# Patient Record
Sex: FEMALE | Race: WHITE | NOT HISPANIC OR LATINO | Employment: PART TIME | URBAN - METROPOLITAN AREA
[De-identification: names, ages, dates, MRNs, and addresses within clinical notes are randomized per-mention and may not be internally consistent; named-entity substitution may affect disease eponyms.]

---

## 2019-01-02 ASSESSMENT — MIFFLIN-ST. JEOR: SCORE: 1257

## 2019-01-03 ENCOUNTER — ANESTHESIA - HEALTHEAST (OUTPATIENT)
Dept: SURGERY | Facility: HOSPITAL | Age: 29
End: 2019-01-03

## 2019-01-04 ENCOUNTER — SURGERY - HEALTHEAST (OUTPATIENT)
Dept: SURGERY | Facility: HOSPITAL | Age: 29
End: 2019-01-04

## 2019-01-04 ASSESSMENT — MIFFLIN-ST. JEOR: SCORE: 1269.7

## 2020-04-20 ENCOUNTER — RECORDS - HEALTHEAST (OUTPATIENT)
Dept: ADMINISTRATIVE | Facility: OTHER | Age: 30
End: 2020-04-20

## 2020-04-22 ENCOUNTER — COMMUNICATION - HEALTHEAST (OUTPATIENT)
Dept: TELEHEALTH | Facility: CLINIC | Age: 30
End: 2020-04-22

## 2020-04-22 ENCOUNTER — HOSPITAL ENCOUNTER (OUTPATIENT)
Dept: CT IMAGING | Facility: CLINIC | Age: 30
Discharge: HOME | End: 2020-04-22
Attending: OBSTETRICS & GYNECOLOGY

## 2020-04-22 DIAGNOSIS — R10.2 PELVIC PAIN: ICD-10-CM

## 2021-05-17 ENCOUNTER — RECORDS - HEALTHEAST (OUTPATIENT)
Dept: ADMINISTRATIVE | Facility: OTHER | Age: 31
End: 2021-05-17

## 2021-05-17 ASSESSMENT — MIFFLIN-ST. JEOR: SCORE: 1218.44

## 2021-05-18 ENCOUNTER — ANESTHESIA - HEALTHEAST (OUTPATIENT)
Dept: SURGERY | Facility: CLINIC | Age: 31
End: 2021-05-18

## 2021-05-18 ENCOUNTER — SURGERY - HEALTHEAST (OUTPATIENT)
Dept: SURGERY | Facility: CLINIC | Age: 31
End: 2021-05-18
Payer: MEDICARE

## 2021-05-18 ENCOUNTER — COMMUNICATION - HEALTHEAST (OUTPATIENT)
Dept: SCHEDULING | Facility: CLINIC | Age: 31
End: 2021-05-18

## 2021-05-25 ENCOUNTER — AMBULATORY - HEALTHEAST (OUTPATIENT)
Dept: SURGERY | Facility: CLINIC | Age: 31
End: 2021-05-25

## 2021-05-25 ENCOUNTER — OFFICE VISIT - HEALTHEAST (OUTPATIENT)
Dept: GERIATRICS | Facility: CLINIC | Age: 31
End: 2021-05-25

## 2021-05-25 DIAGNOSIS — S82.852S TRIMALLEOLAR FRACTURE OF ANKLE, CLOSED, LEFT, SEQUELA: ICD-10-CM

## 2021-05-25 DIAGNOSIS — F32.A ANXIETY AND DEPRESSION: ICD-10-CM

## 2021-05-25 DIAGNOSIS — F41.9 ANXIETY AND DEPRESSION: ICD-10-CM

## 2021-05-25 DIAGNOSIS — F60.3 BORDERLINE PERSONALITY DISORDER (H): ICD-10-CM

## 2021-05-25 DIAGNOSIS — Z11.59 ENCOUNTER FOR SCREENING FOR OTHER VIRAL DISEASES: ICD-10-CM

## 2021-05-25 DIAGNOSIS — B37.2 CANDIDAL DERMATITIS: ICD-10-CM

## 2021-05-25 DIAGNOSIS — G80.9 CEREBRAL PALSY, UNSPECIFIED TYPE (H): ICD-10-CM

## 2021-05-25 ASSESSMENT — MIFFLIN-ST. JEOR: SCORE: 1259.27

## 2021-05-26 ENCOUNTER — RECORDS - HEALTHEAST (OUTPATIENT)
Dept: ADMINISTRATIVE | Facility: OTHER | Age: 31
End: 2021-05-26

## 2021-05-26 ENCOUNTER — RECORDS - HEALTHEAST (OUTPATIENT)
Dept: LAB | Facility: CLINIC | Age: 31
End: 2021-05-26

## 2021-05-26 ENCOUNTER — OFFICE VISIT - HEALTHEAST (OUTPATIENT)
Dept: GERIATRICS | Facility: CLINIC | Age: 31
End: 2021-05-26

## 2021-05-26 DIAGNOSIS — S82.852S TRIMALLEOLAR FRACTURE OF ANKLE, CLOSED, LEFT, SEQUELA: ICD-10-CM

## 2021-05-26 LAB — HCG UR QL: NEGATIVE

## 2021-05-26 ASSESSMENT — MIFFLIN-ST. JEOR
SCORE: 1211.64
SCORE: 1259.27

## 2021-05-27 ENCOUNTER — RECORDS - HEALTHEAST (OUTPATIENT)
Dept: LAB | Facility: CLINIC | Age: 31
End: 2021-05-27

## 2021-05-27 VITALS
BODY MASS INDEX: 23.78 KG/M2 | BODY MASS INDEX: 22.59 KG/M2 | HEIGHT: 62 IN | BODY MASS INDEX: 22.59 KG/M2 | WEIGHT: 123.5 LBS | HEIGHT: 62 IN | WEIGHT: 130 LBS

## 2021-05-27 LAB
ANION GAP SERPL CALCULATED.3IONS-SCNC: 11 MMOL/L (ref 5–18)
BUN SERPL-MCNC: 11 MG/DL (ref 8–22)
CALCIUM SERPL-MCNC: 9 MG/DL (ref 8.5–10.5)
CHLORIDE BLD-SCNC: 104 MMOL/L (ref 98–107)
CO2 SERPL-SCNC: 23 MMOL/L (ref 22–31)
CREAT SERPL-MCNC: 0.68 MG/DL (ref 0.6–1.1)
ERYTHROCYTE [DISTWIDTH] IN BLOOD BY AUTOMATED COUNT: 11.9 % (ref 11–14.5)
GFR SERPL CREATININE-BSD FRML MDRD: >60 ML/MIN/1.73M2
GLUCOSE BLD-MCNC: 113 MG/DL (ref 70–125)
HBA1C MFR BLD: 5.1 %
HCT VFR BLD AUTO: 38.2 % (ref 35–47)
HGB BLD-MCNC: 13 G/DL (ref 12–16)
MCH RBC QN AUTO: 30.2 PG (ref 27–34)
MCHC RBC AUTO-ENTMCNC: 34 G/DL (ref 32–36)
MCV RBC AUTO: 89 FL (ref 80–100)
PLATELET # BLD AUTO: 394 THOU/UL (ref 140–440)
PMV BLD AUTO: 10.3 FL (ref 8.5–12.5)
POTASSIUM BLD-SCNC: 3.4 MMOL/L (ref 3.5–5)
RBC # BLD AUTO: 4.31 MILL/UL (ref 3.8–5.4)
SARS-COV-2 PCR COMMENT: NORMAL
SARS-COV-2 RNA SPEC QL NAA+PROBE: NEGATIVE
SARS-COV-2 VIRUS SPECIMEN SOURCE: NORMAL
SODIUM SERPL-SCNC: 138 MMOL/L (ref 136–145)
WBC: 6.7 THOU/UL (ref 4–11)

## 2021-05-28 ENCOUNTER — RECORDS - HEALTHEAST (OUTPATIENT)
Dept: ADMINISTRATIVE | Facility: OTHER | Age: 31
End: 2021-05-28

## 2021-05-28 ENCOUNTER — ANESTHESIA - HEALTHEAST (OUTPATIENT)
Dept: SURGERY | Facility: CLINIC | Age: 31
End: 2021-05-28

## 2021-05-28 ENCOUNTER — SURGERY - HEALTHEAST (OUTPATIENT)
Dept: SURGERY | Facility: CLINIC | Age: 31
End: 2021-05-28
Payer: MEDICARE

## 2021-05-28 ASSESSMENT — MIFFLIN-ST. JEOR: SCORE: 1238.85

## 2021-06-01 ENCOUNTER — OFFICE VISIT - HEALTHEAST (OUTPATIENT)
Dept: GERIATRICS | Facility: CLINIC | Age: 31
End: 2021-06-01

## 2021-06-01 DIAGNOSIS — Z98.890 S/P ORIF (OPEN REDUCTION INTERNAL FIXATION) FRACTURE: ICD-10-CM

## 2021-06-01 DIAGNOSIS — F60.3 BORDERLINE PERSONALITY DISORDER (H): ICD-10-CM

## 2021-06-01 DIAGNOSIS — Z87.81 S/P ORIF (OPEN REDUCTION INTERNAL FIXATION) FRACTURE: ICD-10-CM

## 2021-06-01 DIAGNOSIS — F32.A DEPRESSION, UNSPECIFIED DEPRESSION TYPE: ICD-10-CM

## 2021-06-01 DIAGNOSIS — G80.9 CEREBRAL PALSY, UNSPECIFIED TYPE (H): ICD-10-CM

## 2021-06-01 DIAGNOSIS — S82.852S TRIMALLEOLAR FRACTURE OF ANKLE, CLOSED, LEFT, SEQUELA: ICD-10-CM

## 2021-06-01 ASSESSMENT — MIFFLIN-ST. JEOR: SCORE: 1275.6

## 2021-06-02 VITALS — WEIGHT: 134.8 LBS | HEIGHT: 62 IN | BODY MASS INDEX: 24.8 KG/M2

## 2021-06-08 ENCOUNTER — OFFICE VISIT - HEALTHEAST (OUTPATIENT)
Dept: GERIATRICS | Facility: CLINIC | Age: 31
End: 2021-06-08

## 2021-06-08 DIAGNOSIS — S82.852S TRIMALLEOLAR FRACTURE OF ANKLE, CLOSED, LEFT, SEQUELA: ICD-10-CM

## 2021-06-08 ASSESSMENT — MIFFLIN-ST. JEOR: SCORE: 1233.87

## 2021-06-11 ENCOUNTER — OFFICE VISIT - HEALTHEAST (OUTPATIENT)
Dept: GERIATRICS | Facility: CLINIC | Age: 31
End: 2021-06-11

## 2021-06-11 DIAGNOSIS — S82.852S TRIMALLEOLAR FRACTURE OF ANKLE, CLOSED, LEFT, SEQUELA: ICD-10-CM

## 2021-06-11 DIAGNOSIS — F32.A ANXIETY AND DEPRESSION: ICD-10-CM

## 2021-06-11 DIAGNOSIS — Z87.81 S/P ORIF (OPEN REDUCTION INTERNAL FIXATION) FRACTURE: ICD-10-CM

## 2021-06-11 DIAGNOSIS — F41.9 ANXIETY AND DEPRESSION: ICD-10-CM

## 2021-06-11 DIAGNOSIS — Z98.890 S/P ORIF (OPEN REDUCTION INTERNAL FIXATION) FRACTURE: ICD-10-CM

## 2021-06-11 RX ORDER — HYDROMORPHONE HYDROCHLORIDE 2 MG/1
2-4 TABLET ORAL EVERY 4 HOURS PRN
Status: SHIPPED | COMMUNITY
Start: 2021-06-11 | End: 2024-05-15

## 2021-06-11 RX ORDER — KETOROLAC TROMETHAMINE 10 MG/1
10 TABLET, FILM COATED ORAL EVERY 6 HOURS PRN
Status: SHIPPED | COMMUNITY
Start: 2021-06-11 | End: 2024-05-15

## 2021-06-11 ASSESSMENT — MIFFLIN-ST. JEOR: SCORE: 1233.87

## 2021-06-15 PROBLEM — R10.2 PELVIC PAIN: Status: ACTIVE | Noted: 2017-01-21

## 2021-06-16 PROBLEM — S82.852S TRIMALLEOLAR FRACTURE OF ANKLE, CLOSED, LEFT, SEQUELA: Status: ACTIVE | Noted: 2021-05-17

## 2021-06-16 PROBLEM — G80.9 CEREBRAL PALSY (H): Status: ACTIVE | Noted: 2017-08-27

## 2021-06-16 PROBLEM — F60.3 BORDERLINE PERSONALITY DISORDER (H): Status: ACTIVE | Noted: 2017-08-27

## 2021-06-16 PROBLEM — F33.1 MODERATE EPISODE OF RECURRENT MAJOR DEPRESSIVE DISORDER (H): Status: ACTIVE | Noted: 2017-07-06

## 2021-06-16 PROBLEM — F32.A DEPRESSION: Status: ACTIVE | Noted: 2017-08-27

## 2021-06-17 NOTE — ANESTHESIA PROCEDURE NOTES
Peripheral Block    Patient location during procedure: OR  Start time: 5/18/2021 1:30 PM  End time: 5/18/2021 1:33 PM  post-op analgesia per surgeon order as noted in medical record  Staffing:  Performing  Anesthesiologist: Jose Klein IV, MD  Preanesthetic Checklist  Completed: patient identified, site marked, risks, benefits, and alternatives discussed, timeout performed, consent obtained, at patient's request, airway assessed, oxygen available, suction available, emergency drugs available and hand hygiene performed  Peripheral Block  Block type: saphenous, adductor canal block  Prep: ChloraPrep  Patient position: supine  Patient monitoring: cardiac monitor, continuous pulse oximetry, heart rate and blood pressure  Laterality: left  Injection technique: ultrasound guided    Ultrasound used to visualize needle placement in proximity to nerve being blocked: yes   US used to visualize anesthetic spread  Visualized anatomic structures normal  No Pathological Findings  Permanent ultrasound image captured for medical record  Sterile gel and probe cover used for ultrasound.  Needle  Needle type: echogenic   Needle gauge: 20G  Needle length: 6 in  no peripheral nerve catheter placed  Assessment  Injection assessment: no difficulty with injection, negative aspiration for heme, incremental injection and no paresthesia on injection

## 2021-06-17 NOTE — ANESTHESIA PREPROCEDURE EVALUATION
Anesthesia Evaluation      Patient summary reviewed   No history of anesthetic complications     Airway   Mallampati: I  Neck ROM: full   Pulmonary - negative ROS and normal exam   (+) a smoker (current some day)                         Cardiovascular - negative ROS and normal exam  Exercise tolerance: > or = 4 METS  (-) hypertension, past MI, CAD, murmur  Rhythm: regular  Rate: normal,    no murmur      Neuro/Psych    (+) depression, anxiety/panic attacks,     Comments: Cerebral palsy  Borderline developmental delay  ADD  Borderline personality disorder      Endo/Other - negative ROS      GI/Hepatic/Renal - negative ROS      Other findings: Labs 12/28/18:  WBC 8.8, Hgb 14.6, Plt 270  Na 140, K 3.7, BUN 16, Cr 0.81      Dental - normal exam                          Anesthesia Plan  Planned anesthetic: general endotracheal    Hx of PONV and plan for scopolamine patch, dexamethasone, zofran and low-dose propofol gtt (25-50 mcg/kg/min).    ASA 2   Induction: intravenous   Anesthetic plan and risks discussed with: patient and parent/guardian  Anesthesia plan special considerations: antiemetics,   Post-op plan: routine recovery

## 2021-06-17 NOTE — ANESTHESIA CARE TRANSFER NOTE
Last vitals:   Vitals:    05/18/21 1425   BP: 114/73   Pulse: 69   Resp: 15   Temp: 36.2  C (97.2  F)   SpO2: 100%     Patient's level of consciousness is drowsy  Spontaneous respirations: yes  Maintains airway independently: yes  Dentition unchanged: yes  Oropharynx: oropharynx clear of all foreign objects    QCDR Measures:  ASA# 20 - Surgical Safety Checklist: WHO surgical safety checklist completed prior to induction    PQRS# 430 - Adult PONV Prevention: 4558F - Pt received => 2 anti-emetic agents (different classes) preop & intraop  ASA# 8 - Peds PONV Prevention: NA - Not pediatric patient, not GA or 2 or more risk factors NOT present  PQRS# 424 - Deborah-op Temp Management: 4559F - At least one body temp DOCUMENTED => 35.5C or 95.9F within required timeframe  PQRS# 426 - PACU Transfer Protocol: - Transfer of care checklist used  ASA# 14 - Acute Post-op Pain: ASA14B - Patient did NOT experience pain >= 7 out of 10

## 2021-06-17 NOTE — ANESTHESIA PROCEDURE NOTES
Peripheral Block    Patient location during procedure: OR  Start time: 5/18/2021 1:25 PM  End time: 5/18/2021 1:30 PM  post-op analgesia per surgeon order as noted in medical record  Staffing:  Performing  Anesthesiologist: Jose Klein IV, MD  Preanesthetic Checklist  Completed: patient identified, site marked, risks, benefits, and alternatives discussed, timeout performed, consent obtained, at patient's request, airway assessed, oxygen available, suction available, emergency drugs available and hand hygiene performed  Peripheral Block  Block type: sciatic, popliteal  Prep: ChloraPrep  Patient position: supine  Patient monitoring: cardiac monitor, continuous pulse oximetry, heart rate and blood pressure  Laterality: left  Injection technique: ultrasound guided    Ultrasound used to visualize needle placement in proximity to nerve being blocked: yes   US used to visualize anesthetic spread  Visualized anatomic structures normal  No Pathological Findings  Permanent ultrasound image captured for medical record    Needle  Needle gauge: 20G  Needle length: 6 in  no peripheral nerve catheter placed  Assessment  Injection assessment: no difficulty with injection, negative aspiration for heme, no paresthesia on injection and incremental injection

## 2021-06-17 NOTE — ANESTHESIA POSTPROCEDURE EVALUATION
Patient: Shannon Christopher  Procedure(s):  CLOSED REDUCTION EXTERNAL FIXATION, LEFT ANKLE FRACTURE (Left)  Anesthesia type: general    Patient location: PACU  Last vitals:   Vitals Value Taken Time   /71 05/18/21 1630   Temp 36.7  C (98  F) 05/18/21 1630   Pulse 79 05/18/21 1630   Resp 16 05/18/21 1630   SpO2 97 % 05/18/21 1630     Post vital signs: stable  Level of consciousness: awake and responds to simple questions  Post-anesthesia pain: pain controlled  Post-anesthesia nausea and vomiting: no  Pulmonary: unassisted, return to baseline  Cardiovascular: stable and blood pressure at baseline  Hydration: adequate  Anesthetic events: no    QCDR Measures:  ASA# 11 - Deborah-op Cardiac Arrest: ASA11B - Patient did NOT experience unanticipated cardiac arrest  ASA# 12 - Deborah-op Mortality Rate: ASA12B - Patient did NOT die  ASA# 13 - PACU Re-Intubation Rate: ASA13B - Patient did NOT require a new airway mgmt  ASA# 10 - Composite Anes Safety: ASA10A - No serious adverse event    Additional Notes:

## 2021-06-22 NOTE — ANESTHESIA PREPROCEDURE EVALUATION
Anesthesia Evaluation      Patient summary reviewed   History of anesthetic complications (PONV)     Airway   Mallampati: I  Neck ROM: full   Pulmonary - normal exam   (+) a smoker (current some day)                         Cardiovascular - normal exam  Exercise tolerance: > or = 4 METS  (-) hypertension, past MI, CAD, murmur  Rhythm: regular  Rate: normal,    no murmur      Neuro/Psych    (+) depression, anxiety/panic attacks,     Comments: Cerebral palsy  Borderline developmental delay  ADD  Borderline personality disorder      Endo/Other - negative ROS      GI/Hepatic/Renal - negative ROS      Other findings: Labs 12/28/18:  WBC 8.8, Hgb 14.6, Plt 270  Na 140, K 3.7, BUN 16, Cr 0.81      Dental - normal exam                        Anesthesia Plan  Planned anesthetic: general endotracheal  GETA.  Hx of PONV and plan for scopolamine patch, dexamethasone, zofran and low-dose propofol gtt (25-50 mcg/kg/min).    ASA 2   Induction: intravenous   Anesthetic plan and risks discussed with: patient and parent/guardian  Anesthesia plan special considerations: antiemetics,   Post-op plan: routine recovery

## 2021-06-22 NOTE — ANESTHESIA POSTPROCEDURE EVALUATION
Patient: Shannon Christopher  ROBOTIC TOTAL LAPAROSCOPIC HYSTERECTOMY, BILATERAL SALPINGECTOMY LYSIS OF ADHESIONS  Anesthesia type: general    Patient location: PACU  Last vitals:   Vitals:    01/04/19 0950   BP: 112/69   Pulse: 68   Resp: 28   Temp:    SpO2: 98%     Post vital signs: stable  Level of consciousness: awake and responds to simple questions  Post-anesthesia pain: pain controlled  Post-anesthesia nausea and vomiting: no  Pulmonary: unassisted, return to baseline  Cardiovascular: stable and blood pressure at baseline  Hydration: adequate  Anesthetic events: no    QCDR Measures:  ASA# 11 - Deborah-op Cardiac Arrest: ASA11B - Patient did NOT experience unanticipated cardiac arrest  ASA# 12 - Deborah-op Mortality Rate: ASA12B - Patient did NOT die  ASA# 13 - PACU Re-Intubation Rate: ASA13B - Patient did NOT require a new airway mgmt  ASA# 10 - Composite Anes Safety: ASA10A - No serious adverse event    Additional Notes:

## 2021-06-22 NOTE — ANESTHESIA CARE TRANSFER NOTE
Last vitals:   Vitals:    01/04/19 0900   BP: (!) 125/97   Pulse: 77   Resp: 14   Temp: 37.2  C (99  F)   SpO2: 99%     Patient's level of consciousness is drowsy  Spontaneous respirations: yes  Maintains airway independently: yes  Dentition unchanged: yes  Oropharynx: oropharynx clear of all foreign objects    QCDR Measures:  ASA# 20 - Surgical Safety Checklist: WHO surgical safety checklist completed prior to induction    PQRS# 430 - Adult PONV Prevention: 4558F - Pt received => 2 anti-emetic agents (different classes) preop & intraop  ASA# 8 - Peds PONV Prevention: NA - Not pediatric patient, not GA or 2 or more risk factors NOT present  PQRS# 424 - Deborah-op Temp Management: 4559F - At least one body temp DOCUMENTED => 35.5C or 95.9F within required timeframe  PQRS# 426 - PACU Transfer Protocol: - Transfer of care checklist used  ASA# 14 - Acute Post-op Pain: ASA14B - Patient did NOT experience pain >= 7 out of 10

## 2021-06-25 NOTE — PROGRESS NOTES
Code Status:  FULL CODE  Visit Type: Hospital Visit Follow Up (Trimalleolar fracture, status post external fixation, pain management, rash)     Facility:  [facility] [[id number]]      Facility Type: SNF (Skilled Nursing Facility, TCU)    History of Present Illness:   Hospital Admission Date: 5/17/2021 Hospital Discharge Date: 5/20/2021      Shannon Christopher is a 31 y.o. female with a past medical history for cerebral palsy, intellectual impairment, borderline personality, depression with anxiety.  Recently hospitalized after a mechanical fall in which she sustained a trimalleolar fracture.  She underwent external fixation and open reduction with internal fixation and splinting.  She was to remain nonweightbearing of her left leg and leave her splint in place.  She has a follow-up with orthopedics on 5/25/2020.    Today, she reports that she is having extreme pain and feels that 5 mg of oxycodone does not always manage her pain.  Chart review reveals she was taking oxycodone 5-10 mg every 4 hours as needed in the hospital.  She has good CMS to her toes and is able to move those easily with minimal edema.  Her APAP is only as needed at this time.  She does complain of an itchy raised red rash of her groin area.  Does not have any discharge.    Past Medical History:   Diagnosis Date     ADD (attention deficit disorder)      Anxiety      Borderline mental retardation      Borderline personality disorder (H)      Cerebral palsy (H)      Depression      PONV (postoperative nausea and vomiting)      Uterine fibroid      Past Surgical History:   Procedure Laterality Date     ADENOIDECTOMY       BUNIONECTOMY       EXTERNAL FIXATOR APPLICATION Left 5/18/2021    Procedure: CLOSED REDUCTION EXTERNAL FIXATION, LEFT ANKLE FRACTURE;  Surgeon: Dereck Nielson DO;  Location: [facility];  Service: Orthopedics     EYE MUSCLE SURGERY       FOOT CAPSULE RELEASE W/ PERCUTANEOUS HEEL CORD LENGTHENING,  TIBIAL TENDON TRANSFER Right      LAPAROSCOPIC HYSTERECTOMY N/A 1/4/2019    Procedure: ROBOTIC TOTAL LAPAROSCOPIC HYSTERECTOMY, BILATERAL SALPINGECTOMY LYSIS OF ADHESIONS;  Surgeon: Jose Golden MD;  Location: [facility];  Service: Gynecology     TYMPANOPLASTY       No family history on file.  Social History     Socioeconomic History     Marital status: Single     Spouse name: Not on file     Number of children: Not on file     Years of education: Not on file     Highest education level: Not on file   Occupational History     Not on file   Social Needs     Financial resource strain: Not on file     Food insecurity     Worry: Not on file     Inability: Not on file     Transportation needs     Medical: Not on file     Non-medical: Not on file   Tobacco Use     Smoking status: Current Some Day Smoker     Smokeless tobacco: Never Used     Tobacco comment: < 1 PPW   Substance and Sexual Activity     Alcohol use: Yes     Comment: rarely     Drug use: No     Sexual activity: Not on file   Lifestyle     Physical activity     Days per week: Not on file     Minutes per session: Not on file     Stress: Not on file   Relationships     Social connections     Talks on phone: Not on file     Gets together: Not on file     Attends Advent service: Not on file     Active member of club or organization: Not on file     Attends meetings of clubs or organizations: Not on file     Relationship status: Not on file     Intimate partner violence     Fear of current or ex partner: Not on file     Emotionally abused: Not on file     Physically abused: Not on file     Forced sexual activity: Not on file   Other Topics Concern     Not on file   Social History Narrative    Patient arrived alone to the ED 08/13/17       Current Outpatient Medications   Medication Sig Dispense Refill     acetaminophen (TYLENOL) 325 MG tablet Take 2 tablets (650 mg total) by mouth every 4 (four) hours as needed for pain (mild pain). 100 tablet 0      aspirin 325 MG tablet Take 1 tablet (325 mg total) by mouth daily. 30 tablet 0     divalproex (DEPAKOTE ER) 500 MG 24 hour tablet Take 500 mg by mouth at bedtime.        estradioL (ESTRACE) 2 MG tablet Take 2 mg by mouth at bedtime.        hydrOXYzine HCL (ATARAX) 25 MG tablet Take 1 tablet (25 mg total) by mouth every 6 (six) hours as needed for itching (with pain, moderate pain). 30 tablet 0     LATUDA 60 mg Tab tablet Take 60 mg by mouth at bedtime.        OLANZapine (ZYPREXA) 7.5 MG tablet Take 7.5 mg by mouth at bedtime.        oxyCODONE (ROXICODONE) 5 MG immediate release tablet Take 1 tablet (5 mg total) by mouth every 4 (four) hours as needed for pain (moderate to severe pain). (Patient taking differently: Take 5-7.5 mg by mouth every 4 (four) hours as needed for pain (moderate to severe pain). ) 30 tablet 0     polyethylene glycol (MIRALAX) 17 gram packet Take 1 packet (17 g total) by mouth daily. 7 packet 0     senna-docusate (PERICOLACE) 8.6-50 mg tablet Take 1-2 tablets by mouth 2 (two) times a day. Take while on oral narcotics to prevent or treat constipation. 30 tablet 0     zonisamide (ZONEGRAN) 100 MG capsule Take 400 mg by mouth at bedtime.        No current facility-administered medications for this visit.      No Known Allergies    There is no immunization history on file for this patient.    Post Discharge Medication Reconciliation Status: discharge medications reconciled and changed, per note/orders    Review of Systems   Patient denies fever, chills, headache, lightheadedness, dizziness, rhinorrhea, cough, congestion, shortness of breath, chest pain, palpitations, abdominal pain, n/v, diarrhea, constipation, change in appetite, dysuria, frequency, burning or pain with urination.  Other than stated in HPI all other review of systems is negative.         Physical Exam   Vitals:    05/25/21 1039   BP: 127/88   Pulse: 86   Resp: 20   Temp: 99.1  F (37.3  C)   SpO2: 99%       GENERAL APPEARANCE:  Well developed, well nourished, in no acute distress.  HEENT: normocephalic, atraumatic  PERRL, sclerae anicteric, conjunctivae clear and moist, EOM intact  LUNGS: Lung sounds CTA, no adventitious sounds, respiratory effort normal.  CARD: RRR, S1, S2, without murmurs, gallops, rubs,  ABD: Soft, none distended and nontender with normal bowel sounds.   MSK: Muscle strength and tone were equal bilaterally  EXTREMITIES: Left foot with good mobility of toes, good CMS and minimal edema.  Left foot in splint with external fixation device and toes only visible  NEURO: Alert and oriented x 3.  Intellectual impairment  Face is symmetric.  SKIN: Red raised candidal rash of bilateral groins  PSYCH: euthymic        Labs:  Recent Results (from the past 240 hour(s))   Drugs of Abuse 1,Urine   Result Value Ref Range    Amphetamines (!) Screen Negative     Screen Positive (Confirmation available on request)    Benzodiazepines Screen Negative Screen Negative    Opiates Screen Negative Screen Negative    Phencyclidine Screen Negative Screen Negative    THC Screen Negative Screen Negative    Barbiturates Screen Negative Screen Negative    Cocaine Metabolite Screen Negative Screen Negative    Oxycodone (!) Screen Negative     Screen Positive (Confirmation available on request)    Creatinine, Urine 120.2 mg/dL   HM2(CBC w/o Differential)   Result Value Ref Range    WBC 7.6 4.0 - 11.0 thou/uL    RBC 4.40 3.80 - 5.40 mill/uL    Hemoglobin 13.2 12.0 - 16.0 g/dL    Hematocrit 38.9 35.0 - 47.0 %    MCV 88 80 - 100 fL    MCH 30.0 27.0 - 34.0 pg    MCHC 33.9 32.0 - 36.0 g/dL    RDW 12.0 11.0 - 14.5 %    Platelets 260 140 - 440 thou/uL    MPV 10.6 8.5 - 12.5 fL   Comprehensive Metabolic Panel   Result Value Ref Range    Sodium 141 136 - 145 mmol/L    Potassium 3.3 (L) 3.5 - 5.0 mmol/L    Chloride 106 98 - 107 mmol/L    CO2 21 (L) 22 - 31 mmol/L    Anion Gap, Calculation 14 5 - 18 mmol/L    Glucose 90 70 - 125 mg/dL    BUN 17 8 - 22 mg/dL     Creatinine 0.77 0.60 - 1.10 mg/dL    GFR MDRD Af Amer >60 >60 mL/min/1.73m2    GFR MDRD Non Af Amer >60 >60 mL/min/1.73m2    Bilirubin, Total 0.7 0.0 - 1.0 mg/dL    Calcium 8.9 8.5 - 10.5 mg/dL    Protein, Total 6.9 6.0 - 8.0 g/dL    Albumin 3.9 3.5 - 5.0 g/dL    Alkaline Phosphatase 58 45 - 120 U/L     (H) 0 - 40 U/L    ALT 68 (H) 0 - 45 U/L   Beta-hCG, Qualitative, Serum   Result Value Ref Range    Beta hCG Qualitative Negative Negative   Asymptomatic SARS-CoV-2 (COVID-19)-PCR    Specimen: Respiratory   Result Value Ref Range    SARS-CoV-2 PCR Result Negative Negative, Invalid   Potassium   Result Value Ref Range    Potassium 3.7 3.5 - 5.0 mmol/L   Potassium - Next AM   Result Value Ref Range    Potassium 4.1 3.5 - 5.0 mmol/L   Hemoglobin POD1 and POD2   Result Value Ref Range    Hemoglobin 11.1 (L) 12.0 - 16.0 g/dL   Hemoglobin POD1 and POD2   Result Value Ref Range    Hemoglobin 10.5 (L) 12.0 - 16.0 g/dL   Potassium - Next AM   Result Value Ref Range    Potassium 3.7 3.5 - 5.0 mmol/L         Assessment:  1. Trimalleolar fracture of ankle, closed, left, sequela     2. Candidal dermatitis     3. Anxiety and depression     4. Cerebral palsy, unspecified type (H)     5. Borderline personality disorder (H)         Plan:   Tri malleolar fracture status post external fixation.  Follow-up surgery planned for 5/28.  She will be seeing orthopedics today.  Pain management is poor at this time so we will increase her oxycodone to 5-7.5 mg every 4 hours and could consider increasing that if needed.  Will schedule Tylenol 1000 mg 3 times daily and counseled patient on this change in the rationale to decrease her pain threshold.  Does have Atarax to potentiate her oxycodone however nursing does not always have good understanding of this plan will monitor and consider scheduling if needed.    Bowels managed with daily MiraLAX and senna S.    Candidal dermatitis: Nystatin cream 3 times daily until rash resolved      depression with anxiety: Appearing stable at this time, on Depakote, Latuda, Zyprexa and zonisamide.    cerebral palsy: Compensated, continue to monitor    Borderline personality disorder: Also has developmental disabilities that can play into anxiety.  Will need to monitor and communicate closely with the patient.    35 total minutes spent with 20 minutes spent in counseling and reassuring patient on pain management, medication regimen and plan for dermatitis.     Electronically signed by: Shasha Ocampo CNP

## 2021-06-25 NOTE — PROGRESS NOTES
Code Status:  FULL CODE  Visit Type: Follow-up (Trimalleolar fracture, s/p ORIF)     Facility:  [facility] [[id number]]      Facility Type: SNF (Skilled Nursing Facility, TCU)    History of Present Illness:   Hospital Admission Date: 5/17/2021 Hospital Discharge Date: 5/20/2021      Shannon Christopher is a 31 y.o. female with a past medical history for cerebral palsy, intellectual impairment, borderline personality, depression with anxiety.  Recently hospitalized after a mechanical fall in which she sustained a trimalleolar fracture.  She underwent external fixation and open reduction with internal fixation and splinting.  She was to remain nonweightbearing of her left leg and leave her splint in place.  She has a follow-up with orthopedics on 5/25/2020.    Today, I see the patient in follow up after having a ORIF of trimalleolar fracture on 5/28.  She denies any pain and reports trying to minimize the amount of pain meds she is taking.  She was switched to Ketoralac and dilaudid during her surgery.  She continues on APAP scheduled.  She is to work on walking with a walker with [facility] on LLE today.  She is requesting to be discharged soon.      Past Medical History:   Diagnosis Date     ADD (attention deficit disorder)      Anxiety      Borderline mental retardation      Borderline personality disorder (H)      Cerebral palsy (H)      Depression      PONV (postoperative nausea and vomiting)      Uterine fibroid      Past Surgical History:   Procedure Laterality Date     ADENOIDECTOMY       BUNIONECTOMY       EXTERNAL FIXATOR APPLICATION Left 5/18/2021    Procedure: CLOSED REDUCTION EXTERNAL FIXATION, LEFT ANKLE FRACTURE;  Surgeon: Dereck Nielson DO;  Location: [facility];  Service: Orthopedics     EYE MUSCLE SURGERY       FOOT CAPSULE RELEASE W/ PERCUTANEOUS HEEL CORD LENGTHENING, TIBIAL TENDON TRANSFER Right      LAPAROSCOPIC HYSTERECTOMY N/A 1/4/2019    Procedure: ROBOTIC TOTAL  LAPAROSCOPIC HYSTERECTOMY, BILATERAL SALPINGECTOMY LYSIS OF ADHESIONS;  Surgeon: Jose Golden MD;  Location: [facility];  Service: Gynecology     TYMPANOPLASTY       No family history on file.  Social History     Socioeconomic History     Marital status: Single     Spouse name: Not on file     Number of children: Not on file     Years of education: Not on file     Highest education level: Not on file   Occupational History     Not on file   Social Needs     Financial resource strain: Not on file     Food insecurity     Worry: Not on file     Inability: Not on file     Transportation needs     Medical: Not on file     Non-medical: Not on file   Tobacco Use     Smoking status: Current Some Day Smoker     Smokeless tobacco: Never Used     Tobacco comment: < 1 PPW   Substance and Sexual Activity     Alcohol use: Yes     Comment: rarely     Drug use: No     Sexual activity: Not on file   Lifestyle     Physical activity     Days per week: Not on file     Minutes per session: Not on file     Stress: Not on file   Relationships     Social connections     Talks on phone: Not on file     Gets together: Not on file     Attends Christian service: Not on file     Active member of club or organization: Not on file     Attends meetings of clubs or organizations: Not on file     Relationship status: Not on file     Intimate partner violence     Fear of current or ex partner: Not on file     Emotionally abused: Not on file     Physically abused: Not on file     Forced sexual activity: Not on file   Other Topics Concern     Not on file   Social History Narrative    Patient arrived alone to the ED 08/13/17       Current Outpatient Medications   Medication Sig Dispense Refill     acetaminophen (TYLENOL) 500 MG tablet Take 1,000 mg by mouth 3 (three) times a day.       aspirin 81 MG EC tablet Take 1 tablet (81 mg total) by mouth 2 (two) times a day.  0     divalproex (DEPAKOTE ER) 500 MG 24 hour tablet Take 500 mg by mouth  at bedtime.        estradioL (ESTRACE) 2 MG tablet Take 2 mg by mouth at bedtime.        HYDROmorphone (DILAUDID) 2 MG tablet Take 1-2 tablets (2-4 mg total) by mouth every 4 (four) hours as needed for pain. 30 tablet 0     hydrOXYzine HCL (ATARAX) 25 MG tablet Take 1 tablet (25 mg total) by mouth every 6 (six) hours as needed for itching (with pain, moderate pain). 30 tablet 0     ketorolac (TORADOL) 10 mg tablet Take 1 tablet (10 mg total) by mouth every 6 (six) hours as needed for pain. 20 tablet 0     LATUDA 60 mg Tab tablet Take 60 mg by mouth at bedtime.        nystatin (MYCOSTATIN) cream Apply topically 3 (three) times a day as needed for dry skin.       OLANZapine (ZYPREXA) 7.5 MG tablet Take 7.5 mg by mouth at bedtime.        polyethylene glycol (MIRALAX) 17 gram packet Take 1 packet (17 g total) by mouth daily. 7 packet 0     senna-docusate (PERICOLACE) 8.6-50 mg tablet Take 1-2 tablets by mouth 2 (two) times a day. Take while on oral narcotics to prevent or treat constipation. 30 tablet 0     zonisamide (ZONEGRAN) 100 MG capsule Take 400 mg by mouth at bedtime.        No current facility-administered medications for this visit.      No Known Allergies  Immunization History   Administered Date(s) Administered     COVID-19,PF,Moderna 03/03/2021, 03/31/2021       Post Discharge Medication Reconciliation Status: discharge medications reconciled and changed, per note/orders    Review of Systems   Patient denies fever, chills, headache, lightheadedness, dizziness, rhinorrhea, cough, congestion, shortness of breath, chest pain, palpitations, abdominal pain, n/v, diarrhea, constipation, change in appetite, dysuria, frequency, burning or pain with urination.  Other than stated in HPI all other review of systems is negative.         Physical Exam   Vitals:    06/01/21 0926   BP: 132/84   Pulse: 86   Resp: 20   Temp: 99.4  F (37.4  C)   SpO2: 100%       GENERAL APPEARANCE: Well developed, well nourished, in no acute  distress.  HEENT: normocephalic, atraumatic  PERRL, sclerae anicteric, conjunctivae clear and moist, EOM intact  LUNGS: Lung sounds CTA, no adventitious sounds, respiratory effort normal.  CARD: RRR, S1, S2, without murmurs, gallops, rubs,  ABD: Soft, none distended and nontender with normal bowel sounds.   MSK: Muscle strength and tone were equal bilaterally  EXTREMITIES: Left foot with good mobility of toes, good CMS and minimal edema. LLE in hard cast. No calf tenderness  NEURO: Alert and oriented x 3.  Intellectual impairment  Face is symmetric.  SKIN: no new rashes, petechiae   PSYCH: euthymic        Labs:  Recent Results (from the past 240 hour(s))   COVID-19 Virus PCR MRF    Specimen: Respiratory   Result Value Ref Range    COVID-19 VIRUS SPECIMEN SOURCE Nasopharyngeal     2019-nCOV       Test received-See reflex to IDDL test SARS CoV2 (COVID-19) Virus RT-PCR   SARS-CoV-2 (COVID-19)-PCR    Specimen: Respiratory   Result Value Ref Range    SARS-CoV-2 Virus Specimen Source Nasopharyngeal     SARS-CoV-2 PCR Result NEGATIVE     SARS-COV-2 PCR COMMENT       Testing was performed using the Aptima SARS-CoV-2 Assay on the [place]   Pregnancy, Urine   Result Value Ref Range    Pregnancy Test, Urine Negative Negative   Basic Metabolic Panel   Result Value Ref Range    Sodium 138 136 - 145 mmol/L    Potassium 3.4 (L) 3.5 - 5.0 mmol/L    Chloride 104 98 - 107 mmol/L    CO2 23 22 - 31 mmol/L    Anion Gap, Calculation 11 5 - 18 mmol/L    Glucose 113 70 - 125 mg/dL    Calcium 9.0 8.5 - 10.5 mg/dL    BUN 11 8 - 22 mg/dL    Creatinine 0.68 0.60 - 1.10 mg/dL    GFR MDRD Af Amer >60 >60 mL/min/1.73m2    GFR MDRD Non Af Amer >60 >60 mL/min/1.73m2   Glycosylated Hemoglobin A1C   Result Value Ref Range    Hemoglobin A1c 5.1 <=5.6 %   HM2(CBC w/o Differential)   Result Value Ref Range    WBC 6.7 4.0 - 11.0 thou/uL    RBC 4.31 3.80 - 5.40 mill/uL    Hemoglobin 13.0 12.0 - 16.0 g/dL    Hematocrit 38.2 35.0 - 47.0 %    MCV 89 80 - 100  fL    MCH 30.2 27.0 - 34.0 pg    MCHC 34.0 32.0 - 36.0 g/dL    RDW 11.9 11.0 - 14.5 %    Platelets 394 140 - 440 thou/uL    MPV 10.3 8.5 - 12.5 fL   Hemoglobin POD1 and POD2   Result Value Ref Range    Hemoglobin 11.6 (L) 12.0 - 16.0 g/dL   Urinalysis-UC if Indicated   Result Value Ref Range    Color, UA Colorless Light Yellow, Yellow    Clarity, UA Clear Clear    Glucose, UA Negative Negative    Protein, UA Negative Negative    Bilirubin, UA Negative Negative    Urobilinogen, UA <2.0 mg/dL <2.0 mg/dL    pH, UA 7.5 5.0 - 8.0    Blood, UA Negative Negative    Ketones, UA Negative Negative    Nitrite, UA Negative Negative    Leukocytes, UA Negative Negative    Specific Gravity, UA 1.009 1.001 - 1.030         Assessment:  1. Trimalleolar fracture of ankle, closed, left, sequela     2. Depression, unspecified depression type     3. Cerebral palsy, unspecified type (H)     4. Borderline personality disorder (H)     5. S/P ORIF (open reduction internal fixation) fracture         Plan:   Tri malleolar fracture status post ORIF fixation.  Follow-up surgery planned for 5/28.  Using minimal dilaudid, atarax and ketoralac.  Did use dilaudid 3 x yesterday.  Will need wheelchair for mobility at discharge.     Depression with anxiety: feeling anxious about being in the facility and is requesting discharge.  I did update SW and therapy to assess for plan of care.  Continue on home depakote, latuda, zyprexa and zonisamide.     Bowels managed with daily MiraLAX and senna S.    Candidal dermatitis: Nystatin cream 3 times daily until rash resolved    cerebral palsy: Compensated, continue to monitor    Borderline personality disorder: Also has developmental disabilities that can play into anxiety.  Will need to monitor and communicate closely with the patient.        Electronically signed by: Shasha Ocampo CNP

## 2021-06-25 NOTE — PROGRESS NOTES
[facility]    Facility:   [facility] [[id number]]   Code Status: FULL CODE  PCP: Sveta Sandoval MD   Phone: [phone]   Fax: [phone]      Assessment/Plan:     Shannon Christopher is a 31 y.o. female with a past medical history for cerebral palsy, intellectual impairment, borderline personality, depression with anxiety.  Recently hospitalized after a mechanical fall in which she sustained a trimalleolar fracture.  She underwent external fixation and open reduction with internal fixation and splinting.  She was to remain nonweightbearing of her left leg and leave her splint in place.      Visit for Preoperative Exam.     Patient approved for surgery with general or local anesthesia.     Cardiac Risk: Revised Cardiac risk index; Low risk    Respiratory Risk: ARISCAT;  Low risk    Labs ordered as requested and will be faxed to surgeon's office.      ASA held starting 5/27    Subjective:     Scheduled Procedure: ORIF  Surgery Date:  5/28/2021        Current Outpatient Medications   Medication Sig Dispense Refill     acetaminophen (TYLENOL) 325 MG tablet Take 2 tablets (650 mg total) by mouth every 4 (four) hours as needed for pain (mild pain). 100 tablet 0     aspirin 325 MG tablet Take 1 tablet (325 mg total) by mouth daily. 30 tablet 0     divalproex (DEPAKOTE ER) 500 MG 24 hour tablet Take 500 mg by mouth at bedtime.        estradioL (ESTRACE) 2 MG tablet Take 2 mg by mouth at bedtime.        hydrOXYzine HCL (ATARAX) 25 MG tablet Take 1 tablet (25 mg total) by mouth every 6 (six) hours as needed for itching (with pain, moderate pain). 30 tablet 0     LATUDA 60 mg Tab tablet Take 60 mg by mouth at bedtime.        OLANZapine (ZYPREXA) 7.5 MG tablet Take 7.5 mg by mouth at bedtime.        oxyCODONE (ROXICODONE) 5 MG immediate release tablet Take 1 tablet (5 mg total) by mouth every 4 (four) hours as needed for pain (moderate to severe pain). (Patient taking differently:  Take 5-7.5 mg by mouth every 4 (four) hours as needed for pain (moderate to severe pain). ) 30 tablet 0     polyethylene glycol (MIRALAX) 17 gram packet Take 1 packet (17 g total) by mouth daily. 7 packet 0     senna-docusate (PERICOLACE) 8.6-50 mg tablet Take 1-2 tablets by mouth 2 (two) times a day. Take while on oral narcotics to prevent or treat constipation. 30 tablet 0     zonisamide (ZONEGRAN) 100 MG capsule Take 400 mg by mouth at bedtime.        No current facility-administered medications for this visit.        No Known Allergies    Immunization History   Administered Date(s) Administered     COVID-19,PF,Moderna 03/03/2021, 03/31/2021       Patient Active Problem List   Diagnosis     Pelvic pain     Plantar fascial fibromatosis     Moderate episode of recurrent major depressive disorder (H)     Ovulation pain     Lumbar strain     Depression     Cerebral palsy (H)     Borderline personality disorder (H)     Borderline intellectual disability     Anxiety     Trimalleolar fracture of ankle, closed, left, sequela     Anxiety and depression       Past Medical History:   Diagnosis Date     ADD (attention deficit disorder)      Anxiety      Borderline mental retardation      Borderline personality disorder (H)      Cerebral palsy (H)      Depression      PONV (postoperative nausea and vomiting)      Uterine fibroid        Social History     Socioeconomic History     Marital status: Single     Spouse name: Not on file     Number of children: Not on file     Years of education: Not on file     Highest education level: Not on file   Occupational History     Not on file   Social Needs     Financial resource strain: Not on file     Food insecurity     Worry: Not on file     Inability: Not on file     Transportation needs     Medical: Not on file     Non-medical: Not on file   Tobacco Use     Smoking status: Current Some Day Smoker     Smokeless tobacco: Never Used     Tobacco comment: < 1 PPW   Substance and Sexual  Activity     Alcohol use: Yes     Comment: rarely     Drug use: No     Sexual activity: Not on file   Lifestyle     Physical activity     Days per week: Not on file     Minutes per session: Not on file     Stress: Not on file   Relationships     Social connections     Talks on phone: Not on file     Gets together: Not on file     Attends Rastafari service: Not on file     Active member of club or organization: Not on file     Attends meetings of clubs or organizations: Not on file     Relationship status: Not on file     Intimate partner violence     Fear of current or ex partner: Not on file     Emotionally abused: Not on file     Physically abused: Not on file     Forced sexual activity: Not on file   Other Topics Concern     Not on file   Social History Narrative    Patient arrived alone to the ED 08/13/17       Past Surgical History:   Procedure Laterality Date     ADENOIDECTOMY       BUNIONECTOMY       EXTERNAL FIXATOR APPLICATION Left 5/18/2021    Procedure: CLOSED REDUCTION EXTERNAL FIXATION, LEFT ANKLE FRACTURE;  Surgeon: Dereck Nielson DO;  Location: [facility];  Service: Orthopedics     EYE MUSCLE SURGERY       FOOT CAPSULE RELEASE W/ PERCUTANEOUS HEEL CORD LENGTHENING, TIBIAL TENDON TRANSFER Right      LAPAROSCOPIC HYSTERECTOMY N/A 1/4/2019    Procedure: ROBOTIC TOTAL LAPAROSCOPIC HYSTERECTOMY, BILATERAL SALPINGECTOMY LYSIS OF ADHESIONS;  Surgeon: Jose Golden MD;  Location: [facility];  Service: Gynecology     TYMPANOPLASTY         History of Present Illness  Recent Health  Fever: no  Chills: no  Fatigue: no  Chest Pain: no  Cough: no  Dyspnea: no  Urinary Frequency: no  Nausea: no  Vomiting: no  Diarrhea: no  Abdominal Pain: no  Easy Bruising: no  Lower Extremity Swelling: no  Poor Exercise Tolerance: no    Most recent Health Maintenance Visit:  1 day(s) ago    Pertinent History  Prior Anesthesia: yes  Previous Anesthesia Reaction:  no  Diabetes: no  Cardiovascular Disease:  "no  Pulmonary Disease: no  Renal Disease: no  GI Disease: no  Sleep Apnea: no  Thromboembolic Problems: no  Clotting Disorder: no  Bleeding Disorder: no  Transfusion Reaction: no  Impaired Immunity: no  Steroid use in the last 6 months: no  Frequent Aspirin use: yes hold starting 5/27    Family history of none    Social history of there are no concerns regarding care after surgery    After surgery, the patient plans to recover at a rehabilitation center.    Review of Systems  Review of Systems     Patient denies fever, chills, headache, lightheadedness, dizziness, rhinorrhea, cough, congestion, shortness of breath, chest pain, palpitations, abdominal pain, n/v, diarrhea, constipation, change in appetite, dysuria, frequency, burning or pain with urination.  Other than stated in HPI all other review of systems is negative.           Objective:         Vitals:    05/26/21 0915   BP: 119/84   Pulse: (!) 103   Resp: 20   Temp: 99.1  F (37.3  C)   SpO2: 99%   Weight: 122 lb (55.3 kg)   Height: 5' 5\" (1.651 m)       Physical Exam:  Physical Exam   GENERAL APPEARANCE: Well developed, well nourished, in no acute distress.  HEENT: normocephalic, atraumatic  PERRL, sclerae anicteric, conjunctivae clear and moist, EOM intact  LUNGS: Lung sounds CTA, no adventitious sounds, respiratory effort normal.  CARD: RRR, S1, S2, without murmurs, gallops, rubs,  ABD: Soft, none distended and nontender with normal bowel sounds.   MSK: Muscle strength and tone were equal bilaterally  EXTREMITIES: Left foot with good mobility of toes, good CMS and minimal edema.  Left foot in splint with external fixation device and toes only visible  NEURO: Alert and oriented x 3.  Intellectual impairment  Face is symmetric.  SKIN: Red raised candidal rash of bilateral groins  PSYCH: euthymic             Electronically signed by: Shasha Ocampo CNP  "

## 2021-06-26 NOTE — ANESTHESIA PROCEDURE NOTES
Peripheral Block    Patient location during procedure: pre-op  Start time: 5/28/2021 6:50 AM  End time: 5/28/2021 6:53 AM  post-op analgesia per surgeon order as noted in medical record  Staffing:  Performing  Anesthesiologist: Jose Klein IV, MD  Preanesthetic Checklist  Completed: patient identified, site marked, risks, benefits, and alternatives discussed, timeout performed, consent obtained, airway assessed, oxygen available, suction available, emergency drugs available and hand hygiene performed  Peripheral Block  Block type: saphenous, adductor canal block  Prep: ChloraPrep  Patient position: supine  Patient monitoring: cardiac monitor, continuous pulse oximetry, blood pressure and heart rate  Laterality: left  Injection technique: ultrasound guided    Ultrasound used to visualize needle placement in proximity to nerve being blocked: yes   US used to visualize anesthetic spread  Visualized anatomic structures normal  No Pathological Findings  Permanent ultrasound image captured for medical record  Sterile gel and probe cover used for ultrasound.  Needle  Needle gauge: 21 G  Needle length: 6 in  no peripheral nerve catheter placed  Assessment  Injection assessment: negative aspiration for heme, no paresthesia on injection, incremental injection and no difficulty with injection

## 2021-06-26 NOTE — ANESTHESIA POSTPROCEDURE EVALUATION
Patient: Shannon Christopher  Procedure(s):  LEFT ANKLE EXTERNAL FIXATION REMOVAL (Left)  WITH OPEN REDUCTION INTERNAL FIXATION, TRIMALLEOLAR, LEFT ANKLE (Left)  Anesthesia type: general    Patient location: PACU  Last vitals:   Vitals Value Taken Time   /65 05/28/21 1200   Temp 36.3  C (97.4  F) 05/28/21 1200   Pulse 70 05/28/21 1200   Resp 18 05/28/21 1200   SpO2 99 % 05/28/21 1200     Post vital signs: stable  Level of consciousness: awake and responds to simple questions  Post-anesthesia pain: pain controlled  Post-anesthesia nausea and vomiting: no  Pulmonary: unassisted, return to baseline  Cardiovascular: stable and blood pressure at baseline  Hydration: adequate  Anesthetic events: no    QCDR Measures:  ASA# 11 - Deborah-op Cardiac Arrest: ASA11B - Patient did NOT experience unanticipated cardiac arrest  ASA# 12 - Deborah-op Mortality Rate: ASA12B - Patient did NOT die  ASA# 13 - PACU Re-Intubation Rate: ASA13B - Patient did NOT require a new airway mgmt  ASA# 10 - Composite Anes Safety: ASA10A - No serious adverse event    Additional Notes:

## 2021-06-26 NOTE — ANESTHESIA CARE TRANSFER NOTE
Last vitals:   Vitals:    05/28/21 0956   BP: 128/78   Pulse: 96   Resp: 12   Temp: 36.3  C (97.4  F)   SpO2: 100%     Patient's level of consciousness is drowsy  Spontaneous respirations: yes  Maintains airway independently: yes  Dentition unchanged: yes  Oropharynx: oropharynx clear of all foreign objects    QCDR Measures:  ASA# 20 - Surgical Safety Checklist: WHO surgical safety checklist completed prior to induction    PQRS# 430 - Adult PONV Prevention: 4558F - Pt received => 2 anti-emetic agents (different classes) preop & intraop  ASA# 8 - Peds PONV Prevention: NA - Not pediatric patient, not GA or 2 or more risk factors NOT present  PQRS# 424 - Deborah-op Temp Management: 4559F - At least one body temp DOCUMENTED => 35.5C or 95.9F within required timeframe  PQRS# 426 - PACU Transfer Protocol: - Transfer of care checklist used  ASA# 14 - Acute Post-op Pain: ASA14B - Patient did NOT experience pain >= 7 out of 10

## 2021-06-26 NOTE — PROGRESS NOTES
Code Status:  FULL CODE  Visit Type: Follow-up (Trimalleolar fracture, s/p ORIF)     Facility:  [facility] [[id number]]      Facility Type: SNF (Skilled Nursing Facility, TCU)    History of Present Illness:   Hospital Admission Date: 5/17/2021 Hospital Discharge Date: 5/20/2021      Shannon Christopher is a 31 y.o. female with a past medical history for cerebral palsy, intellectual impairment, borderline personality, depression with anxiety.  Recently hospitalized after a mechanical fall in which she sustained a trimalleolar fracture.  She underwent external fixation and open reduction with internal fixation and splinting.  She was to remain nonweightbearing of her left leg and leave her splint in place.  She underwent a ORIF on 5/28.     Today, she reports her pain is pretty stable.  She is taking dilaudid every other day or so x 1 dose.  She is taking ketoralac about 1 x daily and hydroxyzine 2-3x daily.  She feels the hydroxyzine really helps.  She has a hard cast on her left leg and was seen by orthopedics yesterday who placed a new cast.  She was started on Keflex by ortho for presumed incision infection.          Current Outpatient Medications   Medication Sig Dispense Refill     cephalexin (KEFLEX) 500 MG capsule Take 500 mg by mouth 4 (four) times a day.       HYDROmorphone (DILAUDID) 2 MG tablet Take 2-4 mg by mouth every 4 (four) hours as needed for pain.       ketorolac (TORADOL) 10 mg tablet Take 10 mg by mouth every 6 (six) hours as needed for pain.       acetaminophen (TYLENOL) 500 MG tablet Take 1,000 mg by mouth 3 (three) times a day.       aspirin 81 MG EC tablet Take 1 tablet (81 mg total) by mouth 2 (two) times a day.  0     divalproex (DEPAKOTE ER) 500 MG 24 hour tablet Take 500 mg by mouth at bedtime.        estradioL (ESTRACE) 2 MG tablet Take 2 mg by mouth at bedtime.        hydrOXYzine HCL (ATARAX) 25 MG tablet Take 1 tablet (25 mg total) by mouth every 6 (six) hours as needed  for itching (with pain, moderate pain). 30 tablet 0     LATUDA 60 mg Tab tablet Take 60 mg by mouth at bedtime.        nystatin (MYCOSTATIN) cream Apply topically 3 (three) times a day as needed for dry skin.       OLANZapine (ZYPREXA) 7.5 MG tablet Take 7.5 mg by mouth at bedtime.        polyethylene glycol (MIRALAX) 17 gram packet Take 1 packet (17 g total) by mouth daily. 7 packet 0     senna-docusate (PERICOLACE) 8.6-50 mg tablet Take 1-2 tablets by mouth 2 (two) times a day. Take while on oral narcotics to prevent or treat constipation. 30 tablet 0     zonisamide (ZONEGRAN) 100 MG capsule Take 400 mg by mouth at bedtime.        No current facility-administered medications for this visit.          Review of Systems   Patient denies fever, chills, headache, lightheadedness, dizziness, rhinorrhea, cough, congestion, shortness of breath, chest pain, palpitations, abdominal pain, n/v, diarrhea, constipation, change in appetite, dysuria, frequency, burning or pain with urination.  Other than stated in HPI all other review of systems is negative.     Physical Exam   Vitals:    06/11/21 1019   BP: 127/54   Pulse: 68   Resp: 18   Temp: 98  F (36.7  C)   SpO2: 100%        GENERAL APPEARANCE: Well developed, well nourished, in no acute distress.  HEENT: normocephalic, atraumatic  PERRL, sclerae anicteric, conjunctivae clear and moist, EOM intact  LUNGS:respiratory effort normal.  MSK: Muscle strength and tone were equal bilaterally  EXTREMITIES: Left foot with good mobility of toes, good CMS and minimal edema. LLE in hard cast. No calf tenderness  NEURO: Alert and oriented x 3.  Intellectual impairment  Face is symmetric.  SKIN: no new rashes, petechiae   PSYCH: euthymic        Labs:  No results found for this or any previous visit (from the past 240 hour(s)).      Assessment:  1. Trimalleolar fracture of ankle, closed, left, sequela     2. S/P ORIF (open reduction internal fixation) fracture     3. Anxiety and depression          Plan:   Tri malleolar fracture status post ORIF fixation.  Using minimal dilaudid, atarax and ketoralac. On Keflex thru 6/20 for presumed incision infection.   Follow up with ortho as directed.  Continue with NWB.     Depression with anxiety: anxiety improved with prn use of atarax.  Continue on home depakote, latuda, zyprexa and zonisamide.     Bowels managed with daily MiraLAX and senna S.    Electronically signed by: Shasha Ocampo, CNP

## 2021-06-26 NOTE — ANESTHESIA PROCEDURE NOTES
Peripheral Block    Patient location during procedure: pre-op  Start time: 5/28/2021 6:45 AM  End time: 5/28/2021 6:50 AM  post-op analgesia per surgeon order as noted in medical record  Staffing:  Performing  Anesthesiologist: Jose Klein IV, MD  Preanesthetic Checklist  Completed: patient identified, site marked, risks, benefits, and alternatives discussed, timeout performed, consent obtained, at patient's request, airway assessed, oxygen available, suction available, emergency drugs available and hand hygiene performed  Peripheral Block  Block type: sciatic, popliteal  Prep: ChloraPrep  Patient position: supine  Patient monitoring: cardiac monitor, continuous pulse oximetry, blood pressure and heart rate  Laterality: left  Injection technique: ultrasound guided    Ultrasound used to visualize needle placement in proximity to nerve being blocked: yes   US used to visualize anesthetic spread  Visualized anatomic structures normal  No Pathological Findings  Permanent ultrasound image captured for medical record  Sterile gel and probe cover used for ultrasound.  Needle  Needle gauge: 21 G  Needle length: 6 in  no peripheral nerve catheter placed  Assessment  Injection assessment: negative aspiration for heme, no paresthesia on injection, incremental injection and no difficulty with injection

## 2021-07-04 NOTE — LETTER
Letter by Shasha Ocampo CNP at      Author: Shasha Ocampo CNP Service: -- Author Type: --    Filed:  Encounter Date: 5/25/2021 Status: (Other)         [facility] [address] [address]                                  May 25, 2021    Patient: Shannon Christopher   MR Number: [id number]   YOB: 1990   Date of Visit: 5/25/2021     Dear Dr. Solis:    Thank you for referring Shannon Christopher to me for evaluation. Below are the relevant portions of my assessment and plan of care.    If you have questions, please do not hesitate to call me. I look forward to following Shannon along with you.    Sincerely,        Shasha Ocampo CNP          CC  No Recipients  Shasha Ocampo CNP  5/25/2021  3:49 PM  Signed  Code Status:  FULL CODE  Visit Type: Hospital Visit Follow Up (Trimalleolar fracture, status post external fixation, pain management, rash)     Facility:  [facility] [[id number]]      Facility Type: SNF (Skilled Nursing Facility, TCU)    History of Present Illness:   Hospital Admission Date: 5/17/2021 Hospital Discharge Date: 5/20/2021      Shannon Christopher is a 31 y.o. female with a past medical history for cerebral palsy, intellectual impairment, borderline personality, depression with anxiety.  Recently hospitalized after a mechanical fall in which she sustained a trimalleolar fracture.  She underwent external fixation and open reduction with internal fixation and splinting.  She was to remain nonweightbearing of her left leg and leave her splint in place.  She has a follow-up with orthopedics on 5/25/2020.    Today, she reports that she is having extreme pain and feels that 5 mg of oxycodone does not always manage her pain.  Chart review reveals she was taking oxycodone 5-10 mg every 4 hours as needed in the hospital.  She has good CMS to her toes and is able to move those easily with minimal edema.  Her APAP is only as needed at this  time.  She does complain of an itchy raised red rash of her groin area.  Does not have any discharge.    Past Medical History:   Diagnosis Date   ? ADD (attention deficit disorder)    ? Anxiety    ? Borderline mental retardation    ? Borderline personality disorder (H)    ? Cerebral palsy (H)    ? Depression    ? PONV (postoperative nausea and vomiting)    ? Uterine fibroid      Past Surgical History:   Procedure Laterality Date   ? ADENOIDECTOMY     ? BUNIONECTOMY     ? EXTERNAL FIXATOR APPLICATION Left 5/18/2021    Procedure: CLOSED REDUCTION EXTERNAL FIXATION, LEFT ANKLE FRACTURE;  Surgeon: Dereck Nielson DO;  Location: [facility];  Service: Orthopedics   ? EYE MUSCLE SURGERY     ? FOOT CAPSULE RELEASE W/ PERCUTANEOUS HEEL CORD LENGTHENING, TIBIAL TENDON TRANSFER Right    ? LAPAROSCOPIC HYSTERECTOMY N/A 1/4/2019    Procedure: ROBOTIC TOTAL LAPAROSCOPIC HYSTERECTOMY, BILATERAL SALPINGECTOMY LYSIS OF ADHESIONS;  Surgeon: Jose Golden MD;  Location: [facility];  Service: Gynecology   ? TYMPANOPLASTY       No family history on file.  Social History     Socioeconomic History   ? Marital status: Single     Spouse name: Not on file   ? Number of children: Not on file   ? Years of education: Not on file   ? Highest education level: Not on file   Occupational History   ? Not on file   Social Needs   ? Financial resource strain: Not on file   ? Food insecurity     Worry: Not on file     Inability: Not on file   ? Transportation needs     Medical: Not on file     Non-medical: Not on file   Tobacco Use   ? Smoking status: Current Some Day Smoker   ? Smokeless tobacco: Never Used   ? Tobacco comment: < 1 PPW   Substance and Sexual Activity   ? Alcohol use: Yes     Comment: rarely   ? Drug use: No   ? Sexual activity: Not on file   Lifestyle   ? Physical activity     Days per week: Not on file     Minutes per session: Not on file   ? Stress: Not on file   Relationships   ? Social connections      Talks on phone: Not on file     Gets together: Not on file     Attends Sabianism service: Not on file     Active member of club or organization: Not on file     Attends meetings of clubs or organizations: Not on file     Relationship status: Not on file   ? Intimate partner violence     Fear of current or ex partner: Not on file     Emotionally abused: Not on file     Physically abused: Not on file     Forced sexual activity: Not on file   Other Topics Concern   ? Not on file   Social History Narrative    Patient arrived alone to the ED 08/13/17       Current Outpatient Medications   Medication Sig Dispense Refill   ? acetaminophen (TYLENOL) 325 MG tablet Take 2 tablets (650 mg total) by mouth every 4 (four) hours as needed for pain (mild pain). 100 tablet 0   ? aspirin 325 MG tablet Take 1 tablet (325 mg total) by mouth daily. 30 tablet 0   ? divalproex (DEPAKOTE ER) 500 MG 24 hour tablet Take 500 mg by mouth at bedtime.      ? estradioL (ESTRACE) 2 MG tablet Take 2 mg by mouth at bedtime.      ? hydrOXYzine HCL (ATARAX) 25 MG tablet Take 1 tablet (25 mg total) by mouth every 6 (six) hours as needed for itching (with pain, moderate pain). 30 tablet 0   ? LATUDA 60 mg Tab tablet Take 60 mg by mouth at bedtime.      ? OLANZapine (ZYPREXA) 7.5 MG tablet Take 7.5 mg by mouth at bedtime.      ? oxyCODONE (ROXICODONE) 5 MG immediate release tablet Take 1 tablet (5 mg total) by mouth every 4 (four) hours as needed for pain (moderate to severe pain). (Patient taking differently: Take 5-7.5 mg by mouth every 4 (four) hours as needed for pain (moderate to severe pain). ) 30 tablet 0   ? polyethylene glycol (MIRALAX) 17 gram packet Take 1 packet (17 g total) by mouth daily. 7 packet 0   ? senna-docusate (PERICOLACE) 8.6-50 mg tablet Take 1-2 tablets by mouth 2 (two) times a day. Take while on oral narcotics to prevent or treat constipation. 30 tablet 0   ? zonisamide (ZONEGRAN) 100 MG capsule Take 400 mg by mouth at bedtime.         No current facility-administered medications for this visit.      No Known Allergies    There is no immunization history on file for this patient.    Post Discharge Medication Reconciliation Status: discharge medications reconciled and changed, per note/orders    Review of Systems   Patient denies fever, chills, headache, lightheadedness, dizziness, rhinorrhea, cough, congestion, shortness of breath, chest pain, palpitations, abdominal pain, n/v, diarrhea, constipation, change in appetite, dysuria, frequency, burning or pain with urination.  Other than stated in HPI all other review of systems is negative.         Physical Exam   Vitals:    05/25/21 1039   BP: 127/88   Pulse: 86   Resp: 20   Temp: 99.1  F (37.3  C)   SpO2: 99%       GENERAL APPEARANCE: Well developed, well nourished, in no acute distress.  HEENT: normocephalic, atraumatic  PERRL, sclerae anicteric, conjunctivae clear and moist, EOM intact  LUNGS: Lung sounds CTA, no adventitious sounds, respiratory effort normal.  CARD: RRR, S1, S2, without murmurs, gallops, rubs,  ABD: Soft, none distended and nontender with normal bowel sounds.   MSK: Muscle strength and tone were equal bilaterally  EXTREMITIES: Left foot with good mobility of toes, good CMS and minimal edema.  Left foot in splint with external fixation device and toes only visible  NEURO: Alert and oriented x 3.  Intellectual impairment  Face is symmetric.  SKIN: Red raised candidal rash of bilateral groins  PSYCH: euthymic        Labs:  Recent Results (from the past 240 hour(s))   Drugs of Abuse 1,Urine   Result Value Ref Range    Amphetamines (!) Screen Negative     Screen Positive (Confirmation available on request)    Benzodiazepines Screen Negative Screen Negative    Opiates Screen Negative Screen Negative    Phencyclidine Screen Negative Screen Negative    THC Screen Negative Screen Negative    Barbiturates Screen Negative Screen Negative    Cocaine Metabolite Screen Negative Screen  Negative    Oxycodone (!) Screen Negative     Screen Positive (Confirmation available on request)    Creatinine, Urine 120.2 mg/dL   HM2(CBC w/o Differential)   Result Value Ref Range    WBC 7.6 4.0 - 11.0 thou/uL    RBC 4.40 3.80 - 5.40 mill/uL    Hemoglobin 13.2 12.0 - 16.0 g/dL    Hematocrit 38.9 35.0 - 47.0 %    MCV 88 80 - 100 fL    MCH 30.0 27.0 - 34.0 pg    MCHC 33.9 32.0 - 36.0 g/dL    RDW 12.0 11.0 - 14.5 %    Platelets 260 140 - 440 thou/uL    MPV 10.6 8.5 - 12.5 fL   Comprehensive Metabolic Panel   Result Value Ref Range    Sodium 141 136 - 145 mmol/L    Potassium 3.3 (L) 3.5 - 5.0 mmol/L    Chloride 106 98 - 107 mmol/L    CO2 21 (L) 22 - 31 mmol/L    Anion Gap, Calculation 14 5 - 18 mmol/L    Glucose 90 70 - 125 mg/dL    BUN 17 8 - 22 mg/dL    Creatinine 0.77 0.60 - 1.10 mg/dL    GFR MDRD Af Amer >60 >60 mL/min/1.73m2    GFR MDRD Non Af Amer >60 >60 mL/min/1.73m2    Bilirubin, Total 0.7 0.0 - 1.0 mg/dL    Calcium 8.9 8.5 - 10.5 mg/dL    Protein, Total 6.9 6.0 - 8.0 g/dL    Albumin 3.9 3.5 - 5.0 g/dL    Alkaline Phosphatase 58 45 - 120 U/L     (H) 0 - 40 U/L    ALT 68 (H) 0 - 45 U/L   Beta-hCG, Qualitative, Serum   Result Value Ref Range    Beta hCG Qualitative Negative Negative   Asymptomatic SARS-CoV-2 (COVID-19)-PCR    Specimen: Respiratory   Result Value Ref Range    SARS-CoV-2 PCR Result Negative Negative, Invalid   Potassium   Result Value Ref Range    Potassium 3.7 3.5 - 5.0 mmol/L   Potassium - Next AM   Result Value Ref Range    Potassium 4.1 3.5 - 5.0 mmol/L   Hemoglobin POD1 and POD2   Result Value Ref Range    Hemoglobin 11.1 (L) 12.0 - 16.0 g/dL   Hemoglobin POD1 and POD2   Result Value Ref Range    Hemoglobin 10.5 (L) 12.0 - 16.0 g/dL   Potassium - Next AM   Result Value Ref Range    Potassium 3.7 3.5 - 5.0 mmol/L         Assessment:  1. Trimalleolar fracture of ankle, closed, left, sequela     2. Candidal dermatitis     3. Anxiety and depression     4. Cerebral palsy, unspecified  type (H)     5. Borderline personality disorder (H)         Plan:   Tri malleolar fracture status post external fixation.  Follow-up surgery planned for 5/28.  She will be seeing orthopedics today.  Pain management is poor at this time so we will increase her oxycodone to 5-7.5 mg every 4 hours and could consider increasing that if needed.  Will schedule Tylenol 1000 mg 3 times daily and counseled patient on this change in the rationale to decrease her pain threshold.  Does have Atarax to potentiate her oxycodone however nursing does not always have good understanding of this plan will monitor and consider scheduling if needed.    Bowels managed with daily MiraLAX and senna S.    Candidal dermatitis: Nystatin cream 3 times daily until rash resolved     depression with anxiety: Appearing stable at this time, on Depakote, Latuda, Zyprexa and zonisamide.    cerebral palsy: Compensated, continue to monitor    Borderline personality disorder: Also has developmental disabilities that can play into anxiety.  Will need to monitor and communicate closely with the patient.    35 total minutes spent with 20 minutes spent in counseling and reassuring patient on pain management, medication regimen and plan for dermatitis.     Electronically signed by: Shasha Ocampo, CNP

## 2021-07-04 NOTE — LETTER
Letter by Shasha Ocampo CNP at      Author: Shasha Ocampo CNP Service: -- Author Type: --    Filed:  Encounter Date: 6/1/2021 Status: (Other)         [facility]- [address] [address]                                  June 1, 2021    Patient: Shannon Christopher   MR Number: [id number]   YOB: 1990   Date of Visit: 6/1/2021     Dear Dr. Solis:    Thank you for referring Shannon Christopher to me for evaluation. Below are the relevant portions of my assessment and plan of care.    If you have questions, please do not hesitate to call me. I look forward to following Shannon along with you.    Sincerely,        Shasha Ocampo CNP          CC  No Recipients  Shasha Ocampo CNP  6/1/2021  1:29 PM  Signed  Code Status:  FULL CODE  Visit Type: Follow-up (Trimalleolar fracture, s/p ORIF)     Facility:  [facility] [[id number]]      Facility Type: SNF (Skilled Nursing Facility, TCU)    History of Present Illness:   Hospital Admission Date: 5/17/2021 Hospital Discharge Date: 5/20/2021      Shannon Christopher is a 31 y.o. female with a past medical history for cerebral palsy, intellectual impairment, borderline personality, depression with anxiety.  Recently hospitalized after a mechanical fall in which she sustained a trimalleolar fracture.  She underwent external fixation and open reduction with internal fixation and splinting.  She was to remain nonweightbearing of her left leg and leave her splint in place.  She has a follow-up with orthopedics on 5/25/2020.    Today, I see the patient in follow up after having a ORIF of trimalleolar fracture on 5/28.  She denies any pain and reports trying to minimize the amount of pain meds she is taking.  She was switched to Ketoralac and dilaudid during her surgery.  She continues on APAP scheduled.  She is to work on walking with a walker with NWB on LLE today.  She is requesting to be discharged soon.      Past  Medical History:   Diagnosis Date   ? ADD (attention deficit disorder)    ? Anxiety    ? Borderline mental retardation    ? Borderline personality disorder (H)    ? Cerebral palsy (H)    ? Depression    ? PONV (postoperative nausea and vomiting)    ? Uterine fibroid      Past Surgical History:   Procedure Laterality Date   ? ADENOIDECTOMY     ? BUNIONECTOMY     ? EXTERNAL FIXATOR APPLICATION Left 5/18/2021    Procedure: CLOSED REDUCTION EXTERNAL FIXATION, LEFT ANKLE FRACTURE;  Surgeon: Dereck Nielson DO;  Location: [facility];  Service: Orthopedics   ? EYE MUSCLE SURGERY     ? FOOT CAPSULE RELEASE W/ PERCUTANEOUS HEEL CORD LENGTHENING, TIBIAL TENDON TRANSFER Right    ? LAPAROSCOPIC HYSTERECTOMY N/A 1/4/2019    Procedure: ROBOTIC TOTAL LAPAROSCOPIC HYSTERECTOMY, BILATERAL SALPINGECTOMY LYSIS OF ADHESIONS;  Surgeon: Jose Golden MD;  Location: [facility];  Service: Gynecology   ? TYMPANOPLASTY       No family history on file.  Social History     Socioeconomic History   ? Marital status: Single     Spouse name: Not on file   ? Number of children: Not on file   ? Years of education: Not on file   ? Highest education level: Not on file   Occupational History   ? Not on file   Social Needs   ? Financial resource strain: Not on file   ? Food insecurity     Worry: Not on file     Inability: Not on file   ? Transportation needs     Medical: Not on file     Non-medical: Not on file   Tobacco Use   ? Smoking status: Current Some Day Smoker   ? Smokeless tobacco: Never Used   ? Tobacco comment: < 1 PPW   Substance and Sexual Activity   ? Alcohol use: Yes     Comment: rarely   ? Drug use: No   ? Sexual activity: Not on file   Lifestyle   ? Physical activity     Days per week: Not on file     Minutes per session: Not on file   ? Stress: Not on file   Relationships   ? Social connections     Talks on phone: Not on file     Gets together: Not on file     Attends Mosque service: Not on file      Active member of club or organization: Not on file     Attends meetings of clubs or organizations: Not on file     Relationship status: Not on file   ? Intimate partner violence     Fear of current or ex partner: Not on file     Emotionally abused: Not on file     Physically abused: Not on file     Forced sexual activity: Not on file   Other Topics Concern   ? Not on file   Social History Narrative    Patient arrived alone to the ED 08/13/17       Current Outpatient Medications   Medication Sig Dispense Refill   ? acetaminophen (TYLENOL) 500 MG tablet Take 1,000 mg by mouth 3 (three) times a day.     ? aspirin 81 MG EC tablet Take 1 tablet (81 mg total) by mouth 2 (two) times a day.  0   ? divalproex (DEPAKOTE ER) 500 MG 24 hour tablet Take 500 mg by mouth at bedtime.      ? estradioL (ESTRACE) 2 MG tablet Take 2 mg by mouth at bedtime.      ? HYDROmorphone (DILAUDID) 2 MG tablet Take 1-2 tablets (2-4 mg total) by mouth every 4 (four) hours as needed for pain. 30 tablet 0   ? hydrOXYzine HCL (ATARAX) 25 MG tablet Take 1 tablet (25 mg total) by mouth every 6 (six) hours as needed for itching (with pain, moderate pain). 30 tablet 0   ? ketorolac (TORADOL) 10 mg tablet Take 1 tablet (10 mg total) by mouth every 6 (six) hours as needed for pain. 20 tablet 0   ? LATUDA 60 mg Tab tablet Take 60 mg by mouth at bedtime.      ? nystatin (MYCOSTATIN) cream Apply topically 3 (three) times a day as needed for dry skin.     ? OLANZapine (ZYPREXA) 7.5 MG tablet Take 7.5 mg by mouth at bedtime.      ? polyethylene glycol (MIRALAX) 17 gram packet Take 1 packet (17 g total) by mouth daily. 7 packet 0   ? senna-docusate (PERICOLACE) 8.6-50 mg tablet Take 1-2 tablets by mouth 2 (two) times a day. Take while on oral narcotics to prevent or treat constipation. 30 tablet 0   ? zonisamide (ZONEGRAN) 100 MG capsule Take 400 mg by mouth at bedtime.        No current facility-administered medications for this visit.      No Known  Allergies  Immunization History   Administered Date(s) Administered   ? COVID-19,PF,Moderna 03/03/2021, 03/31/2021       Post Discharge Medication Reconciliation Status: discharge medications reconciled and changed, per note/orders    Review of Systems   Patient denies fever, chills, headache, lightheadedness, dizziness, rhinorrhea, cough, congestion, shortness of breath, chest pain, palpitations, abdominal pain, n/v, diarrhea, constipation, change in appetite, dysuria, frequency, burning or pain with urination.  Other than stated in HPI all other review of systems is negative.         Physical Exam   Vitals:    06/01/21 0926   BP: 132/84   Pulse: 86   Resp: 20   Temp: 99.4  F (37.4  C)   SpO2: 100%       GENERAL APPEARANCE: Well developed, well nourished, in no acute distress.  HEENT: normocephalic, atraumatic  PERRL, sclerae anicteric, conjunctivae clear and moist, EOM intact  LUNGS: Lung sounds CTA, no adventitious sounds, respiratory effort normal.  CARD: RRR, S1, S2, without murmurs, gallops, rubs,  ABD: Soft, none distended and nontender with normal bowel sounds.   MSK: Muscle strength and tone were equal bilaterally  EXTREMITIES: Left foot with good mobility of toes, good CMS and minimal edema. LLE in hard cast. No calf tenderness  NEURO: Alert and oriented x 3.  Intellectual impairment  Face is symmetric.  SKIN: no new rashes, petechiae   PSYCH: euthymic        Labs:  Recent Results (from the past 240 hour(s))   COVID-19 Virus PCR MRF    Specimen: Respiratory   Result Value Ref Range    COVID-19 VIRUS SPECIMEN SOURCE Nasopharyngeal     2019-nCOV       Test received-See reflex to IDDL test SARS CoV2 (COVID-19) Virus RT-PCR   SARS-CoV-2 (COVID-19)-PCR    Specimen: Respiratory   Result Value Ref Range    SARS-CoV-2 Virus Specimen Source Nasopharyngeal     SARS-CoV-2 PCR Result NEGATIVE     SARS-COV-2 PCR COMMENT       Testing was performed using the [employer]pt[employer] SARS-CoV-2 Assay on the [employer]   Pregnancy, Urine    Result Value Ref Range    Pregnancy Test, Urine Negative Negative   Basic Metabolic Panel   Result Value Ref Range    Sodium 138 136 - 145 mmol/L    Potassium 3.4 (L) 3.5 - 5.0 mmol/L    Chloride 104 98 - 107 mmol/L    CO2 23 22 - 31 mmol/L    Anion Gap, Calculation 11 5 - 18 mmol/L    Glucose 113 70 - 125 mg/dL    Calcium 9.0 8.5 - 10.5 mg/dL    BUN 11 8 - 22 mg/dL    Creatinine 0.68 0.60 - 1.10 mg/dL    GFR MDRD Af Amer >60 >60 mL/min/1.73m2    GFR MDRD Non Af Amer >60 >60 mL/min/1.73m2   Glycosylated Hemoglobin A1C   Result Value Ref Range    Hemoglobin A1c 5.1 <=5.6 %   HM2(CBC w/o Differential)   Result Value Ref Range    WBC 6.7 4.0 - 11.0 thou/uL    RBC 4.31 3.80 - 5.40 mill/uL    Hemoglobin 13.0 12.0 - 16.0 g/dL    Hematocrit 38.2 35.0 - 47.0 %    MCV 89 80 - 100 fL    MCH 30.2 27.0 - 34.0 pg    MCHC 34.0 32.0 - 36.0 g/dL    RDW 11.9 11.0 - 14.5 %    Platelets 394 140 - 440 thou/uL    MPV 10.3 8.5 - 12.5 fL   Hemoglobin POD1 and POD2   Result Value Ref Range    Hemoglobin 11.6 (L) 12.0 - 16.0 g/dL   Urinalysis-UC if Indicated   Result Value Ref Range    Color, UA Colorless Light Yellow, Yellow    Clarity, UA Clear Clear    Glucose, UA Negative Negative    Protein, UA Negative Negative    Bilirubin, UA Negative Negative    Urobilinogen, UA <2.0 mg/dL <2.0 mg/dL    pH, UA 7.5 5.0 - 8.0    Blood, UA Negative Negative    Ketones, UA Negative Negative    Nitrite, UA Negative Negative    Leukocytes, UA Negative Negative    Specific Gravity, UA 1.009 1.001 - 1.030         Assessment:  1. Trimalleolar fracture of ankle, closed, left, sequela     2. Depression, unspecified depression type     3. Cerebral palsy, unspecified type (H)     4. Borderline personality disorder (H)     5. S/P ORIF (open reduction internal fixation) fracture         Plan:   Tri malleolar fracture status post ORIF fixation.  Follow-up surgery planned for 5/28.  Using minimal dilaudid, atarax and ketoralac.  Did use dilaudid 3 x yesterday.   Will need wheelchair for mobility at discharge.     Depression with anxiety: feeling anxious about being in the facility and is requesting discharge.  I did update SW and therapy to assess for plan of care.  Continue on home depakote, latuda, zyprexa and zonisamide.     Bowels managed with daily MiraLAX and senna S.    Candidal dermatitis: Nystatin cream 3 times daily until rash resolved    cerebral palsy: Compensated, continue to monitor    Borderline personality disorder: Also has developmental disabilities that can play into anxiety.  Will need to monitor and communicate closely with the patient.        Electronically signed by: Shasha Ocampo, CNP

## 2021-07-04 NOTE — LETTER
Letter by Shasha Ocampo CNP at      Author: Shasha Ocampo CNP Service: -- Author Type: --    Filed:  Encounter Date: 6/11/2021 Status: (Other)         [facility]- [address] [address]                                  June 11, 2021    Patient: Shannon Christopher   MR Number: [id number]   YOB: 1990   Date of Visit: 6/11/2021     Dear Dr. Solis:    Thank you for referring Shannon Christopher to me for evaluation. Below are the relevant portions of my assessment and plan of care.    If you have questions, please do not hesitate to call me. I look forward to following Shannon along with you.    Sincerely,        Shasha Ocampo CNP          CC  No Recipients  Shasha Ocampo CNP  6/11/2021  2:46 PM  Signed  Code Status:  FULL CODE  Visit Type: Follow-up (Trimalleolar fracture, s/p ORIF)     Facility:  [facility] [[id number]]      Facility Type: SNF (Skilled Nursing Facility, TCU)    History of Present Illness:   Hospital Admission Date: 5/17/2021 Hospital Discharge Date: 5/20/2021      Shannon Christopher is a 31 y.o. female with a past medical history for cerebral palsy, intellectual impairment, borderline personality, depression with anxiety.  Recently hospitalized after a mechanical fall in which she sustained a trimalleolar fracture.  She underwent external fixation and open reduction with internal fixation and splinting.  She was to remain nonweightbearing of her left leg and leave her splint in place.  She underwent a ORIF on 5/28.     Today, she reports her pain is pretty stable.  She is taking dilaudid every other day or so x 1 dose.  She is taking ketoralac about 1 x daily and hydroxyzine 2-3x daily.  She feels the hydroxyzine really helps.  She has a hard cast on her left leg and was seen by orthopedics yesterday who placed a new cast.  She was started on Keflex by ortho for presumed incision infection.          Current Outpatient  Medications   Medication Sig Dispense Refill   ? cephalexin (KEFLEX) 500 MG capsule Take 500 mg by mouth 4 (four) times a day.     ? HYDROmorphone (DILAUDID) 2 MG tablet Take 2-4 mg by mouth every 4 (four) hours as needed for pain.     ? ketorolac (TORADOL) 10 mg tablet Take 10 mg by mouth every 6 (six) hours as needed for pain.     ? acetaminophen (TYLENOL) 500 MG tablet Take 1,000 mg by mouth 3 (three) times a day.     ? aspirin 81 MG EC tablet Take 1 tablet (81 mg total) by mouth 2 (two) times a day.  0   ? divalproex (DEPAKOTE ER) 500 MG 24 hour tablet Take 500 mg by mouth at bedtime.      ? estradioL (ESTRACE) 2 MG tablet Take 2 mg by mouth at bedtime.      ? hydrOXYzine HCL (ATARAX) 25 MG tablet Take 1 tablet (25 mg total) by mouth every 6 (six) hours as needed for itching (with pain, moderate pain). 30 tablet 0   ? LATUDA 60 mg Tab tablet Take 60 mg by mouth at bedtime.      ? nystatin (MYCOSTATIN) cream Apply topically 3 (three) times a day as needed for dry skin.     ? OLANZapine (ZYPREXA) 7.5 MG tablet Take 7.5 mg by mouth at bedtime.      ? polyethylene glycol (MIRALAX) 17 gram packet Take 1 packet (17 g total) by mouth daily. 7 packet 0   ? senna-docusate (PERICOLACE) 8.6-50 mg tablet Take 1-2 tablets by mouth 2 (two) times a day. Take while on oral narcotics to prevent or treat constipation. 30 tablet 0   ? zonisamide (ZONEGRAN) 100 MG capsule Take 400 mg by mouth at bedtime.        No current facility-administered medications for this visit.          Review of Systems   Patient denies fever, chills, headache, lightheadedness, dizziness, rhinorrhea, cough, congestion, shortness of breath, chest pain, palpitations, abdominal pain, n/v, diarrhea, constipation, change in appetite, dysuria, frequency, burning or pain with urination.  Other than stated in HPI all other review of systems is negative.     Physical Exam   Vitals:    06/11/21 1019   BP: 127/54   Pulse: 68   Resp: 18   Temp: 98  F (36.7  C)    SpO2: 100%        GENERAL APPEARANCE: Well developed, well nourished, in no acute distress.  HEENT: normocephalic, atraumatic  PERRL, sclerae anicteric, conjunctivae clear and moist, EOM intact  LUNGS:respiratory effort normal.  MSK: Muscle strength and tone were equal bilaterally  EXTREMITIES: Left foot with good mobility of toes, good CMS and minimal edema. LLE in hard cast. No calf tenderness  NEURO: Alert and oriented x 3.  Intellectual impairment  Face is symmetric.  SKIN: no new rashes, petechiae   PSYCH: euthymic        Labs:  No results found for this or any previous visit (from the past 240 hour(s)).      Assessment:  1. Trimalleolar fracture of ankle, closed, left, sequela     2. S/P ORIF (open reduction internal fixation) fracture     3. Anxiety and depression         Plan:   Tri malleolar fracture status post ORIF fixation.  Using minimal dilaudid, atarax and ketoralac. On Keflex thru 6/20 for presumed incision infection.   Follow up with ortho as directed.  Continue with NWB.     Depression with anxiety: anxiety improved with prn use of atarax.  Continue on home depakote, latuda, zyprexa and zonisamide.     Bowels managed with daily MiraLAX and senna S.    Electronically signed by: Shasha Ocampo CNP

## 2021-07-04 NOTE — LETTER
Letter by Shasha Ocampo CNP at      Author: Shasha Ocampo CNP Service: -- Author Type: --    Filed:  Encounter Date: 5/26/2021 Status: (Other)         [facility]  [address] [address]                                  May 26, 2021    Patient: Shannon Christopher   MR Number: [id number]   YOB: 1990   Date of Visit: 5/26/2021     Dear Dr. Solis:    Thank you for referring Shannon Christopher to me for evaluation. Below are the relevant portions of my assessment and plan of care.    If you have questions, please do not hesitate to call me. I look forward to following Shannon along with you.    Sincerely,        Shasha Ocampo CNP          CC  No Recipients  Shasha Ocampo CNP  5/26/2021  1:41 PM  Signed  [facility]    Facility:   [facility] [[id number]]   Code Status: FULL CODE  PCP: Sveta Sandoval MD   Phone: [phone]   Fax: [phone]      Assessment/Plan:     Shannon Christopher is a 31 y.o. female with a past medical history for cerebral palsy, intellectual impairment, borderline personality, depression with anxiety.  Recently hospitalized after a mechanical fall in which she sustained a trimalleolar fracture.  She underwent external fixation and open reduction with internal fixation and splinting.  She was to remain nonweightbearing of her left leg and leave her splint in place.      Visit for Preoperative Exam.     Patient approved for surgery with general or local anesthesia.     Cardiac Risk: Revised Cardiac risk index; Low risk    Respiratory Risk: ARISCAT;  Low risk    Labs ordered as requested and will be faxed to surgeon's office.      ASA held starting 5/27    Subjective:     Scheduled Procedure: ORIF  Surgery Date:  5/28/2021        Current Outpatient Medications   Medication Sig Dispense Refill   ? acetaminophen (TYLENOL) 325 MG tablet Take 2 tablets (650 mg total) by mouth every 4 (four) hours  as needed for pain (mild pain). 100 tablet 0   ? aspirin 325 MG tablet Take 1 tablet (325 mg total) by mouth daily. 30 tablet 0   ? divalproex (DEPAKOTE ER) 500 MG 24 hour tablet Take 500 mg by mouth at bedtime.      ? estradioL (ESTRACE) 2 MG tablet Take 2 mg by mouth at bedtime.      ? hydrOXYzine HCL (ATARAX) 25 MG tablet Take 1 tablet (25 mg total) by mouth every 6 (six) hours as needed for itching (with pain, moderate pain). 30 tablet 0   ? LATUDA 60 mg Tab tablet Take 60 mg by mouth at bedtime.      ? OLANZapine (ZYPREXA) 7.5 MG tablet Take 7.5 mg by mouth at bedtime.      ? oxyCODONE (ROXICODONE) 5 MG immediate release tablet Take 1 tablet (5 mg total) by mouth every 4 (four) hours as needed for pain (moderate to severe pain). (Patient taking differently: Take 5-7.5 mg by mouth every 4 (four) hours as needed for pain (moderate to severe pain). ) 30 tablet 0   ? polyethylene glycol (MIRALAX) 17 gram packet Take 1 packet (17 g total) by mouth daily. 7 packet 0   ? senna-docusate (PERICOLACE) 8.6-50 mg tablet Take 1-2 tablets by mouth 2 (two) times a day. Take while on oral narcotics to prevent or treat constipation. 30 tablet 0   ? zonisamide (ZONEGRAN) 100 MG capsule Take 400 mg by mouth at bedtime.        No current facility-administered medications for this visit.        No Known Allergies    Immunization History   Administered Date(s) Administered   ? COVID-19,PF,Moderna 03/03/2021, 03/31/2021       Patient Active Problem List   Diagnosis   ? Pelvic pain   ? Plantar fascial fibromatosis   ? Moderate episode of recurrent major depressive disorder (H)   ? Ovulation pain   ? Lumbar strain   ? Depression   ? Cerebral palsy (H)   ? Borderline personality disorder (H)   ? Borderline intellectual disability   ? Anxiety   ? Trimalleolar fracture of ankle, closed, left, sequela   ? Anxiety and depression       Past Medical History:   Diagnosis Date   ? ADD (attention deficit disorder)    ? Anxiety    ? Borderline  mental retardation    ? Borderline personality disorder (H)    ? Cerebral palsy (H)    ? Depression    ? PONV (postoperative nausea and vomiting)    ? Uterine fibroid        Social History     Socioeconomic History   ? Marital status: Single     Spouse name: Not on file   ? Number of children: Not on file   ? Years of education: Not on file   ? Highest education level: Not on file   Occupational History   ? Not on file   Social Needs   ? Financial resource strain: Not on file   ? Food insecurity     Worry: Not on file     Inability: Not on file   ? Transportation needs     Medical: Not on file     Non-medical: Not on file   Tobacco Use   ? Smoking status: Current Some Day Smoker   ? Smokeless tobacco: Never Used   ? Tobacco comment: < 1 PPW   Substance and Sexual Activity   ? Alcohol use: Yes     Comment: rarely   ? Drug use: No   ? Sexual activity: Not on file   Lifestyle   ? Physical activity     Days per week: Not on file     Minutes per session: Not on file   ? Stress: Not on file   Relationships   ? Social connections     Talks on phone: Not on file     Gets together: Not on file     Attends Buddhist service: Not on file     Active member of club or organization: Not on file     Attends meetings of clubs or organizations: Not on file     Relationship status: Not on file   ? Intimate partner violence     Fear of current or ex partner: Not on file     Emotionally abused: Not on file     Physically abused: Not on file     Forced sexual activity: Not on file   Other Topics Concern   ? Not on file   Social History Narrative    Patient arrived alone to the ED 08/13/17       Past Surgical History:   Procedure Laterality Date   ? ADENOIDECTOMY     ? BUNIONECTOMY     ? EXTERNAL FIXATOR APPLICATION Left 5/18/2021    Procedure: CLOSED REDUCTION EXTERNAL FIXATION, LEFT ANKLE FRACTURE;  Surgeon: Dereck Nielson DO;  Location: [facility];  Service: Orthopedics   ? EYE MUSCLE SURGERY     ? FOOT CAPSULE  "RELEASE W/ PERCUTANEOUS HEEL CORD LENGTHENING, TIBIAL TENDON TRANSFER Right    ? LAPAROSCOPIC HYSTERECTOMY N/A 1/4/2019    Procedure: ROBOTIC TOTAL LAPAROSCOPIC HYSTERECTOMY, BILATERAL SALPINGECTOMY LYSIS OF ADHESIONS;  Surgeon: Jose Golden MD;  Location: [facility];  Service: Gynecology   ? TYMPANOPLASTY         History of Present Illness  Recent Health  Fever: no  Chills: no  Fatigue: no  Chest Pain: no  Cough: no  Dyspnea: no  Urinary Frequency: no  Nausea: no  Vomiting: no  Diarrhea: no  Abdominal Pain: no  Easy Bruising: no  Lower Extremity Swelling: no  Poor Exercise Tolerance: no    Most recent Health Maintenance Visit:  1 day(s) ago    Pertinent History  Prior Anesthesia: yes  Previous Anesthesia Reaction:  no  Diabetes: no  Cardiovascular Disease: no  Pulmonary Disease: no  Renal Disease: no  GI Disease: no  Sleep Apnea: no  Thromboembolic Problems: no  Clotting Disorder: no  Bleeding Disorder: no  Transfusion Reaction: no  Impaired Immunity: no  Steroid use in the last 6 months: no  Frequent Aspirin use: yes hold starting 5/27    Family history of none    Social history of there are no concerns regarding care after surgery    After surgery, the patient plans to recover at a rehabilitation center.    Review of Systems  Review of Systems     Patient denies fever, chills, headache, lightheadedness, dizziness, rhinorrhea, cough, congestion, shortness of breath, chest pain, palpitations, abdominal pain, n/v, diarrhea, constipation, change in appetite, dysuria, frequency, burning or pain with urination.  Other than stated in HPI all other review of systems is negative.           Objective:         Vitals:    05/26/21 0915   BP: 119/84   Pulse: (!) 103   Resp: 20   Temp: 99.1  F (37.3  C)   SpO2: 99%   Weight: 122 lb (55.3 kg)   Height: 5' 5\" (1.651 m)       Physical Exam:  Physical Exam   GENERAL APPEARANCE: Well developed, well nourished, in no acute distress.  HEENT: normocephalic, " atraumatic  PERRL, sclerae anicteric, conjunctivae clear and moist, EOM intact  LUNGS: Lung sounds CTA, no adventitious sounds, respiratory effort normal.  CARD: RRR, S1, S2, without murmurs, gallops, rubs,  ABD: Soft, none distended and nontender with normal bowel sounds.   MSK: Muscle strength and tone were equal bilaterally  EXTREMITIES: Left foot with good mobility of toes, good CMS and minimal edema.  Left foot in splint with external fixation device and toes only visible  NEURO: Alert and oriented x 3.  Intellectual impairment  Face is symmetric.  SKIN: Red raised candidal rash of bilateral groins  PSYCH: euthymic             Electronically signed by: Shasha Ocampo CNP

## 2021-07-05 ENCOUNTER — AMBULATORY - HEALTHEAST (OUTPATIENT)
Dept: GERIATRICS | Facility: CLINIC | Age: 31
End: 2021-07-05

## 2021-07-06 VITALS
HEIGHT: 62 IN | BODY MASS INDEX: 20.3 KG/M2 | BODY MASS INDEX: 23.41 KG/M2 | HEIGHT: 62 IN | WEIGHT: 128 LBS | BODY MASS INDEX: 22.31 KG/M2 | WEIGHT: 122 LBS | BODY MASS INDEX: 21.3 KG/M2

## 2021-07-08 ENCOUNTER — HOSPITAL ENCOUNTER (EMERGENCY)
Dept: EMERGENCY MEDICINE | Facility: CLINIC | Age: 31
Discharge: HOME | End: 2021-07-09
Attending: EMERGENCY MEDICINE
Payer: MEDICARE

## 2021-07-08 DIAGNOSIS — G89.18 POSTOPERATIVE PAIN: ICD-10-CM

## 2021-07-09 LAB
C REACTIVE PROTEIN LHE: 0.3 MG/DL (ref 0–0.8)
ERYTHROCYTE [DISTWIDTH] IN BLOOD BY AUTOMATED COUNT: 13.1 % (ref 11–14.5)
HCT VFR BLD AUTO: 40.5 % (ref 35–47)
HGB BLD-MCNC: 13.8 G/DL (ref 12–16)
MCH RBC QN AUTO: 30.5 PG (ref 27–34)
MCHC RBC AUTO-ENTMCNC: 34.1 G/DL (ref 32–36)
MCV RBC AUTO: 90 FL (ref 80–100)
PLATELET # BLD AUTO: 326 THOU/UL (ref 140–440)
PMV BLD AUTO: 10.6 FL (ref 8.5–12.5)
RBC # BLD AUTO: 4.52 MILL/UL (ref 3.8–5.4)
WBC: 7.6 THOU/UL (ref 4–11)

## 2021-07-09 NOTE — ED TRIAGE NOTES
"ED Triage Notes by Mari Kim RN at 7/8/2021 11:10 PM     Author: Mari Kim RN Service: -- Author Type: Registered Nurse    Filed: 7/8/2021 11:12 PM Date of Service: 7/8/2021 11:10 PM Status: Signed    : Mari Kim RN (Registered Nurse)       Pt presents with L leg swelling. Pt had ankle repair end of May, was recently placed in boot. Pt reports \"slight\" shortness of breath. Denies pain in L leg or chest pain. ABCs intact.        "

## 2021-07-09 NOTE — ED PROVIDER NOTES
ED Provider Notes by Zuleyka Lazar MD at 7/8/2021 11:37 PM     Author: Zuleyka Lazar MD Service: -- Author Type: Physician    Filed: 7/9/2021  1:23 AM Date of Service: 7/8/2021 11:37 PM Status: Signed    : Zuleyka Lazar MD (Physician)       EMERGENCY DEPARTMENT ENCOUNTER      NAME: Shannon Christopher  AGE: 31 y.o. female  YOB: 1990  MRN: [id number]  EVALUATION DATE & TIME: 7/8/2021 11:17 PM    PCP: Sveta Sandoval MD    ED PROVIDER: Zuleyka Lazar M.D.      Chief Complaint   Patient presents with   ? Leg Swelling         FINAL IMPRESSION:  1. Postoperative pain          ED COURSE & MEDICAL DECISION MAKING:    Pertinent Labs & Imaging studies reviewed. (See chart for details)  31 y.o. female presents to the Emergency Department for evaluation of left leg feeling red/more swollen.      11:40 PM I met with the patient, obtained history, performed an initial exam, and discussed options and plan for diagnostics and treatment here in the ED. PPE: Provider wore surgical mask.       On examination she has no physical exam findings to suggest wound infection or deeper infection of the left leg.  It is really fairly nontender to palpation.  There is no redness or swelling.  However based on what she is feeling that is new for her I do think doing some baseline inflammatory markers would be good.  It is only that leg so I am not concerned otherwise for something like renal failure heart failure causing leg swelling.  Then, we will do ultrasound to rule out DVT based on her symptoms.    Labs are unremarkable for any signs of inflammation.  Ultrasound shows no evidence of DVT.  The leg appears to be healing well.  No indication for further imaging today but will have her continue to be nonweightbearing and follow-up as an outpatient with [facility] within a few days for repeat check for her new symptoms.  Otherwise follow-up with her primary care physician tomorrow for any  ongoing feelings of warmth or redness she will discuss them with her tomorrow.    She will continue to be nonweightbearing.      At the conclusion of the encounter I discussed the results of all of the tests and the disposition. The questions were answered. The patient or family acknowledged understanding and was agreeable with the care plan.       0 minutes of critical care time not including time for procedures which is documented separately under procedures heading.    MEDICATIONS GIVEN IN THE EMERGENCY:  Medications - No data to display    NEW PRESCRIPTIONS STARTED AT TODAY'S ER VISIT  Current Discharge Medication List      CONTINUE these medications which have NOT CHANGED    Details   acetaminophen (TYLENOL) 500 MG tablet Take 1,000 mg by mouth 3 (three) times a day.      aspirin 81 MG EC tablet Take 1 tablet (81 mg total) by mouth 2 (two) times a day.  Qty:  , Refills: 0    Associated Diagnoses: Trimalleolar fracture of ankle, closed, left, sequela      divalproex (DEPAKOTE ER) 500 MG 24 hour tablet Take 500 mg by mouth at bedtime.       estradioL (ESTRACE) 2 MG tablet Take 2 mg by mouth at bedtime.       HYDROmorphone (DILAUDID) 2 MG tablet Take 2-4 mg by mouth every 4 (four) hours as needed for pain.      hydrOXYzine HCL (ATARAX) 25 MG tablet Take 1 tablet (25 mg total) by mouth every 6 (six) hours as needed for itching (with pain, moderate pain).  Qty: 30 tablet, Refills: 0    Associated Diagnoses: Trimalleolar fracture of ankle, closed, left, sequela      ketorolac (TORADOL) 10 mg tablet Take 10 mg by mouth every 6 (six) hours as needed for pain.      LATUDA 60 mg Tab tablet Take 60 mg by mouth at bedtime.       nystatin (MYCOSTATIN) cream Apply topically 3 (three) times a day as needed for dry skin.      OLANZapine (ZYPREXA) 7.5 MG tablet Take 7.5 mg by mouth at bedtime.       polyethylene glycol (MIRALAX) 17 gram packet Take 1 packet (17 g total) by mouth daily.  Qty: 7 packet, Refills: 0    Associated  Diagnoses: Trimalleolar fracture of ankle, closed, left, sequela      senna-docusate (PERICOLACE) 8.6-50 mg tablet Take 1-2 tablets by mouth 2 (two) times a day. Take while on oral narcotics to prevent or treat constipation.  Qty: 30 tablet, Refills: 0    Associated Diagnoses: Trimalleolar fracture of ankle, closed, left, sequela      zonisamide (ZONEGRAN) 100 MG capsule Take 400 mg by mouth at bedtime.                 =================================================================    HPI    Patient information was obtained from: patient    Use of : N/A      Shannon Christopher is a 31 y.o. female with a pertinent history of recent orif left ankle with our podiatry team who presents to this ED today by private vehicle with her mother for evaluation of left leg feeling warm/swollen.  She states that she has not had any systemic feelings of being ill or feverish.  The left foot around the ankle just feels a bit more warm today than it has recently and may be a little swollen inside.  She is curious if restarting doing icing and elevation would help and I did confirm that it would.  She has had no recent new injury to the area and has been wearing her boot, taking her medications as prescribed for her recovery, and keeping that weight off of the left leg at all.  She is due to have a follow-up with her surgeon in 2 weeks, but otherwise has a regular primary care visit tomorrow.  She has had no complications from recovery and has been home from [facility] now for 6 days.    She does seem to have a bit of a learning disability and so has some difficulty with knowing the names of her medications.  She states she follows the directions on the bottle, though, and only takes them as they are prescribed.  She does not take the hydromorphone anymore as she has not needed that for the pain for some time.      REVIEW OF SYSTEMS   Review of Systems   Otherwise, outside of that noted in the HPI, all other systems  negative.    PAST MEDICAL HISTORY:  Past Medical History:   Diagnosis Date   ? ADD (attention deficit disorder)    ? Anxiety    ? Borderline mental retardation    ? Borderline personality disorder (H)    ? Cerebral palsy (H)    ? Depression    ? PONV (postoperative nausea and vomiting)    ? Uterine fibroid        PAST SURGICAL HISTORY:  Past Surgical History:   Procedure Laterality Date   ? ADENOIDECTOMY     ? BUNIONECTOMY     ? EXTERNAL FIXATOR APPLICATION Left 5/18/2021    Procedure: CLOSED REDUCTION EXTERNAL FIXATION, LEFT ANKLE FRACTURE;  Surgeon: Dereck Nielson DO;  Location: [facility];  Service: Orthopedics   ? EYE MUSCLE SURGERY     ? FOOT CAPSULE RELEASE W/ PERCUTANEOUS HEEL CORD LENGTHENING, TIBIAL TENDON TRANSFER Right    ? HARDWARE REMOVAL Left 5/28/2021    Procedure: LEFT ANKLE EXTERNAL FIXATION REMOVAL;  Surgeon: Dereck Costa DO;  Location: [facility];  Service: Orthopedics   ? LAPAROSCOPIC HYSTERECTOMY N/A 1/4/2019    Procedure: ROBOTIC TOTAL LAPAROSCOPIC HYSTERECTOMY, BILATERAL SALPINGECTOMY LYSIS OF ADHESIONS;  Surgeon: Jose Golden MD;  Location: [facility];  Service: Gynecology   ? ORIF ANKLE FRACTURE Left 5/28/2021    Procedure: WITH OPEN REDUCTION INTERNAL FIXATION, TRIMALLEOLAR, LEFT ANKLE;  Surgeon: Dereck Costa DO;  Location: [facility];  Service: Orthopedics   ? TYMPANOPLASTY             CURRENT MEDICATIONS:    No current facility-administered medications on file prior to encounter.      Current Outpatient Medications on File Prior to Encounter   Medication Sig   ? acetaminophen (TYLENOL) 500 MG tablet Take 1,000 mg by mouth 3 (three) times a day.   ? aspirin 81 MG EC tablet Take 1 tablet (81 mg total) by mouth 2 (two) times a day.   ? divalproex (DEPAKOTE ER) 500 MG 24 hour tablet Take 500 mg by mouth at bedtime.    ? estradioL (ESTRACE) 2 MG tablet Take 2 mg by mouth at bedtime.    ? HYDROmorphone (DILAUDID) 2 MG tablet Take 2-4  mg by mouth every 4 (four) hours as needed for pain.   ? hydrOXYzine HCL (ATARAX) 25 MG tablet Take 1 tablet (25 mg total) by mouth every 6 (six) hours as needed for itching (with pain, moderate pain).   ? ketorolac (TORADOL) 10 mg tablet Take 10 mg by mouth every 6 (six) hours as needed for pain.   ? LATUDA 60 mg Tab tablet Take 60 mg by mouth at bedtime.    ? nystatin (MYCOSTATIN) cream Apply topically 3 (three) times a day as needed for dry skin.   ? OLANZapine (ZYPREXA) 7.5 MG tablet Take 7.5 mg by mouth at bedtime.    ? polyethylene glycol (MIRALAX) 17 gram packet Take 1 packet (17 g total) by mouth daily.   ? senna-docusate (PERICOLACE) 8.6-50 mg tablet Take 1-2 tablets by mouth 2 (two) times a day. Take while on oral narcotics to prevent or treat constipation.   ? zonisamide (ZONEGRAN) 100 MG capsule Take 400 mg by mouth at bedtime.        ALLERGIES:  No Known Allergies    FAMILY HISTORY:  No family history on file.    SOCIAL HISTORY:   Social History     Socioeconomic History   ? Marital status: Single     Spouse name: Not on file   ? Number of children: Not on file   ? Years of education: Not on file   ? Highest education level: Not on file   Occupational History   ? Not on file   Social Needs   ? Financial resource strain: Not on file   ? Food insecurity     Worry: Not on file     Inability: Not on file   ? Transportation needs     Medical: Not on file     Non-medical: Not on file   Tobacco Use   ? Smoking status: Current Some Day Smoker   ? Smokeless tobacco: Never Used   ? Tobacco comment: < 1 PPW   Substance and Sexual Activity   ? Alcohol use: Yes     Comment: rarely   ? Drug use: No   ? Sexual activity: Not on file   Lifestyle   ? Physical activity     Days per week: Not on file     Minutes per session: Not on file   ? Stress: Not on file   Relationships   ? Social connections     Talks on phone: Not on file     Gets together: Not on file     Attends Nondenominational service: Not on file     Active member of  club or organization: Not on file     Attends meetings of clubs or organizations: Not on file     Relationship status: Not on file   ? Intimate partner violence     Fear of current or ex partner: Not on file     Emotionally abused: Not on file     Physically abused: Not on file     Forced sexual activity: Not on file   Other Topics Concern   ? Not on file   Social History Narrative    Patient arrived alone to the ED 08/13/17       VITALS:  Patient Vitals for the past 24 hrs:   BP Temp Temp src Pulse Resp SpO2 Weight   07/08/21 2354 126/77 -- -- 81 -- 100 % --   07/08/21 2312 119/72 97.1  F (36.2  C) Temporal 88 16 98 % 122 lb (55.3 kg)       PHYSICAL EXAM    Constitutional:  Well nourished, NAD.  HENT:  Normocephalic, posterior pharynx wnl, mucous membranes moist and dark pink   Eyes:  PERRL, EOMI, Conjunctiva normal, No discharge, no scleral icterus.  Respiratory:  Breathing easily, cta  Cardiovascular:  rrr nl s1s2 0 murmurs, rubs, or gallops.  Peripheral pulses dp, pt, and radial are wnl.  no peripheral edema   GI:  Bowel sounds normal, Soft, No tenderness, No flank tenderness, nondistended.  :No CVA tenderness.   Musculoskeletal:  Moves all extremities.  No erythema at the left ankle.  Limited range of motion obviously due to recent surgery.  Integument:  Warm, Dry, No erythema, No rash.  Her left lower extremity has postsurgical wounds on the shin and on the ankle.  No dehiscence of that wounds, no surrounding erythema, they appear to be healing well.  There is some residual underlying increased size of the left ankle consistent with recent surgery but no real new edema appearance redness or increased warmth.  Lymphatic:  No cervical lymphadenopathy  Neurologic:  Alert & oriented x 3, Normal motor function, Normal sensory function, No focal deficits noted. Normal speech.  Psychiatric:  Affect normal, Judgment normal, Mood normal.      LAB:  All pertinent labs reviewed and interpreted.  Results for orders  placed or performed during the hospital encounter of 07/08/21   C-Reactive Protein   Result Value Ref Range    CRP 0.3 0.0 - 0.8 mg/dL   HM2 (CBC W/O DIFF)   Result Value Ref Range    WBC 7.6 4.0 - 11.0 thou/uL    RBC 4.52 3.80 - 5.40 mill/uL    Hemoglobin 13.8 12.0 - 16.0 g/dL    Hematocrit 40.5 35.0 - 47.0 %    MCV 90 80 - 100 fL    MCH 30.5 27.0 - 34.0 pg    MCHC 34.1 32.0 - 36.0 g/dL    RDW 13.1 11.0 - 14.5 %    Platelets 326 140 - 440 thou/uL    MPV 10.6 8.5 - 12.5 fL       RADIOLOGY:  Reviewed all pertinent imaging. Please see official radiology report.  Us Venous Leg Left    Result Date: 7/9/2021  EXAM: US VENOUS LEG LEFT LOCATION: [facility] DATE/TIME: 7/9/2021 1:13 AM INDICATION: swelling post op 5/28 ankle fracture surgery COMPARISON: None. TECHNIQUE: Venous Duplex ultrasound of the left lower extremity with and without compression, augmentation and duplex. Color flow and spectral Doppler with waveform analysis performed. FINDINGS: Exam includes the common femoral, femoral, popliteal, and contralateral common femoral veins as well as segmentally visualized deep calf veins and greater saphenous vein. LEFT: No deep vein thrombosis. No superficial thrombophlebitis. No popliteal cyst.     1.  No deep venous thrombosis in the left lower extremity.        Zuleyka Lazar M.D.  Emergency Medicine  [facility] EMERGENCY ROOM  [address] [place] [id number]  Dept: [phone]  Loc: [phone]     Zuleyka Lazar MD  07/09/21 0123

## 2021-07-10 VITALS — BODY MASS INDEX: 20.3 KG/M2 | WEIGHT: 122 LBS

## 2021-11-07 ENCOUNTER — APPOINTMENT (OUTPATIENT)
Dept: ULTRASOUND IMAGING | Facility: CLINIC | Age: 31
End: 2021-11-07
Payer: MEDICARE

## 2021-11-07 ENCOUNTER — HOSPITAL ENCOUNTER (EMERGENCY)
Facility: CLINIC | Age: 31
Discharge: HOME | End: 2021-11-08
Admitting: EMERGENCY MEDICINE
Payer: MEDICARE

## 2021-11-07 DIAGNOSIS — N76.0 BACTERIAL VAGINOSIS: ICD-10-CM

## 2021-11-07 DIAGNOSIS — B96.89 BACTERIAL VAGINOSIS: ICD-10-CM

## 2021-11-07 PROBLEM — F31.9 BIPOLAR 1 DISORDER (H): Status: ACTIVE | Noted: 2020-10-21

## 2021-11-07 LAB
ALBUMIN UR-MCNC: NEGATIVE MG/DL
AMORPH CRY #/AREA URNS HPF: ABNORMAL /HPF
ANION GAP SERPL CALCULATED.3IONS-SCNC: 14 MMOL/L (ref 5–18)
APPEARANCE UR: ABNORMAL
BASOPHILS # BLD AUTO: 0 10E3/UL (ref 0–0.2)
BASOPHILS NFR BLD AUTO: 0 %
BILIRUB UR QL STRIP: NEGATIVE
BUN SERPL-MCNC: 19 MG/DL (ref 8–22)
CALCIUM SERPL-MCNC: 9.4 MG/DL (ref 8.5–10.5)
CHLORIDE BLD-SCNC: 110 MMOL/L (ref 98–107)
CLUE CELLS: PRESENT
CO2 SERPL-SCNC: 16 MMOL/L (ref 22–31)
COLOR UR AUTO: YELLOW
CREAT SERPL-MCNC: 0.72 MG/DL (ref 0.6–1.1)
EOSINOPHIL # BLD AUTO: 0.1 10E3/UL (ref 0–0.7)
EOSINOPHIL NFR BLD AUTO: 1 %
ERYTHROCYTE [DISTWIDTH] IN BLOOD BY AUTOMATED COUNT: 12.1 % (ref 10–15)
GFR SERPL CREATININE-BSD FRML MDRD: >90 ML/MIN/1.73M2
GLUCOSE BLD-MCNC: 95 MG/DL (ref 70–125)
GLUCOSE UR STRIP-MCNC: NEGATIVE MG/DL
HCG UR QL: NEGATIVE
HCT VFR BLD AUTO: 41.1 % (ref 35–47)
HGB BLD-MCNC: 13.6 G/DL (ref 11.7–15.7)
HGB UR QL STRIP: ABNORMAL
IMM GRANULOCYTES # BLD: 0 10E3/UL
IMM GRANULOCYTES NFR BLD: 0 %
KETONES UR STRIP-MCNC: 40 MG/DL
LEUKOCYTE ESTERASE UR QL STRIP: NEGATIVE
LYMPHOCYTES # BLD AUTO: 3.8 10E3/UL (ref 0.8–5.3)
LYMPHOCYTES NFR BLD AUTO: 39 %
MCH RBC QN AUTO: 29.5 PG (ref 26.5–33)
MCHC RBC AUTO-ENTMCNC: 33.1 G/DL (ref 31.5–36.5)
MCV RBC AUTO: 89 FL (ref 78–100)
MONOCYTES # BLD AUTO: 0.7 10E3/UL (ref 0–1.3)
MONOCYTES NFR BLD AUTO: 7 %
MUCOUS THREADS #/AREA URNS LPF: PRESENT /LPF
NEUTROPHILS # BLD AUTO: 5.1 10E3/UL (ref 1.6–8.3)
NEUTROPHILS NFR BLD AUTO: 53 %
NITRATE UR QL: NEGATIVE
NRBC # BLD AUTO: 0 10E3/UL
NRBC BLD AUTO-RTO: 0 /100
PH UR STRIP: 6 [PH] (ref 5–7)
PLATELET # BLD AUTO: 271 10E3/UL (ref 150–450)
POTASSIUM BLD-SCNC: 3.6 MMOL/L (ref 3.5–5)
RBC # BLD AUTO: 4.61 10E6/UL (ref 3.8–5.2)
RBC URINE: 1 /HPF
SODIUM SERPL-SCNC: 140 MMOL/L (ref 136–145)
SP GR UR STRIP: 1.02 (ref 1–1.03)
SQUAMOUS EPITHELIAL: 1 /HPF
TRICHOMONAS, WET PREP: ABNORMAL
UROBILINOGEN UR STRIP-MCNC: <2 MG/DL
WBC # BLD AUTO: 9.8 10E3/UL (ref 4–11)
WBC URINE: <1 /HPF
WBC'S/HIGH POWER FIELD, WET PREP: ABNORMAL
YEAST, WET PREP: ABNORMAL

## 2021-11-07 PROCEDURE — 99285 EMERGENCY DEPT VISIT HI MDM: CPT | Mod: 25

## 2021-11-07 PROCEDURE — 96372 THER/PROPH/DIAG INJ SC/IM: CPT | Performed by: PHYSICIAN ASSISTANT

## 2021-11-07 PROCEDURE — 36415 COLL VENOUS BLD VENIPUNCTURE: CPT | Performed by: PHYSICIAN ASSISTANT

## 2021-11-07 PROCEDURE — 87491 CHLMYD TRACH DNA AMP PROBE: CPT | Performed by: PHYSICIAN ASSISTANT

## 2021-11-07 PROCEDURE — 81001 URINALYSIS AUTO W/SCOPE: CPT | Performed by: PHYSICIAN ASSISTANT

## 2021-11-07 PROCEDURE — 81025 URINE PREGNANCY TEST: CPT | Performed by: EMERGENCY MEDICINE

## 2021-11-07 PROCEDURE — 87210 SMEAR WET MOUNT SALINE/INK: CPT | Performed by: PHYSICIAN ASSISTANT

## 2021-11-07 PROCEDURE — 85025 COMPLETE CBC W/AUTO DIFF WBC: CPT | Performed by: PHYSICIAN ASSISTANT

## 2021-11-07 PROCEDURE — 80048 BASIC METABOLIC PNL TOTAL CA: CPT | Performed by: PHYSICIAN ASSISTANT

## 2021-11-07 PROCEDURE — 250N000011 HC RX IP 250 OP 636: Performed by: PHYSICIAN ASSISTANT

## 2021-11-07 PROCEDURE — 250N000013 HC RX MED GY IP 250 OP 250 PS 637: Performed by: PHYSICIAN ASSISTANT

## 2021-11-07 PROCEDURE — 76830 TRANSVAGINAL US NON-OB: CPT

## 2021-11-07 RX ORDER — KETOROLAC TROMETHAMINE 30 MG/ML
30 INJECTION, SOLUTION INTRAMUSCULAR; INTRAVENOUS ONCE
Status: COMPLETED | OUTPATIENT
Start: 2021-11-07 | End: 2021-11-07

## 2021-11-07 RX ORDER — ACETAMINOPHEN 325 MG/1
975 TABLET ORAL ONCE
Status: COMPLETED | OUTPATIENT
Start: 2021-11-07 | End: 2021-11-07

## 2021-11-07 RX ADMIN — ACETAMINOPHEN 975 MG: 325 TABLET ORAL at 22:20

## 2021-11-07 RX ADMIN — KETOROLAC TROMETHAMINE 30 MG: 30 INJECTION, SOLUTION INTRAMUSCULAR at 22:21

## 2021-11-07 RX ADMIN — HYDROMORPHONE HYDROCHLORIDE 1 MG: 2 TABLET ORAL at 23:45

## 2021-11-07 ASSESSMENT — ENCOUNTER SYMPTOMS
ABDOMINAL PAIN: 1
CHILLS: 0
VOMITING: 0
DYSURIA: 0
SORE THROAT: 0
COUGH: 0
SHORTNESS OF BREATH: 0
DIARRHEA: 0
FEVER: 0
NAUSEA: 0
HEMATURIA: 0
NERVOUS/ANXIOUS: 1

## 2021-11-08 VITALS
SYSTOLIC BLOOD PRESSURE: 130 MMHG | HEART RATE: 72 BPM | TEMPERATURE: 98.4 F | WEIGHT: 122 LBS | BODY MASS INDEX: 20.3 KG/M2 | RESPIRATION RATE: 16 BRPM | OXYGEN SATURATION: 99 % | DIASTOLIC BLOOD PRESSURE: 64 MMHG

## 2021-11-08 RX ORDER — METRONIDAZOLE 500 MG/1
500 TABLET ORAL 2 TIMES DAILY
Qty: 14 TABLET | Refills: 1 | Status: SHIPPED | OUTPATIENT
Start: 2021-11-08 | End: 2021-11-15

## 2021-11-08 NOTE — ED PROVIDER NOTES
Emergency Department Encounter   NAME: Shannon Christopher ; AGE: 31 year old female ; YOB: 1990 ; MRN: [id number] ; EVALUATION DATE & TIME: 11/7/2021  9:20 PM ; PCP: Sveta Sandoval   ED PROVIDER: Roxane Sharp PA-C    Chief Complaint   Patient presents with     Abdominal Pain       Medical Decision Making & Final Diagnosis     1. Bacterial vaginosis         ED Course as of 11/08/21 0055   Sun Nov 07, 2021   2323 Shannon is a 30 yo F w PMH of borderline personality disorder, cerebral palsy, intellectual disability, s/p hysterectomy and bilateral salpingectomy who presents the ED for evaluation of pelvic pain.  She states is been intermittent for years.  Notes increased vaginal discharge.    My exam is notable for normal vital signs and a nontoxic appearing woman.  No CVA tenderness.  Mild abdominal TTP in left lower quadrant.  Pelvic exam reveals normal appearing external genitalia without rash.  Vaginal vault with a moderate amount of thick white discharge.  Vaginal mucosa without abnormality.  Vaginal cuff intact.  Mild left-sided adnexal TTP on bimanual exam.   Mon Nov 08, 2021   0051 Consider broad differential for this patient's presentation including STI or vaginitis, vaginal trauma, ovarian torsion or ruptured ovarian cysts, UTI, appendicitis, complicated diverticulitis, muscle strain, herpes zoster cellulitis, ureterolithiasis, hernia, and others   0052 Work appears very reassuring.  No leukocytosis or severe anemia.  CO2 16 but patient is asymptomatic with this.  Wet prep notable for clue cells.  GCC pending but patient denied any concern for STI.  Pregnancy test negative.  UA with a small amount of blood, ketonuria, and crystals.  Micro negative.  Pelvic ultrasound without any evidence of ovarian torsion or ovarian cyst and no free fluid in her abdomen.  Low suspicion of PID in this patient without a cervix.  She does have evidence of BV and vaginitis on my exam which we will treat her for.   She had no palpable abdominal mass concerning for hernia.  No focal severe tenderness or peritoneal findings on exam to make me suspicious for appendicitis or other abdominal catastrophe that require further imaging with CT.  She had no CVA tenderness in the way she describes her pain does not sound consistent with ureterolithiasis.  UA without evidence of infection.  No skin changes for herpes zoster cellulitis.  Lower suspicion of muscle sprain given patient's history.  This is been chronic pain that she has been dealing with for a few years.  She does have follow-up appointment with her OB on the 22nd to discuss further.  She was given Tylenol, Toradol, and a small dose of Dilaudid here with some improvement in pain.  I do think she is appropriate for outpatient management.  We reviewed strict return precautions for further evaluation the emergency department.  She was given written and verbal discharge instructions.            ED Course   9:25 PM I met and introduced myself to the patient. I gathered initial history and performed my physical exam. We discussed plan for initial workup.   9:54 PM I performed a pelvic exam.   12:06 AM I rechecked and updated the patient. I discussed the plan for discharge with the patient, and patient is agreeable. We discussed supportive cares at home and reasons for return to the ER including new or worsening symptoms - all questions and concerns addressed. Patient to be discharged by RN. The patient has scheduled a follow up appointment with her OB on 11/22.   PPE: Provider wore gloves, N95 mask, eye protection, surgical cap, and paper mask.   MEDICATIONS GIVEN IN THE EMERGENCY:   Medications   ketorolac (TORADOL) injection 30 mg (30 mg Intramuscular Given 11/7/21 2221)   acetaminophen (TYLENOL) tablet 975 mg (975 mg Oral Given 11/7/21 2220)   HYDROmorphone (DILAUDID) half-tab 1 mg (1 mg Oral Given 11/7/21 2345)      NEW PRESCRIPTIONS STARTED AT TODAY'S ER VISIT:  Discharge  "Medication List as of 11/8/2021 12:30 AM      START taking these medications    Details   metroNIDAZOLE (FLAGYL) 500 MG tablet Take 1 tablet (500 mg) by mouth 2 times daily for 7 days, Disp-14 tablet, R-1, Local PrintEat yogurt or cottage cheese daily to prevent diarrhea that can be caused by taking this medication.           =================================================================   History   Patient information was obtained from: the patient    Use of Intrepreter: N/A   Shannon Christopher is a 31 year old female with a relevant PMH of uterine fibroid, cerebral palsy, anxiety, laparoscopic hysterectomy (2019), and bipolar 1 disorder, who presents to the ED for evaluation of abdominal pain.   The patient reports intermittent bilateral lower abdominal pain for the last few months. Her current episode began this evening, and she states that she is \"done with the pain.\" She rates the pain as a 4/10 currently. She has not taken anything at home for pain tonight stating that \"nothing works,\" she requests Dilaudid. The patient endorses white vaginal \"leakage\" which has increased recently. She states that she is sexually active with one partner and they use condoms, but she believes that she has HPV. The patient denies any chance of pregnancy. The patient denies dysuria, hematuria, diarrhea, nausea, vomiting, fever, chills, sore throat, chest pain, shortness of breath, burning or itching around the vagina, rash, pain with intercourse, and any other symptoms or complaints at this time.     ______________________________________________________________________  Past Medical History:   Diagnosis Date     ADD (attention deficit disorder)      Anxiety      Borderline mental retardation      Borderline personality disorder (H)      Cerebral palsy (H)      Depression      PONV (postoperative nausea and vomiting)      Uterine fibroid         Past Surgical History:   Procedure Laterality Date     ADENOIDECTOMY       APPLY " EXTERNAL FIXATOR LOWER EXTREMITY Left 5/18/2021    Procedure: CLOSED REDUCTION EXTERNAL FIXATION, LEFT ANKLE FRACTURE;  Surgeon: Dereck Nielson DO;  Location: [facility];  Service: Orthopedics     BUNIONECTOMY       EYE MUSCLE SURGERY       FOOT CAPSULE RELEASE W/ PERCUTANEOUS HEEL CORD LENGTHENING, TIBIAL TENDON TRANSFER Right      LAPAROSCOPIC HYSTERECTOMY N/A 1/4/2019    Procedure: ROBOTIC TOTAL LAPAROSCOPIC HYSTERECTOMY, BILATERAL SALPINGECTOMY LYSIS OF ADHESIONS;  Surgeon: Jose Golden MD;  Location: [facility] OR;  Service: Gynecology     OPEN REDUCTION INTERNAL FIXATION ANKLE Left 5/28/2021    Procedure: WITH OPEN REDUCTION INTERNAL FIXATION, TRIMALLEOLAR, LEFT ANKLE;  Surgeon: Dereck Costa DO;  Location: [facility] OR;  Service: Orthopedics     REMOVE HARDWARE LOWER EXTREMITY Left 5/28/2021    Procedure: LEFT ANKLE EXTERNAL FIXATION REMOVAL;  Surgeon: Dereck Costa DO;  Location: [facility] OR;  Service: Orthopedics     TYMPANOPLASTY         No family history on file.    Social History     Tobacco Use     Smoking status: Current Some Day Smoker     Smokeless tobacco: Never Used     Tobacco comment: < 1 PPW   Substance Use Topics     Alcohol use: Yes     Comment: Alcoholic Drinks/day: rarely     Drug use: No       REVIEW OF SYSTEMS:    Review of Systems   Constitutional: Negative for chills and fever.   HENT: Negative for sore throat.    Respiratory: Negative for cough and shortness of breath.    Cardiovascular: Negative for chest pain.   Gastrointestinal: Positive for abdominal pain. Negative for diarrhea, nausea and vomiting.   Genitourinary: Positive for vaginal discharge. Negative for dysuria, hematuria and vaginal pain.   Skin: Negative for rash.        Negative for burning or itching around vagina.    Psychiatric/Behavioral: The patient is nervous/anxious.    All other systems reviewed and are negative.        Physical Exam   /64   Pulse 72    Temp 98.4  F (36.9  C) (Oral)   Resp 16   Wt 55.3 kg (122 lb)   SpO2 99%   BMI 20.30 kg/m      Physical Exam  Constitutional:       General: She is not in acute distress.     Appearance: Normal appearance. She is not toxic-appearing.   HENT:      Head: Normocephalic.   Eyes:      Conjunctiva/sclera: Conjunctivae normal.   Cardiovascular:      Rate and Rhythm: Normal rate and regular rhythm.      Heart sounds: Normal heart sounds.   Pulmonary:      Effort: Pulmonary effort is normal. No respiratory distress.      Breath sounds: Normal breath sounds. No wheezing, rhonchi or rales.   Abdominal:      General: There is no distension.      Palpations: Abdomen is soft.      Tenderness: There is abdominal tenderness (mild) in the left lower quadrant. There is no guarding or rebound.   Genitourinary:     General: Normal vulva.      Pubic Area: No rash.       Labia:         Right: No rash.         Left: No rash.       Vagina: Vaginal discharge (moderate amount of thick white discharge) present.      Adnexa:         Left: Tenderness (mild) present.       Comments: Vaginal mucosa without abnormality. Vaginal cuff intact.   Musculoskeletal:         General: No swelling or deformity. Normal range of motion.      Cervical back: Normal range of motion.      Right lower leg: No edema.      Left lower leg: No edema.      Comments: No CVA tenderness.    Skin:     General: Skin is warm.   Neurological:      General: No focal deficit present.      Mental Status: She is alert.   Psychiatric:         Mood and Affect: Mood normal.         Lab Work (Reviewed and Interpreted):   Labs Ordered and Resulted from Time of ED Arrival to Time of ED Departure   BASIC METABOLIC PANEL - Abnormal       Result Value    Sodium 140      Potassium 3.6      Chloride 110 (*)     Carbon Dioxide (CO2) 16 (*)     Anion Gap 14      Urea Nitrogen 19      Creatinine 0.72      Calcium 9.4      Glucose 95      GFR Estimate >90     ROUTINE UA WITH MICROSCOPIC  REFLEX TO CULTURE - Abnormal    Color Urine Yellow      Appearance Urine Turbid (*)     Glucose Urine Negative      Bilirubin Urine Negative      Ketones Urine 40  (*)     Specific Gravity Urine 1.023      Blood Urine 0.03 mg/dL (*)     pH Urine 6.0      Protein Albumin Urine Negative      Urobilinogen Urine <2.0      Nitrite Urine Negative      Leukocyte Esterase Urine Negative      Mucus Urine Present (*)     Amorphous Crystals Urine Few (*)     RBC Urine 1      WBC Urine <1      Squamous Epithelials Urine 1     WET PREPARATION - Abnormal    Trichomonas Absent      Yeast Absent      Clue Cells Present (*)     WBCs/high power field 1+ (*)    HCG QUALITATIVE URINE - Normal    hCG Urine Qualitative Negative     CBC WITH PLATELETS AND DIFFERENTIAL    WBC Count 9.8      RBC Count 4.61      Hemoglobin 13.6      Hematocrit 41.1      MCV 89      MCH 29.5      MCHC 33.1      RDW 12.1      Platelet Count 271      % Neutrophils 53      % Lymphocytes 39      % Monocytes 7      % Eosinophils 1      % Basophils 0      % Immature Granulocytes 0      NRBCs per 100 WBC 0      Absolute Neutrophils 5.1      Absolute Lymphocytes 3.8      Absolute Monocytes 0.7      Absolute Eosinophils 0.1      Absolute Basophils 0.0      Absolute Immature Granulocytes 0.0      Absolute NRBCs 0.0     HCG QUALITATIVE URINE   HCG QUALITATIVE URINE POCT   CHLAMYDIA TRACHOMATIS/NEISSERIA GONORRHOEAE BY PCR       Imaging (Reviewed and Interpreted):   US Pelvis Cmplt w Transvag & Doppler LmtPel Duplex Limited   Final Result   IMPRESSION:     1.  Uterus surgically absent.      2.  Both ovaries unremarkable. Flow present on waveform analysis.                   EKG (Reviewed and Interpreted):   None.     PROCEDURES:   None.      I, Aislinn Kemp, am serving as a scribe to document services personally performed by Roxane Sharp PA-C, based on my observation and the provider's statements to me. I, Roxane Sharp PA-C attest that Aislinn Kemp is acting in a  viki akbar, has observed my performance of the services and has documented them in accordance with my direction.     Roxane Sharp PA-C   Emergency Medicine   [facility] EMERGENCY ROOM  [address] [address]  [phone]  Dept: [phone]       Roxane Sharp PA-C  11/08/21 0056

## 2021-11-08 NOTE — DISCHARGE INSTRUCTIONS
You were seen in the emergency department for pelvic pain. At this time, your blood work and imaging are very reassuring for no life threatening or emergent source of your symptoms.    Take care of yourself at home.    YOU DO HAVE A BACTERIAL VAGINOSIS INFECTION. Take metronidazole 2 times a day for 7 days for this. If you drink alcohol while on this medication you will become very ill.     For pain or fever you may take:  - Ibuprofen 600 mg every 6 hours. Max 3200 mg in 24 hours  - Please take this medication with food as it can cause an upset stomach  - Please do not take this medication if you have a history of a GI bleed or kidney problems  - Tylenol 650 mg every 6 hours. Max 4000 mg in 24 hours.   - Please do not take this medication with alcohol as it can cause problems with your liver.  - You can take both of these medications at the same time or alternate them every 3 hours. For example, tylenol at 6a, ibuprofen at 9a, tylenol at 12p, ibuprofen at 3p, etc.     Please follow up with your OB and primary care provider to ensure your symptoms are improving.    Return to the emergency department if:  - You develop a fever (100.4F or above)  - Your symptoms worsen  - start vomiting  - Or with any other concerning symptom

## 2021-11-08 NOTE — ED TRIAGE NOTES
Patient is here with lower abdomen pain that started months ago and continues to worsen. She does have heavy leakage- white. No issues with bowel or bladder.

## 2021-11-09 LAB
C TRACH DNA SPEC QL PROBE+SIG AMP: NEGATIVE
N GONORRHOEA DNA SPEC QL NAA+PROBE: NEGATIVE

## 2021-11-20 ENCOUNTER — HOSPITAL ENCOUNTER (EMERGENCY)
Facility: CLINIC | Age: 31
Discharge: HOME | End: 2021-11-20
Attending: EMERGENCY MEDICINE | Admitting: EMERGENCY MEDICINE
Payer: MEDICARE

## 2021-11-20 ENCOUNTER — APPOINTMENT (OUTPATIENT)
Dept: CT IMAGING | Facility: CLINIC | Age: 31
End: 2021-11-20
Attending: EMERGENCY MEDICINE
Payer: MEDICARE

## 2021-11-20 VITALS
DIASTOLIC BLOOD PRESSURE: 81 MMHG | BODY MASS INDEX: 22.45 KG/M2 | HEART RATE: 59 BPM | HEIGHT: 62 IN | TEMPERATURE: 97.6 F | RESPIRATION RATE: 16 BRPM | OXYGEN SATURATION: 100 % | WEIGHT: 122 LBS | SYSTOLIC BLOOD PRESSURE: 125 MMHG

## 2021-11-20 DIAGNOSIS — N20.1 URETEROLITHIASIS: ICD-10-CM

## 2021-11-20 LAB
ALBUMIN SERPL-MCNC: 4.6 G/DL (ref 3.5–5)
ALBUMIN UR-MCNC: 30 MG/DL
ALP SERPL-CCNC: 64 U/L (ref 45–120)
ALT SERPL W P-5'-P-CCNC: 22 U/L (ref 0–45)
ANION GAP SERPL CALCULATED.3IONS-SCNC: 12 MMOL/L (ref 5–18)
APPEARANCE UR: ABNORMAL
AST SERPL W P-5'-P-CCNC: 21 U/L (ref 0–40)
BASOPHILS # BLD AUTO: 0 10E3/UL (ref 0–0.2)
BASOPHILS NFR BLD AUTO: 0 %
BILIRUB SERPL-MCNC: 0.3 MG/DL (ref 0–1)
BILIRUB UR QL STRIP: NEGATIVE
BUN SERPL-MCNC: 14 MG/DL (ref 8–22)
CALCIUM SERPL-MCNC: 9.6 MG/DL (ref 8.5–10.5)
CHLORIDE BLD-SCNC: 107 MMOL/L (ref 98–107)
CO2 SERPL-SCNC: 19 MMOL/L (ref 22–31)
COLOR UR AUTO: YELLOW
CREAT SERPL-MCNC: 0.89 MG/DL (ref 0.6–1.1)
EOSINOPHIL # BLD AUTO: 0 10E3/UL (ref 0–0.7)
EOSINOPHIL NFR BLD AUTO: 0 %
ERYTHROCYTE [DISTWIDTH] IN BLOOD BY AUTOMATED COUNT: 12.3 % (ref 10–15)
GFR SERPL CREATININE-BSD FRML MDRD: 85 ML/MIN/1.73M2
GLUCOSE BLD-MCNC: 138 MG/DL (ref 70–125)
GLUCOSE UR STRIP-MCNC: NEGATIVE MG/DL
HCT VFR BLD AUTO: 43.8 % (ref 35–47)
HGB BLD-MCNC: 14.6 G/DL (ref 11.7–15.7)
HGB UR QL STRIP: ABNORMAL
HYALINE CASTS: 1 /LPF
IMM GRANULOCYTES # BLD: 0.1 10E3/UL
IMM GRANULOCYTES NFR BLD: 1 %
KETONES UR STRIP-MCNC: 20 MG/DL
LEUKOCYTE ESTERASE UR QL STRIP: NEGATIVE
LIPASE SERPL-CCNC: 28 U/L (ref 0–52)
LYMPHOCYTES # BLD AUTO: 1.5 10E3/UL (ref 0.8–5.3)
LYMPHOCYTES NFR BLD AUTO: 11 %
MCH RBC QN AUTO: 29.7 PG (ref 26.5–33)
MCHC RBC AUTO-ENTMCNC: 33.3 G/DL (ref 31.5–36.5)
MCV RBC AUTO: 89 FL (ref 78–100)
MONOCYTES # BLD AUTO: 0.6 10E3/UL (ref 0–1.3)
MONOCYTES NFR BLD AUTO: 4 %
MUCOUS THREADS #/AREA URNS LPF: PRESENT /LPF
NEUTROPHILS # BLD AUTO: 11.1 10E3/UL (ref 1.6–8.3)
NEUTROPHILS NFR BLD AUTO: 84 %
NITRATE UR QL: NEGATIVE
NRBC # BLD AUTO: 0 10E3/UL
NRBC BLD AUTO-RTO: 0 /100
PH UR STRIP: 6.5 [PH] (ref 5–7)
PLATELET # BLD AUTO: 279 10E3/UL (ref 150–450)
POTASSIUM BLD-SCNC: 4 MMOL/L (ref 3.5–5)
PROT SERPL-MCNC: 7.9 G/DL (ref 6–8)
RBC # BLD AUTO: 4.92 10E6/UL (ref 3.8–5.2)
RBC URINE: 64 /HPF
SODIUM SERPL-SCNC: 138 MMOL/L (ref 136–145)
SP GR UR STRIP: 1.02 (ref 1–1.03)
SQUAMOUS EPITHELIAL: 3 /HPF
UROBILINOGEN UR STRIP-MCNC: <2 MG/DL
WBC # BLD AUTO: 13.2 10E3/UL (ref 4–11)
WBC URINE: 4 /HPF

## 2021-11-20 PROCEDURE — 250N000011 HC RX IP 250 OP 636: Performed by: EMERGENCY MEDICINE

## 2021-11-20 PROCEDURE — 83690 ASSAY OF LIPASE: CPT | Performed by: EMERGENCY MEDICINE

## 2021-11-20 PROCEDURE — 99285 EMERGENCY DEPT VISIT HI MDM: CPT | Mod: 25

## 2021-11-20 PROCEDURE — 81001 URINALYSIS AUTO W/SCOPE: CPT | Performed by: EMERGENCY MEDICINE

## 2021-11-20 PROCEDURE — 36415 COLL VENOUS BLD VENIPUNCTURE: CPT | Performed by: EMERGENCY MEDICINE

## 2021-11-20 PROCEDURE — 85025 COMPLETE CBC W/AUTO DIFF WBC: CPT | Performed by: EMERGENCY MEDICINE

## 2021-11-20 PROCEDURE — 80053 COMPREHEN METABOLIC PANEL: CPT | Performed by: EMERGENCY MEDICINE

## 2021-11-20 PROCEDURE — 74177 CT ABD & PELVIS W/CONTRAST: CPT

## 2021-11-20 RX ORDER — IOPAMIDOL 755 MG/ML
100 INJECTION, SOLUTION INTRAVASCULAR ONCE
Status: COMPLETED | OUTPATIENT
Start: 2021-11-20 | End: 2021-11-20

## 2021-11-20 RX ORDER — DIMENHYDRINATE 50 MG
50 TABLET ORAL EVERY 6 HOURS PRN
Qty: 28 TABLET | Refills: 0 | Status: SHIPPED | OUTPATIENT
Start: 2021-11-20 | End: 2021-11-27

## 2021-11-20 RX ORDER — DIMENHYDRINATE 50 MG
50 TABLET ORAL AT BEDTIME
Qty: 7 TABLET | Refills: 0 | Status: SHIPPED | OUTPATIENT
Start: 2021-11-20 | End: 2021-11-27

## 2021-11-20 RX ORDER — ACETAMINOPHEN 500 MG
1000 TABLET ORAL EVERY 6 HOURS
Qty: 56 TABLET | Refills: 0 | Status: SHIPPED | OUTPATIENT
Start: 2021-11-20 | End: 2021-11-27

## 2021-11-20 RX ORDER — IBUPROFEN 200 MG
400 TABLET ORAL EVERY 6 HOURS
Qty: 56 TABLET | Refills: 0 | Status: SHIPPED | OUTPATIENT
Start: 2021-11-20 | End: 2021-11-27

## 2021-11-20 RX ADMIN — IOPAMIDOL 100 ML: 755 INJECTION, SOLUTION INTRAVENOUS at 15:17

## 2021-11-20 ASSESSMENT — MIFFLIN-ST. JEOR: SCORE: 1211.64

## 2021-11-20 NOTE — ED PROVIDER NOTES
EMERGENCY DEPARTMENT ENCOUNTER      NAME: Shannon Christopher  AGE: 31 year old female  YOB: 1990  MRN: [id number]  EVALUATION DATE & TIME: 11/20/2021  2:14 PM    PCP: Sveta Sandoval    ED PROVIDER: Magali Riojas MD    Chief Complaint   Patient presents with     Back Pain         FINAL IMPRESSION:  1. Ureterolithiasis          ED COURSE & MEDICAL DECISION MAKING:    Pertinent Labs & Imaging studies reviewed. (See chart for details)  31 year old female with history of anxiety, cerebral palsy, borderline personality disorder status post hysterectomy who presents to the Emergency Department for evaluation of right lower quadrant abdominal pain radiating to the right low back that started this morning and has already spontaneously dissipated.  She does have some residual right lower quadrant abdominal pain on exam.  Differential includes appendicitis, UTI/pyelonephritis, ureterolithiasis, right-sided ovarian cyst, torsion.    Patient placed on monitor, IV established and blood obtained.  Given pain has already spontaneously dissipated.  CBC, CMP, lipase notable for WBC of 13.2.  Urine with hematuria.  CT abdomen pelvis shows evidence of a punctate ureterolithiasis at the right ureteropelvic junction with some infectious or inflammatory process.  Patient counseled regarding above, discharged home with expectant management and outpatient PCP referral for follow-up.      ED Course as of 11/20/21 1553   Sat Nov 20, 2021   1434 RBC Urine(!): 64   1440 WBC(!): 13.2       2:20 PM I met with the patient, obtained an initial history, performed an examination and discussed the plan. PPE worn throughout all interactions with the patient, including N95 mask, surgical mask, safety glasses, gloves.     At the conclusion of the encounter I discussed the results of all of the tests and the disposition. The questions were answered. The patient or family acknowledged understanding and was agreeable with the care  plan.    MEDICATIONS GIVEN IN THE EMERGENCY:  Medications   iopamidol (ISOVUE-370) solution 100 mL (100 mLs Intravenous Given 11/20/21 4407)       NEW PRESCRIPTIONS STARTED AT TODAY'S ER VISIT  New Prescriptions    ACETAMINOPHEN (TYLENOL) 500 MG TABLET    Take 2 tablets (1,000 mg) by mouth every 6 hours for 7 days    DIMENHYDRINATE (DRAMAMINE) 50 MG TABLET    Take 1 tablet (50 mg) by mouth At Bedtime for 7 days    DIMENHYDRINATE (DRAMAMINE) 50 MG TABLET    Take 1 tablet (50 mg) by mouth every 6 hours as needed for other (kidney stone pain management)    IBUPROFEN (ADVIL/MOTRIN) 200 MG TABLET    Take 2 tablets (400 mg) by mouth every 6 hours for 7 days          =================================================================    HPI    Patient information was obtained from: Patient    Use of Intrepreter: N/A      Shannon Christopher is a 31 year old female with pertinent medical history of s/p hysterectomy, cerebral palsy, borderline personality disorder, anxiety, who presents for evaluation of abdominal pain.     Patient reports a sudden onset of RLQ abdominal pain and right mid back pain that began this afternoon. She notes it has improved, but is still present. Currently, she rates her pain at 2/10. Patient is currently being treated for a yeast infection. No history of gallstones or kidney stones. She has had a hysterectomy, no other abdominal surgeries. No recent injuries to the back. Denies dysuria, hematuria, nausea, vomiting, or any additional symptoms at this time.     REVIEW OF SYSTEMS  Constitutional:  Denies fever, chills, weight loss or weakness  GI: Positive for abdominal pain. Denies nausea, vomiting, diarrhea  : Denies dysuria, denies hematuria  Musculoskeletal: Positive for back pain. Denies any new swelling or loss of function.  All other systems negative unless noted in HPI.      PAST MEDICAL HISTORY:  Past Medical History:   Diagnosis Date     ADD (attention deficit disorder)      Anxiety       Borderline mental retardation      Borderline personality disorder (H)      Cerebral palsy (H)      Depression      PONV (postoperative nausea and vomiting)      Uterine fibroid        PAST SURGICAL HISTORY:  Past Surgical History:   Procedure Laterality Date     ADENOIDECTOMY       APPLY EXTERNAL FIXATOR LOWER EXTREMITY Left 5/18/2021    Procedure: CLOSED REDUCTION EXTERNAL FIXATION, LEFT ANKLE FRACTURE;  Surgeon: Dereck Nielson DO;  Location: [facility];  Service: Orthopedics     BUNIONECTOMY       EYE MUSCLE SURGERY       FOOT CAPSULE RELEASE W/ PERCUTANEOUS HEEL CORD LENGTHENING, TIBIAL TENDON TRANSFER Right      LAPAROSCOPIC HYSTERECTOMY N/A 1/4/2019    Procedure: ROBOTIC TOTAL LAPAROSCOPIC HYSTERECTOMY, BILATERAL SALPINGECTOMY LYSIS OF ADHESIONS;  Surgeon: Jose Golden MD;  Location: [facility] OR;  Service: Gynecology     OPEN REDUCTION INTERNAL FIXATION ANKLE Left 5/28/2021    Procedure: WITH OPEN REDUCTION INTERNAL FIXATION, TRIMALLEOLAR, LEFT ANKLE;  Surgeon: Dereck Costa DO;  Location: [facility] OR;  Service: Orthopedics     REMOVE HARDWARE LOWER EXTREMITY Left 5/28/2021    Procedure: LEFT ANKLE EXTERNAL FIXATION REMOVAL;  Surgeon: Dereck Costa DO;  Location: [facility];  Service: Orthopedics     TYMPANOPLASTY         CURRENT MEDICATIONS:    Prior to Admission Medications   Prescriptions Last Dose Informant Patient Reported? Taking?   HYDROmorphone (DILAUDID) 2 MG tablet   Yes No   Sig: [HYDROMORPHONE (DILAUDID) 2 MG TABLET] Take 2-4 mg by mouth every 4 (four) hours as needed for pain.   LATUDA 60 mg Tab tablet   Yes No   Sig: [LATUDA 60 MG TAB TABLET] Take 60 mg by mouth at bedtime.    OLANZapine (ZYPREXA) 7.5 MG tablet   Yes No   Sig: [OLANZAPINE (ZYPREXA) 7.5 MG TABLET] Take 7.5 mg by mouth at bedtime.    acetaminophen (TYLENOL) 500 MG tablet   Yes No   Sig: [ACETAMINOPHEN (TYLENOL) 500 MG TABLET] Take 1,000 mg by mouth 3 (three) times a day.    aspirin 81 MG EC tablet   No No   Sig: [ASPIRIN 81 MG EC TABLET] Take 1 tablet (81 mg total) by mouth 2 (two) times a day.   divalproex (DEPAKOTE ER) 500 MG 24 hour tablet   Yes No   Sig: [DIVALPROEX (DEPAKOTE ER) 500 MG 24 HOUR TABLET] Take 500 mg by mouth at bedtime.    estradioL (ESTRACE) 2 MG tablet   Yes No   Sig: [ESTRADIOL (ESTRACE) 2 MG TABLET] Take 2 mg by mouth at bedtime.    hydrOXYzine HCL (ATARAX) 25 MG tablet   No No   Sig: [HYDROXYZINE HCL (ATARAX) 25 MG TABLET] Take 1 tablet (25 mg total) by mouth every 6 (six) hours as needed for itching (with pain, moderate pain).   ketorolac (TORADOL) 10 mg tablet   Yes No   Sig: [KETOROLAC (TORADOL) 10 MG TABLET] Take 10 mg by mouth every 6 (six) hours as needed for pain.   nystatin (MYCOSTATIN) cream   Yes No   Sig: [NYSTATIN (MYCOSTATIN) CREAM] Apply topically 3 (three) times a day as needed for dry skin.   polyethylene glycol (MIRALAX) 17 gram packet   No No   Sig: [POLYETHYLENE GLYCOL (MIRALAX) 17 GRAM PACKET] Take 1 packet (17 g total) by mouth daily.   senna-docusate (PERICOLACE) 8.6-50 mg tablet   No No   Sig: [SENNA-DOCUSATE (PERICOLACE) 8.6-50 MG TABLET] Take 1-2 tablets by mouth 2 (two) times a day. Take while on oral narcotics to prevent or treat constipation.   zonisamide (ZONEGRAN) 100 MG capsule   Yes No   Sig: [ZONISAMIDE (ZONEGRAN) 100 MG CAPSULE] Take 400 mg by mouth at bedtime.       Facility-Administered Medications: None       ALLERGIES:  No Known Allergies    FAMILY HISTORY:  History reviewed. No pertinent family history.    SOCIAL HISTORY:  Social History     Tobacco Use     Smoking status: Current Some Day Smoker     Smokeless tobacco: Never Used     Tobacco comment: < 1 PPW   Substance Use Topics     Alcohol use: Yes     Comment: Alcoholic Drinks/day: rarely     Drug use: No        VITALS:  Patient Vitals for the past 24 hrs:   BP Temp Temp src Pulse Resp SpO2 Height Weight   11/20/21 1424 -- 97.6  F (36.4  C) Oral -- -- -- -- --  "  11/20/21 1315 125/81 -- Oral 59 16 100 % 1.575 m (5' 2\") 55.3 kg (122 lb)       PHYSICAL EXAM    General Appearance: Well-appearing, well-nourished, no acute distress   Head:  Normocephalic  Eyes:   conjunctiva/corneas clear  ENT:  membranes are moist without pallor  Neck:  Supple  Cardio:  Regular rate and rhythm  Pulm:  No respiratory distress  Back:  No CVA tenderness, normal ROM  Abdomen: Old surgical scars on abdomen. RLQ tenderness. Soft, non distended,no rebound or guarding.  Extremities: Moves all extremities normally, normal gait  Skin:  Skin warm, dry, no rashes  Neuro:  Alert and oriented ×3, moving all extremities, no gross sensory defects     RADIOLOGY/LABS:  Reviewed all pertinent imaging. Please see official radiology report. All pertinent labs reviewed and interpreted.    Results for orders placed or performed during the hospital encounter of 11/20/21   Abd/pelvis CT,  IV  contrast only TRAUMA / AAA    Impression    IMPRESSION:   1.  Right renal collecting system infectious or inflammatory process with a suspected 1 mm right ureteropelvic junction calculus.   2.  Nonobstructing calculus in the lower pole of the left kidney.   UA with Microscopic reflex to Culture    Specimen: Urine, Clean Catch   Result Value Ref Range    Color Urine Yellow Colorless, Straw, Light Yellow, Yellow    Appearance Urine Turbid (A) Clear    Glucose Urine Negative Negative mg/dL    Bilirubin Urine Negative Negative    Ketones Urine 20  (A) Negative mg/dL    Specific Gravity Urine 1.025 1.001 - 1.030    Blood Urine 0.1 mg/dL (A) Negative    pH Urine 6.5 5.0 - 7.0    Protein Albumin Urine 30  (A) Negative mg/dL    Urobilinogen Urine <2.0 <2.0 mg/dL    Nitrite Urine Negative Negative    Leukocyte Esterase Urine Negative Negative    Mucus Urine Present (A) None Seen /LPF    RBC Urine 64 (H) <=2 /HPF    WBC Urine 4 <=5 /HPF    Squamous Epithelials Urine 3 (H) <=1 /HPF    Hyaline Casts Urine 1 <=2 /LPF   Comprehensive metabolic " panel   Result Value Ref Range    Sodium 138 136 - 145 mmol/L    Potassium 4.0 3.5 - 5.0 mmol/L    Chloride 107 98 - 107 mmol/L    Carbon Dioxide (CO2) 19 (L) 22 - 31 mmol/L    Anion Gap 12 5 - 18 mmol/L    Urea Nitrogen 14 8 - 22 mg/dL    Creatinine 0.89 0.60 - 1.10 mg/dL    Calcium 9.6 8.5 - 10.5 mg/dL    Glucose 138 (H) 70 - 125 mg/dL    Alkaline Phosphatase 64 45 - 120 U/L    AST 21 0 - 40 U/L    ALT 22 0 - 45 U/L    Protein Total 7.9 6.0 - 8.0 g/dL    Albumin 4.6 3.5 - 5.0 g/dL    Bilirubin Total 0.3 0.0 - 1.0 mg/dL    GFR Estimate 85 >60 mL/min/1.73m2   Result Value Ref Range    Lipase 28 0 - 52 U/L   CBC with platelets and differential   Result Value Ref Range    WBC Count 13.2 (H) 4.0 - 11.0 10e3/uL    RBC Count 4.92 3.80 - 5.20 10e6/uL    Hemoglobin 14.6 11.7 - 15.7 g/dL    Hematocrit 43.8 35.0 - 47.0 %    MCV 89 78 - 100 fL    MCH 29.7 26.5 - 33.0 pg    MCHC 33.3 31.5 - 36.5 g/dL    RDW 12.3 10.0 - 15.0 %    Platelet Count 279 150 - 450 10e3/uL    % Neutrophils 84 %    % Lymphocytes 11 %    % Monocytes 4 %    % Eosinophils 0 %    % Basophils 0 %    % Immature Granulocytes 1 %    NRBCs per 100 WBC 0 <1 /100    Absolute Neutrophils 11.1 (H) 1.6 - 8.3 10e3/uL    Absolute Lymphocytes 1.5 0.8 - 5.3 10e3/uL    Absolute Monocytes 0.6 0.0 - 1.3 10e3/uL    Absolute Eosinophils 0.0 0.0 - 0.7 10e3/uL    Absolute Basophils 0.0 0.0 - 0.2 10e3/uL    Absolute Immature Granulocytes 0.1 (H) <=0.0 10e3/uL    Absolute NRBCs 0.0 10e3/uL         The creation of this record is based on the scribe s observations of the work being performed by Magali Riojas MD and the provider s statements to them. It was created on his behalf by Radha Wynne, a trained medical scribe. This document has been checked and approved by the attending provider.    Magali Riojas MD  Emergency Medicine  [facility] EMERGENCY ROOM  [address] [facility] [id number] [phone]  Dept:  [phone]     Magali Riojas MD  11/20/21 5340

## 2021-11-20 NOTE — ED NOTES
"Gave pt strainer. Pt refused vitals because \"doctor said I was done and I can go home\" Pt was also worried about getting a bill and wanted registration to come back and verify her insurance information again. Pt was yelling \"no.no.no bill. No. No bill\".   "

## 2021-11-20 NOTE — ED TRIAGE NOTES
Patient presents to the ED with complaints of right sided low back pain and RLQ abdominal pains. She denies injury and the pain started earlier today.

## 2022-01-12 VITALS — BODY MASS INDEX: 22.73 KG/M2 | WEIGHT: 123.5 LBS | HEIGHT: 62 IN

## 2022-01-12 VITALS — HEIGHT: 62 IN | BODY MASS INDEX: 23.55 KG/M2 | WEIGHT: 128 LBS

## 2022-01-18 VITALS
HEIGHT: 65 IN | OXYGEN SATURATION: 99 % | WEIGHT: 116.4 LBS | HEART RATE: 86 BPM | DIASTOLIC BLOOD PRESSURE: 84 MMHG | BODY MASS INDEX: 20.93 KG/M2 | OXYGEN SATURATION: 100 % | SYSTOLIC BLOOD PRESSURE: 102 MMHG | HEIGHT: 65 IN | RESPIRATION RATE: 20 BRPM | OXYGEN SATURATION: 99 % | HEIGHT: 65 IN | RESPIRATION RATE: 20 BRPM | HEART RATE: 74 BPM | SYSTOLIC BLOOD PRESSURE: 127 MMHG | SYSTOLIC BLOOD PRESSURE: 132 MMHG | BODY MASS INDEX: 19.39 KG/M2 | WEIGHT: 122 LBS | TEMPERATURE: 98.7 F | DIASTOLIC BLOOD PRESSURE: 62 MMHG | TEMPERATURE: 99.1 F | DIASTOLIC BLOOD PRESSURE: 88 MMHG | WEIGHT: 125.6 LBS | HEART RATE: 86 BPM | TEMPERATURE: 99.4 F | RESPIRATION RATE: 20 BRPM | BODY MASS INDEX: 20.33 KG/M2

## 2022-01-18 VITALS
HEIGHT: 65 IN | HEART RATE: 103 BPM | WEIGHT: 122 LBS | BODY MASS INDEX: 20.33 KG/M2 | TEMPERATURE: 99.1 F | RESPIRATION RATE: 20 BRPM | DIASTOLIC BLOOD PRESSURE: 84 MMHG | SYSTOLIC BLOOD PRESSURE: 119 MMHG | OXYGEN SATURATION: 99 %

## 2022-01-18 VITALS
HEIGHT: 65 IN | BODY MASS INDEX: 19.39 KG/M2 | WEIGHT: 116.4 LBS | OXYGEN SATURATION: 100 % | SYSTOLIC BLOOD PRESSURE: 127 MMHG | HEART RATE: 68 BPM | TEMPERATURE: 98 F | RESPIRATION RATE: 18 BRPM | DIASTOLIC BLOOD PRESSURE: 54 MMHG

## 2022-03-18 ENCOUNTER — HOSPITAL ENCOUNTER (EMERGENCY)
Facility: CLINIC | Age: 32
Discharge: HOME | End: 2022-03-18
Attending: EMERGENCY MEDICINE | Admitting: EMERGENCY MEDICINE
Payer: MEDICARE

## 2022-03-18 VITALS
DIASTOLIC BLOOD PRESSURE: 109 MMHG | WEIGHT: 120 LBS | BODY MASS INDEX: 21.95 KG/M2 | OXYGEN SATURATION: 96 % | RESPIRATION RATE: 20 BRPM | HEART RATE: 87 BPM | TEMPERATURE: 97.6 F | SYSTOLIC BLOOD PRESSURE: 165 MMHG

## 2022-03-18 DIAGNOSIS — H66.001 ACUTE SUPPURATIVE OTITIS MEDIA OF RIGHT EAR WITHOUT SPONTANEOUS RUPTURE OF TYMPANIC MEMBRANE, RECURRENCE NOT SPECIFIED: ICD-10-CM

## 2022-03-18 PROCEDURE — 99283 EMERGENCY DEPT VISIT LOW MDM: CPT

## 2022-03-18 RX ORDER — AMOXICILLIN 500 MG/1
500 CAPSULE ORAL 3 TIMES DAILY
Qty: 21 CAPSULE | Refills: 0 | Status: SHIPPED | OUTPATIENT
Start: 2022-03-18 | End: 2022-03-25

## 2022-03-19 NOTE — ED PROVIDER NOTES
EMERGENCY DEPARTMENT ENCOUNTER      NAME: Shannon Christopher  AGE: 31 year old female  YOB: 1990  MRN: [id number]  EVALUATION DATE & TIME: No admission date for patient encounter.    PCP: Sveta Sandoval    ED PROVIDER: Magali Riojas MD    Chief Complaint   Patient presents with     Otalgia         FINAL IMPRESSION:  1. Acute suppurative otitis media of right ear without spontaneous rupture of tympanic membrane, recurrence not specified          ED COURSE & MEDICAL DECISION MAKING:    Pertinent Labs & Imaging studies reviewed. (See chart for details)  31 year old female with history of cerebral palsy, anxiety/depression who presents to the Emergency Department for evaluation of 4 days of sinus congestion and worsening right-sided ear pain.  She notes a small amount of drainage, on exam she has classic evidence of otitis media.  No significant otitis externa.  No perforation, evidence of mastoiditis.  Will treat with amoxicillin, discharged home.           9:26 PM I met with the patient, obtained history, performed an initial exam, and discussed options and plan for diagnostics and treatment here in the ED. PPE worn: surgical mask, gloves    At the conclusion of the encounter I discussed the results of all of the tests and the disposition. The questions were answered. The patient or family acknowledged understanding and was agreeable with the care plan.      MEDICATIONS GIVEN IN THE EMERGENCY:  Medications - No data to display    NEW PRESCRIPTIONS STARTED AT TODAY'S ER VISIT  Discharge Medication List as of 3/18/2022 10:04 PM      START taking these medications    Details   amoxicillin (AMOXIL) 500 MG capsule Take 1 capsule (500 mg) by mouth 3 times daily for 7 days, Disp-21 capsule, R-0, Local Print                =================================================================    HPI    Patient information was obtained from: Patient     Use of Intrepreter: N/A        Shannon Christopher is a 31 year old  female with pertinent medical history of cerebral palsy and anxiety who presents otalgia.     The patient reports of otalgia since 3/14 (4 days). She adds that she has noticed scant drainage on her pillow from her ear. She has a sinus infection and has been dealing with rhinorrhea and congestion. No other symptoms or complaints at this time.       REVIEW OF SYSTEMS  Constitutional:  Denies fever, chills, weight loss or weakness  Eyes:  No pain, discharge, redness  HENT: Positive for ear pain. Positive for ear drainage. Positive for rhinorrhea and congestion. Denies sore throat  Respiratory: No SOB, wheeze or cough  Neurologic:  Denies headache, focal weakness or sensory changes  All other systems negative unless noted in HPI.      PAST MEDICAL HISTORY:  Past Medical History:   Diagnosis Date     ADD (attention deficit disorder)      Anxiety      Borderline mental retardation      Borderline personality disorder (H)      Cerebral palsy (H)      Depression      PONV (postoperative nausea and vomiting)      Uterine fibroid        PAST SURGICAL HISTORY:  Past Surgical History:   Procedure Laterality Date     ADENOIDECTOMY       APPLY EXTERNAL FIXATOR LOWER EXTREMITY Left 5/18/2021    Procedure: CLOSED REDUCTION EXTERNAL FIXATION, LEFT ANKLE FRACTURE;  Surgeon: Dereck Nielson DO;  Location: [facility];  Service: Orthopedics     BUNIONECTOMY       EYE MUSCLE SURGERY       FOOT CAPSULE RELEASE W/ PERCUTANEOUS HEEL CORD LENGTHENING, TIBIAL TENDON TRANSFER Right      LAPAROSCOPIC HYSTERECTOMY N/A 1/4/2019    Procedure: ROBOTIC TOTAL LAPAROSCOPIC HYSTERECTOMY, BILATERAL SALPINGECTOMY LYSIS OF ADHESIONS;  Surgeon: Jose Golden MD;  Location: [facility] OR;  Service: Gynecology     OPEN REDUCTION INTERNAL FIXATION ANKLE Left 5/28/2021    Procedure: WITH OPEN REDUCTION INTERNAL FIXATION, TRIMALLEOLAR, LEFT ANKLE;  Surgeon: Dereck Costa DO;  Location: [facility];  Service: Orthopedics      REMOVE HARDWARE LOWER EXTREMITY Left 5/28/2021    Procedure: LEFT ANKLE EXTERNAL FIXATION REMOVAL;  Surgeon: Dereck Costa DO;  Location: [facility] OR;  Service: Orthopedics     TYMPANOPLASTY         CURRENT MEDICATIONS:    Prior to Admission Medications   Prescriptions Last Dose Informant Patient Reported? Taking?   HYDROmorphone (DILAUDID) 2 MG tablet   Yes No   Sig: [HYDROMORPHONE (DILAUDID) 2 MG TABLET] Take 2-4 mg by mouth every 4 (four) hours as needed for pain.   LATUDA 60 mg Tab tablet   Yes No   Sig: [LATUDA 60 MG TAB TABLET] Take 60 mg by mouth at bedtime.    OLANZapine (ZYPREXA) 7.5 MG tablet   Yes No   Sig: [OLANZAPINE (ZYPREXA) 7.5 MG TABLET] Take 7.5 mg by mouth at bedtime.    acetaminophen (TYLENOL) 500 MG tablet   Yes No   Sig: [ACETAMINOPHEN (TYLENOL) 500 MG TABLET] Take 1,000 mg by mouth 3 (three) times a day.   aspirin 81 MG EC tablet   No No   Sig: [ASPIRIN 81 MG EC TABLET] Take 1 tablet (81 mg total) by mouth 2 (two) times a day.   divalproex (DEPAKOTE ER) 500 MG 24 hour tablet   Yes No   Sig: [DIVALPROEX (DEPAKOTE ER) 500 MG 24 HOUR TABLET] Take 500 mg by mouth at bedtime.    estradioL (ESTRACE) 2 MG tablet   Yes No   Sig: [ESTRADIOL (ESTRACE) 2 MG TABLET] Take 2 mg by mouth at bedtime.    hydrOXYzine HCL (ATARAX) 25 MG tablet   No No   Sig: [HYDROXYZINE HCL (ATARAX) 25 MG TABLET] Take 1 tablet (25 mg total) by mouth every 6 (six) hours as needed for itching (with pain, moderate pain).   ketorolac (TORADOL) 10 mg tablet   Yes No   Sig: [KETOROLAC (TORADOL) 10 MG TABLET] Take 10 mg by mouth every 6 (six) hours as needed for pain.   nystatin (MYCOSTATIN) cream   Yes No   Sig: [NYSTATIN (MYCOSTATIN) CREAM] Apply topically 3 (three) times a day as needed for dry skin.   polyethylene glycol (MIRALAX) 17 gram packet   No No   Sig: [POLYETHYLENE GLYCOL (MIRALAX) 17 GRAM PACKET] Take 1 packet (17 g total) by mouth daily.   senna-docusate (PERICOLACE) 8.6-50 mg tablet   No No   Sig:  [SENNA-DOCUSATE (PERICOLACE) 8.6-50 MG TABLET] Take 1-2 tablets by mouth 2 (two) times a day. Take while on oral narcotics to prevent or treat constipation.   zonisamide (ZONEGRAN) 100 MG capsule   Yes No   Sig: [ZONISAMIDE (ZONEGRAN) 100 MG CAPSULE] Take 400 mg by mouth at bedtime.       Facility-Administered Medications: None       ALLERGIES:  No Known Allergies    FAMILY HISTORY:  History reviewed. No pertinent family history.    SOCIAL HISTORY:  Social History     Tobacco Use     Smoking status: Current Some Day Smoker     Smokeless tobacco: Never Used     Tobacco comment: < 1 PPW   Substance Use Topics     Alcohol use: Yes     Comment: Alcoholic Drinks/day: rarely     Drug use: No        VITALS:  Patient Vitals for the past 24 hrs:   BP Temp Temp src Pulse Resp SpO2 Weight   03/18/22 2047 (!) 165/109 97.6  F (36.4  C) Oral 87 20 96 % 54.4 kg (120 lb)       PHYSICAL EXAM    General Appearance: Well-appearing, well-nourished, no acute distress  Head:  Normocephalic  Eyes:  conjunctiva/corneas clear  ENT:  Lips, mucosa, and tongue normal; membranes are moist without pallor. Right sided TM with errythema buldging and small discharge in auditory canal but no rupture of TM  Neck:  Supple  Cardio:  Regular rate and rhythm  Pulm:  No respiratory distress  Extremities: Moves all extremities normally, normal gait  Skin:  Skin warm, dry, no rashes  Neuro:  Alert and oriented ×3, moving all extremities, no gross sensory defects     The creation of this record is based on the scribe s observations of the work being performed by Magali Riojas MD and the provider s statements to them. It was created on his behalf by Rhett Dooley, a trained medical scribe. This document has been checked and approved by the attending provider.    Magali Riojas MD  Emergency Medicine  [facility] EMERGENCY ROOM  [address] [facility] [id number] [phone]  Dept: [phone]      Magali Riojas MD  03/18/22 2761

## 2022-07-29 ENCOUNTER — HOSPITAL ENCOUNTER (EMERGENCY)
Facility: CLINIC | Age: 32
Discharge: HOME | End: 2022-07-30
Attending: EMERGENCY MEDICINE | Admitting: EMERGENCY MEDICINE
Payer: MEDICARE

## 2022-07-29 ENCOUNTER — APPOINTMENT (OUTPATIENT)
Dept: CT IMAGING | Facility: CLINIC | Age: 32
End: 2022-07-29
Attending: EMERGENCY MEDICINE
Payer: MEDICARE

## 2022-07-29 VITALS
BODY MASS INDEX: 21.95 KG/M2 | HEART RATE: 67 BPM | SYSTOLIC BLOOD PRESSURE: 122 MMHG | HEIGHT: 62 IN | RESPIRATION RATE: 16 BRPM | TEMPERATURE: 98.5 F | DIASTOLIC BLOOD PRESSURE: 83 MMHG | OXYGEN SATURATION: 99 %

## 2022-07-29 DIAGNOSIS — F31.9 BIPOLAR I DISORDER (H): ICD-10-CM

## 2022-07-29 LAB
AMPHETAMINES UR QL SCN: NORMAL
ANION GAP SERPL CALCULATED.3IONS-SCNC: 9 MMOL/L (ref 5–18)
BARBITURATES UR QL: NORMAL
BASOPHILS # BLD AUTO: 0 10E3/UL (ref 0–0.2)
BASOPHILS NFR BLD AUTO: 1 %
BENZODIAZ UR QL: NORMAL
BUN SERPL-MCNC: 13 MG/DL (ref 8–22)
CALCIUM SERPL-MCNC: 9.1 MG/DL (ref 8.5–10.5)
CANNABINOIDS UR QL SCN: NORMAL
CHLORIDE BLD-SCNC: 111 MMOL/L (ref 98–107)
CO2 SERPL-SCNC: 17 MMOL/L (ref 22–31)
COCAINE UR QL: NORMAL
CREAT SERPL-MCNC: 0.72 MG/DL (ref 0.6–1.1)
CREAT UR-MCNC: 36 MG/DL
EOSINOPHIL # BLD AUTO: 0.1 10E3/UL (ref 0–0.7)
EOSINOPHIL NFR BLD AUTO: 1 %
ERYTHROCYTE [DISTWIDTH] IN BLOOD BY AUTOMATED COUNT: 12.4 % (ref 10–15)
ETHANOL SERPL-MCNC: <10 MG/DL
GFR SERPL CREATININE-BSD FRML MDRD: >90 ML/MIN/1.73M2
GLUCOSE BLD-MCNC: 80 MG/DL (ref 70–125)
HCG UR QL: NEGATIVE
HCT VFR BLD AUTO: 39.9 % (ref 35–47)
HGB BLD-MCNC: 13.8 G/DL (ref 11.7–15.7)
IMM GRANULOCYTES # BLD: 0 10E3/UL
IMM GRANULOCYTES NFR BLD: 0 %
LYMPHOCYTES # BLD AUTO: 3.8 10E3/UL (ref 0.8–5.3)
LYMPHOCYTES NFR BLD AUTO: 44 %
MCH RBC QN AUTO: 30.3 PG (ref 26.5–33)
MCHC RBC AUTO-ENTMCNC: 34.6 G/DL (ref 31.5–36.5)
MCV RBC AUTO: 88 FL (ref 78–100)
METHADONE UR QL SCN: NORMAL
MONOCYTES # BLD AUTO: 0.6 10E3/UL (ref 0–1.3)
MONOCYTES NFR BLD AUTO: 6 %
NEUTROPHILS # BLD AUTO: 4.1 10E3/UL (ref 1.6–8.3)
NEUTROPHILS NFR BLD AUTO: 48 %
NRBC # BLD AUTO: 0 10E3/UL
NRBC BLD AUTO-RTO: 0 /100
OPIATES UR QL SCN: NORMAL
OXYCODONE UR QL: NORMAL
PCP UR QL SCN: NORMAL
PLATELET # BLD AUTO: 133 10E3/UL (ref 150–450)
POTASSIUM BLD-SCNC: 3.5 MMOL/L (ref 3.5–5)
RBC # BLD AUTO: 4.56 10E6/UL (ref 3.8–5.2)
SODIUM SERPL-SCNC: 137 MMOL/L (ref 136–145)
WBC # BLD AUTO: 8.5 10E3/UL (ref 4–11)

## 2022-07-29 PROCEDURE — 81025 URINE PREGNANCY TEST: CPT | Performed by: EMERGENCY MEDICINE

## 2022-07-29 PROCEDURE — G1010 CDSM STANSON: HCPCS

## 2022-07-29 PROCEDURE — 250N000013 HC RX MED GY IP 250 OP 250 PS 637: Performed by: EMERGENCY MEDICINE

## 2022-07-29 PROCEDURE — 80048 BASIC METABOLIC PNL TOTAL CA: CPT | Performed by: EMERGENCY MEDICINE

## 2022-07-29 PROCEDURE — 85025 COMPLETE CBC W/AUTO DIFF WBC: CPT | Performed by: EMERGENCY MEDICINE

## 2022-07-29 PROCEDURE — 90791 PSYCH DIAGNOSTIC EVALUATION: CPT

## 2022-07-29 PROCEDURE — 36415 COLL VENOUS BLD VENIPUNCTURE: CPT | Performed by: EMERGENCY MEDICINE

## 2022-07-29 PROCEDURE — 99285 EMERGENCY DEPT VISIT HI MDM: CPT | Mod: 25

## 2022-07-29 PROCEDURE — 84443 ASSAY THYROID STIM HORMONE: CPT | Performed by: EMERGENCY MEDICINE

## 2022-07-29 PROCEDURE — 82077 ASSAY SPEC XCP UR&BREATH IA: CPT | Performed by: EMERGENCY MEDICINE

## 2022-07-29 PROCEDURE — 80307 DRUG TEST PRSMV CHEM ANLYZR: CPT | Performed by: EMERGENCY MEDICINE

## 2022-07-29 RX ORDER — OLANZAPINE 10 MG/1
10 TABLET ORAL AT BEDTIME
Qty: 10 TABLET | Refills: 0 | Status: SHIPPED | OUTPATIENT
Start: 2022-07-29 | End: 2022-08-08

## 2022-07-29 RX ORDER — OLANZAPINE 10 MG/1
10 TABLET ORAL ONCE
Status: COMPLETED | OUTPATIENT
Start: 2022-07-29 | End: 2022-07-29

## 2022-07-29 RX ADMIN — OLANZAPINE 10 MG: 10 TABLET, FILM COATED ORAL at 22:51

## 2022-07-29 ASSESSMENT — ENCOUNTER SYMPTOMS
CONFUSION: 1
HALLUCINATIONS: 1
DECREASED CONCENTRATION: 1
PALPITATIONS: 0
HYPERACTIVE: 1
CHILLS: 1
DIZZINESS: 0
SLEEP DISTURBANCE: 1
TREMORS: 1
FEVER: 0
SHORTNESS OF BREATH: 1
COUGH: 0
NERVOUS/ANXIOUS: 1

## 2022-07-30 LAB — TSH SERPL DL<=0.005 MIU/L-ACNC: 2.57 UIU/ML (ref 0.3–5)

## 2022-07-30 NOTE — ED PROVIDER NOTES
ED Behavioral Health Patient Transition of Care Note    Assuming care from:  Dr. Massiel Kaufman    Brief history: 32 year old female presents to the Emergency Department for evaluation of mental health symptoms.  She reports she feels unbalanced.  She feels paranoid.  She has medications for bipolar disorder which she reports she has been compliant with.  No recent changes in her medications.  She denies any illicit drug use since 6/20.  She reports she felt like symptoms started around 3 weeks ago where she had an unresponsive episode of Mcgrath's.  She notes racing thoughts, visual and auditory hallucinations.  She sees shadows.  She has an appointment with her psychiatrist, Dr. Rosario on Tuesday but does not think she can make it until then.  She denies any suicidal ideation but reports she is afraid she will do something if she has not get help prior to her appointment.     Any outstanding medical concerns:  Awaiting and lab and imaging results    Has SW seen the patient:  yes    Is the patient on a hold:  no    Current plan for disposition:  Awaiting SW evaluation    Safety concerns:  No, pt has been cooperative    Dietary restrictions:  None, pt can eat and drink ad tonja    Have daily medications been ordered:  no daily meds needed    Significant events during shift:    10:00 PM Signout accepted from Dr. Massiel Kaufman. Prior records were reviewed. Diagnostics from this visit are reviewed.  10:55 PM I spoke with the DEC accessor concerning the patient who's comfortable with sending her home, which we are comfortable with this plan.    Labs-  Results for orders placed or performed during the hospital encounter of 07/29/22   Head CT w/o contrast    Impression    IMPRESSION:  1.  No acute intracranial pathology.   Basic metabolic panel   Result Value Ref Range    Sodium 137 136 - 145 mmol/L    Potassium 3.5 3.5 - 5.0 mmol/L    Chloride 111 (H) 98 - 107 mmol/L    Carbon Dioxide (CO2) 17 (L) 22 - 31 mmol/L    Anion  Gap 9 5 - 18 mmol/L    Urea Nitrogen 13 8 - 22 mg/dL    Creatinine 0.72 0.60 - 1.10 mg/dL    Calcium 9.1 8.5 - 10.5 mg/dL    Glucose 80 70 - 125 mg/dL    GFR Estimate >90 >60 mL/min/1.73m2   Ethyl Alcohol Level   Result Value Ref Range    Alcohol, Blood <10 None detected mg/dL   Drugs of Abuse 1+ Panel, Urine ([facility] Only)   Result Value Ref Range    Amphetamines Urine Screen Negative Screen Negative    Benzodiazepines Urine Screen Negative Screen Negative    Opiates Urine Screen Negative Screen Negative    PCP Urine Screen Negative Screen Negative    Cannabinoids Urine Screen Negative Screen Negative    Barbiturates Urine Screen Negative Screen Negative    Cocaine Urine Screen Negative Screen Negative    Methadone Urine Screen Negative Screen Negative    Oxycodone Urine Screen Negative Screen Negative    Creatinine Urine mg/dL 36 mg/dL   CBC with platelets and differential   Result Value Ref Range    WBC Count 8.5 4.0 - 11.0 10e3/uL    RBC Count 4.56 3.80 - 5.20 10e6/uL    Hemoglobin 13.8 11.7 - 15.7 g/dL    Hematocrit 39.9 35.0 - 47.0 %    MCV 88 78 - 100 fL    MCH 30.3 26.5 - 33.0 pg    MCHC 34.6 31.5 - 36.5 g/dL    RDW 12.4 10.0 - 15.0 %    Platelet Count 133 (L) 150 - 450 10e3/uL    % Neutrophils 48 %    % Lymphocytes 44 %    % Monocytes 6 %    % Eosinophils 1 %    % Basophils 1 %    % Immature Granulocytes 0 %    NRBCs per 100 WBC 0 <1 /100    Absolute Neutrophils 4.1 1.6 - 8.3 10e3/uL    Absolute Lymphocytes 3.8 0.8 - 5.3 10e3/uL    Absolute Monocytes 0.6 0.0 - 1.3 10e3/uL    Absolute Eosinophils 0.1 0.0 - 0.7 10e3/uL    Absolute Basophils 0.0 0.0 - 0.2 10e3/uL    Absolute Immature Granulocytes 0.0 <=0.4 10e3/uL    Absolute NRBCs 0.0 10e3/uL            1. Bipolar I disorder (H)          Janice WELDON, am serving as a scribe to document services personally performed by Dr. Ellis based on my observation and the provider's statements to me. I, Rebecca Ellis MD attest that Janice Rascon  is acting in a scribe capacity, has observed my performance of the services and has documented them in accordance with my direction.    Rebecca Ellis MD  Emergency Medicine  [facility] EMERGENCY ROOM  [address] [address]  [phone]  Dept: [phone]       Rebecca Ellis MD  07/29/22 9499

## 2022-07-30 NOTE — ED PROVIDER NOTES
EMERGENCY DEPARTMENT ENCOUNTER      NAME: Shannon Christopher  AGE: 32 year old female  YOB: 1990  MRN: [id number]  EVALUATION DATE & TIME: 7/29/2022  8:08 PM    PCP: Sveta Sandoval    ED PROVIDER: Massiel Kaufman DO      Chief Complaint   Patient presents with     Medication Reaction     Psychiatric Evaluation         FINAL IMPRESSION:  No diagnosis found.      ED COURSE & MEDICAL DECISION MAKING:    Pertinent Labs & Imaging studies reviewed. (See chart for details)  9:12 PM I met the patient and performed my initial interview and exam. PPE: Provider wore gloves, and paper mask.      32 year old female presents to the Emergency Department for evaluation of mental health symptoms.  She reports she feels unbalanced.  She feels paranoid.  She has medications for bipolar disorder which she reports she has been compliant with.  No recent changes in her medications.  She denies any illicit drug use since 6/20.  She reports she felt like symptoms started around 3 weeks ago where she had an unresponsive episode of Mcgrath's.  She notes racing thoughts, visual and auditory hallucinations.  She sees shadows.  She has an appointment with her psychiatrist, Dr. Rosario on Tuesday but does not think she can make it until then.  She denies any suicidal ideation but reports she is afraid she will do something if she has not get help prior to her appointment.  She denies any infectious symptoms.  No signs of serotonin syndrome.  She has had a lot of recent stress.  Patient is slightly agitated on my exam.  She is alert and oriented.  Vitals are unremarkable.  No neck rigidity.  Plan for basic labs to rule out metabolic cause, UDS, CT of the head.  UDS is negative.  Pending labs and head CT results.  We will have the DEC  speak with her.  She was given a dose of Zyprexa in the ED to help with her symptoms.    At the conclusion of the encounter I discussed the results of all of the tests and the disposition. The  "questions were answered. The patient or family acknowledged understanding and was agreeable with the care plan.     MEDICATIONS GIVEN IN THE EMERGENCY:  Medications   OLANZapine (zyPREXA) tablet 10 mg (has no administration in time range)       NEW PRESCRIPTIONS STARTED AT TODAY'S ER VISIT  New Prescriptions    No medications on file          =================================================================    HPI    Patient information was obtained from: the patient and her partner    Use of : N/A         Shannon Christopher is a 32 year old female with a pertinent history of depression, borderline personality disorder, anxiety, cerebral palsy, ADD, and bipolar 1 disorder who presents to this ED via walk in for evaluation of medication reaction and mental health problem.     The patient reports that she feels as though her mental health medications are no longer working. She states that she feels \"unbalanced,\" and like she is \"coming down from a high.\" She denies any recent drug use, with her last using methamphetamine on 6/20. She has not had any medication changes for over six months, and states that she is taking all her medications as prescribed.     The patient notes that about three weeks ago she was in the car with her friend at [facility], where she had an episode in which she lost consciousness. She states she may have had a seizure, but she is not sure. Her friend did not notice that anything had happened. She states that her current symptoms began after this incident but they worsened considerably this afternoon.     The patient endorses racing thoughts, confusion, and visual and auditory hallucinations. Her visual hallucinations include seeing shadows, and she notes that she only rarely has auditory hallucinations. She also endorses irritability, difficulty concentrating, hyperactivity, difficulty sleeping, chills, and tremors, as well as shortness of breath and chest pain which she believes is " "due to anxiety.    The patient states that she needs help as she feels like she cannot \"funciton in life.\" She has an appointment with her psychologist on 8/1, but states she cannot wait that long before getting help. She notes that she has had manic episodes in the past, and her current symptoms feel similar to the symptoms that she experienced with that, but \"10x worse.\"    The patient denies suicidal ideation, fever, cough, dizziness, palpitations, and any other symptoms or complaints at this time.     Per the patient's boyfriend: The patient began having difficulty maintaining a conversation and acting as though she is intoxicated this afternoon. She has had increased stress the last week, due to her job and finances, which he believes may be contributing to her symptoms.     ScHx: The patient last used marijuana several months ago. Her last alcohol use was a sip of alcohol a few weeks ago. Her last polysubstance use was methamphetamine on 6/20. She endorses smoking, but smokes less than one pack a day, as she can't afford anything more than that.     REVIEW OF SYSTEMS   Review of Systems   Constitutional: Positive for chills. Negative for fever.   Respiratory: Positive for shortness of breath. Negative for cough.    Cardiovascular: Positive for chest pain. Negative for palpitations.   Neurological: Positive for tremors. Negative for dizziness.   Psychiatric/Behavioral: Positive for confusion, decreased concentration, hallucinations (auditory and visual) and sleep disturbance. Negative for suicidal ideas. The patient is nervous/anxious and is hyperactive.         Positive for irritability   All other systems reviewed and are negative.      PAST MEDICAL HISTORY:  Past Medical History:   Diagnosis Date     ADD (attention deficit disorder)      Anxiety      Borderline mental retardation      Borderline personality disorder (H)      Cerebral palsy (H)      Depression      PONV (postoperative nausea and vomiting)  "     Uterine fibroid        PAST SURGICAL HISTORY:  Past Surgical History:   Procedure Laterality Date     ADENOIDECTOMY       APPLY EXTERNAL FIXATOR LOWER EXTREMITY Left 5/18/2021    Procedure: CLOSED REDUCTION EXTERNAL FIXATION, LEFT ANKLE FRACTURE;  Surgeon: Dereck Nielson DO;  Location: [facility];  Service: Orthopedics     BUNIONECTOMY       EYE MUSCLE SURGERY       FOOT CAPSULE RELEASE W/ PERCUTANEOUS HEEL CORD LENGTHENING, TIBIAL TENDON TRANSFER Right      LAPAROSCOPIC HYSTERECTOMY N/A 1/4/2019    Procedure: ROBOTIC TOTAL LAPAROSCOPIC HYSTERECTOMY, BILATERAL SALPINGECTOMY LYSIS OF ADHESIONS;  Surgeon: Jose Golden MD;  Location: [facility] OR;  Service: Gynecology     OPEN REDUCTION INTERNAL FIXATION ANKLE Left 5/28/2021    Procedure: WITH OPEN REDUCTION INTERNAL FIXATION, TRIMALLEOLAR, LEFT ANKLE;  Surgeon: Dereck Costa DO;  Location: [facility] OR;  Service: Orthopedics     REMOVE HARDWARE LOWER EXTREMITY Left 5/28/2021    Procedure: LEFT ANKLE EXTERNAL FIXATION REMOVAL;  Surgeon: Dereck Costa DO;  Location: [facility] OR;  Service: Orthopedics     TYMPANOPLASTY             CURRENT MEDICATIONS:    acetaminophen (TYLENOL) 500 MG tablet  aspirin 81 MG EC tablet  divalproex (DEPAKOTE ER) 500 MG 24 hour tablet  estradioL (ESTRACE) 2 MG tablet  HYDROmorphone (DILAUDID) 2 MG tablet  hydrOXYzine HCL (ATARAX) 25 MG tablet  ketorolac (TORADOL) 10 mg tablet  LATUDA 60 mg Tab tablet  nystatin (MYCOSTATIN) cream  OLANZapine (ZYPREXA) 7.5 MG tablet  polyethylene glycol (MIRALAX) 17 gram packet  senna-docusate (PERICOLACE) 8.6-50 mg tablet  zonisamide (ZONEGRAN) 100 MG capsule         ALLERGIES:  No Known Allergies    FAMILY HISTORY:  History reviewed. No pertinent family history.    SOCIAL HISTORY:   Social History     Socioeconomic History     Marital status: Single   Tobacco Use     Smoking status: Current Some Day Smoker     Smokeless tobacco: Never Used     Tobacco  "comment: < 1 PPW   Substance and Sexual Activity     Alcohol use: Yes     Comment: Alcoholic Drinks/day: rarely     Drug use: No   Social History Narrative    Patient arrived alone to the ED 08/13/17         VITALS:  /83   Pulse 78   Temp 98.5  F (36.9  C) (Oral)   Resp 16   Ht 1.575 m (5' 2\")   SpO2 99%   BMI 21.95 kg/m      PHYSICAL EXAM    Physical Exam  Vitals and nursing note reviewed.   Constitutional:       General: She is not in acute distress.     Appearance: Normal appearance.      Comments: Patient constantly moving throughout exam.   HENT:      Head: Normocephalic and atraumatic.   Eyes:      Pupils: Pupils are equal, round, and reactive to light.   Cardiovascular:      Rate and Rhythm: Normal rate and regular rhythm.   Pulmonary:      Effort: Pulmonary effort is normal.   Abdominal:      General: Abdomen is flat.   Musculoskeletal:         General: Normal range of motion.   Skin:     General: Skin is warm and dry.   Neurological:      General: No focal deficit present.      Mental Status: She is alert.      Gait: Gait normal.   Psychiatric:         Mood and Affect: Mood is anxious.         Speech: Speech is rapid and pressured.         Behavior: Behavior is agitated and hyperactive.         Thought Content: Thought content is paranoid. Thought content does not include homicidal or suicidal ideation.          LAB:  All pertinent labs reviewed and interpreted.  Labs Ordered and Resulted from Time of ED Arrival to Time of ED Departure   DRUGS OF ABUSE 1+ PANEL, URINE ([facility] ONLY)       Result Value    Amphetamines Urine Screen Negative      Benzodiazepines Urine Screen Negative      Opiates Urine Screen Negative      PCP Urine Screen Negative      Cannabinoids Urine Screen Negative      Barbiturates Urine Screen Negative      Cocaine Urine Screen Negative      Methadone Urine Screen Negative      Oxycodone Urine Screen Negative      Creatinine Urine mg/dL 36     BASIC METABOLIC PANEL   ETHYL " ALCOHOL LEVEL   TSH WITH FREE T4 REFLEX   CBC WITH PLATELETS AND DIFFERENTIAL       RADIOLOGY:  Reviewed all pertinent imaging. Please see official radiology report.  Head CT w/o contrast    (Results Pending)       I, Aislinn Kemp, am serving as a scribe to document services personally performed by Dr. Massiel Kaufman based on my observation and the provider's statements to me. I, Massiel Kaufman, DO attest that Aislinn Kmep is acting in a scribe capacity, has observed my performance of the services and has documented them in accordance with my direction.    Massiel Kaufman DO  Emergency Medicine  [facility] EMERGENCY ROOM  [address] [id number] [phone]  Dept: [phone]      Massiel Kaufman DO  07/29/22 2228

## 2022-07-30 NOTE — DISCHARGE INSTRUCTIONS
"Aftercare Plan  If I am feeling unsafe or I am in a crisis, I will:   Contact my established care providers   Call the National Suicide Prevention Lifeline: 988  Go to the nearest emergency room   Call 911     Warning signs that I or other people might notice when a crisis is developing for me: difficulty functioning with daily activities, irritability, feeling \"not all there\", impulsivity      Things I am able to do on my own to cope or help me feel better: take medication as prescribed, use swing set, eat nutritious food, listen to Presybeterian videos on Facebook, reflections program exercises        Things that I am able to do with others to cope or help me better: talk on the phone with others, spend time outside      Things I can use or do for distraction: swing set      Changes I can make to support my mental health and wellness: eat meals regularly     People in my life that I can ask for help: Ritesh, macie, Eden Arzola, and Radha      [facility] has a mental health crisis team you can call 24/7: [facility]  [phone]      Additional resources and information:     Please attend your psychiatry appointment on 8/2/22 and your mental health therapy appointment on 8/8/22 at [facility].     Crisis Lines  Crisis Text Line  Text 704135  You will be connected with a trained live crisis counselor to provide support.    The [employer] (LGBTQ Youth Crisis Line)  2.248.098.6685  text START to 018-510    Vouchr  Fast Tracker  Linking people to mental health and substance use disorder resources  [employer] P[employer].org     [place] Mental [facility] Warm Line  Peer to peer support  Monday thru Saturday, 12 pm to 10 pm  556.744.7321 or 1.766.851.2436  Text \"Support\" to 68521    National [place] on Mental Illness (NORMA)  714.969.3126 or 1.888.NORMA.HELPS      Mental Health Apps  My3  https://myNuve.org/    MicrofabricaeBox  " https://[employer]/apps/virtual-hope-box/      Additional Information  Today you were seen by a licensed mental health professional through Triage and Transition services, [facility] (P)  for a crisis assessment in the Emergency Department at [facility].  It is recommended that you follow up with your established providers (psychiatrist, mental health therapist, and/or primary care doctor - as relevant) as soon as possible. Coordinators from [facility] will be calling you in the next 24-48 hours to ensure that you have the resources you need.  You can also contact [facility] coordinators directly at [phone]. You may have been scheduled for or offered an appointment with a mental health provider. [facility] maintains an extensive network of licensed behavioral health providers to connect patients with the services they need.  We do not charge providers a fee to participate in our referral network.  We match patients with providers based on a patient's specific needs, insurance coverage, and location.  Our first effort will be to refer you to a provider within your care system, and will utilize providers outside your care system as needed.

## 2022-07-30 NOTE — ED TRIAGE NOTES
Patient states she has been taking medications from a psychiatrist for Bipolar, she brought a bag, she has a bag of medications, Meth user, last use 6/20/22, in a recovery program.  She states she feels drunk, spacey and shouts at people. She states for about a week she has felt like this, today is the worse.  Her  is with her.

## 2022-09-14 ENCOUNTER — APPOINTMENT (OUTPATIENT)
Dept: ULTRASOUND IMAGING | Facility: CLINIC | Age: 32
End: 2022-09-14
Attending: EMERGENCY MEDICINE
Payer: MEDICARE

## 2022-09-14 ENCOUNTER — HOSPITAL ENCOUNTER (EMERGENCY)
Facility: CLINIC | Age: 32
Discharge: HOME | End: 2022-09-14
Attending: EMERGENCY MEDICINE | Admitting: EMERGENCY MEDICINE
Payer: MEDICARE

## 2022-09-14 VITALS
WEIGHT: 120 LBS | SYSTOLIC BLOOD PRESSURE: 125 MMHG | BODY MASS INDEX: 21.95 KG/M2 | TEMPERATURE: 98.5 F | HEART RATE: 74 BPM | DIASTOLIC BLOOD PRESSURE: 94 MMHG | OXYGEN SATURATION: 98 % | RESPIRATION RATE: 16 BRPM

## 2022-09-14 DIAGNOSIS — R10.2 PELVIC PAIN IN FEMALE: ICD-10-CM

## 2022-09-14 LAB
ALBUMIN UR-MCNC: NEGATIVE MG/DL
AMORPH CRY #/AREA URNS HPF: ABNORMAL /HPF
APPEARANCE UR: ABNORMAL
BACTERIA #/AREA URNS HPF: ABNORMAL /HPF
BILIRUB UR QL STRIP: NEGATIVE
CLUE CELLS: ABNORMAL
COLOR UR AUTO: YELLOW
GLUCOSE UR STRIP-MCNC: NEGATIVE MG/DL
HCG UR QL: NEGATIVE
HGB UR QL STRIP: NEGATIVE
KETONES UR STRIP-MCNC: NEGATIVE MG/DL
LEUKOCYTE ESTERASE UR QL STRIP: NEGATIVE
NITRATE UR QL: NEGATIVE
PH UR STRIP: 7 [PH] (ref 5–7)
RBC URINE: 0 /HPF
SP GR UR STRIP: 1.02 (ref 1–1.03)
SQUAMOUS EPITHELIAL: 2 /HPF
TRICHOMONAS, WET PREP: ABNORMAL
UROBILINOGEN UR STRIP-MCNC: <2 MG/DL
WBC URINE: 0 /HPF
WBC'S/HIGH POWER FIELD, WET PREP: ABNORMAL
YEAST, WET PREP: ABNORMAL

## 2022-09-14 PROCEDURE — 76830 TRANSVAGINAL US NON-OB: CPT

## 2022-09-14 PROCEDURE — 99285 EMERGENCY DEPT VISIT HI MDM: CPT | Mod: 25

## 2022-09-14 PROCEDURE — 81001 URINALYSIS AUTO W/SCOPE: CPT | Performed by: EMERGENCY MEDICINE

## 2022-09-14 PROCEDURE — 250N000011 HC RX IP 250 OP 636: Performed by: EMERGENCY MEDICINE

## 2022-09-14 PROCEDURE — 96372 THER/PROPH/DIAG INJ SC/IM: CPT | Performed by: EMERGENCY MEDICINE

## 2022-09-14 PROCEDURE — 87591 N.GONORRHOEAE DNA AMP PROB: CPT | Performed by: EMERGENCY MEDICINE

## 2022-09-14 PROCEDURE — 81025 URINE PREGNANCY TEST: CPT | Performed by: EMERGENCY MEDICINE

## 2022-09-14 PROCEDURE — 87210 SMEAR WET MOUNT SALINE/INK: CPT | Performed by: EMERGENCY MEDICINE

## 2022-09-14 PROCEDURE — 87491 CHLMYD TRACH DNA AMP PROBE: CPT | Performed by: EMERGENCY MEDICINE

## 2022-09-14 RX ORDER — KETOROLAC TROMETHAMINE 15 MG/ML
15 INJECTION, SOLUTION INTRAMUSCULAR; INTRAVENOUS ONCE
Status: DISCONTINUED | OUTPATIENT
Start: 2022-09-14 | End: 2022-09-14

## 2022-09-14 RX ORDER — KETOROLAC TROMETHAMINE 30 MG/ML
30 INJECTION, SOLUTION INTRAMUSCULAR; INTRAVENOUS ONCE
Status: COMPLETED | OUTPATIENT
Start: 2022-09-14 | End: 2022-09-14

## 2022-09-14 RX ADMIN — KETOROLAC TROMETHAMINE 30 MG: 30 INJECTION, SOLUTION INTRAMUSCULAR; INTRAVENOUS at 21:14

## 2022-09-14 ASSESSMENT — ENCOUNTER SYMPTOMS
HEADACHES: 1
CONFUSION: 0
NERVOUS/ANXIOUS: 1
EYE REDNESS: 0
ACTIVITY CHANGE: 1
ABDOMINAL PAIN: 0
DIFFICULTY URINATING: 0
SHORTNESS OF BREATH: 0
NECK STIFFNESS: 0
COLOR CHANGE: 0
ARTHRALGIAS: 0
FEVER: 0

## 2022-09-14 ASSESSMENT — ACTIVITIES OF DAILY LIVING (ADL): ADLS_ACUITY_SCORE: 35

## 2022-09-15 LAB
C TRACH DNA SPEC QL NAA+PROBE: NEGATIVE
N GONORRHOEA DNA SPEC QL NAA+PROBE: NEGATIVE

## 2022-09-15 NOTE — ED PROVIDER NOTES
EMERGENCY DEPARTMENT ENCOUNTER     NAME: Shannon Christopher   AGE: 32 year old female   YOB: 1990   MRN: [id number]   EVALUATION DATE & TIME: 9/14/2022  7:25 PM   PCP: Sveta Sandoval     Chief Complaint   Patient presents with     Pelvic Pain   :    FINAL IMPRESSION       1. Pelvic pain in female           ED COURSE & MEDICAL DECISION MAKING      Pertinent Labs & Imaging studies reviewed. (See chart for details)   32 year old female  presents to the Emergency Department for evaluation of intermittent pelvic pain for years, worse again the last 2 days.  She is status post hysterectomy.  On chart review I can see that she was last seen here for this in November 2021, and she notes that she has an appointment with OB/GYN for follow-up on the 26th. Initial Vitals Reviewed. Initial exam notable for patient who is quite anxious, but has a generally normal pelvic exam with no significant tenderness, only physiologic discharge.  I did do a wet prep to look for bacterial vaginosis, yeast, trichomonas, sent gonorrhea and Chlamydia testing  Less suspicious based on her exam.  I did an ultrasound to rule out something like obvious ovarian cyst or torsion and this is all negative.  She is status post hysterectomy with no concern for pregnancy related issues.  She was treated with some Toradol here but ultimately I think the next best step for her is outpatient follow-up with OB/GYN as scheduled for further testing of her intermittent ongoing pain.         7:29 PM I met with the patient, obtained history, performed an initial exam, and discussed options and plan for diagnostics and treatment here in the ED.    At the conclusion of the encounter I discussed the results of all of the tests and the disposition. The questions were answered. The patient or family acknowledged understanding and was agreeable with the care plan.         MEDICATIONS GIVEN IN THE EMERGENCY:   Medications - No data to display   NEW  PRESCRIPTIONS STARTED AT TODAY'S ER VISIT   New Prescriptions    No medications on file     ================================================================   HISTORY OF PRESENT ILLNESS       Patient information was obtained from: Patient  Use of Intrepreter: N/A     Shannon Christopher is a 32 year old female with history of PMDD, pelvic pain,  s/p laparoscopic hysterecomy who presents with pelvic pain.     The patient reports that since 9/12 ( 2 days), she had developed a flare up of pelvic pain. She endorses pain in her pelvic area bilaterally as well as a headache. Her pain is so severe that it has made it difficult for her to get out of bed.  She states she has been trying to eat better and do better without relief of her symptoms.  She states she is also scared and frustrated that no one has been able to pinpoint her pain  for the past few years.       She reports a history of PMDD and s/p partial hysterectomy. No other complaints at this time.    ================================================================    REVIEW OF SYSTEMS       Review of Systems   Constitutional: Positive for activity change. Negative for fever.   HENT: Negative for congestion.    Eyes: Negative for redness.   Respiratory: Negative for shortness of breath.    Cardiovascular: Negative for chest pain.   Gastrointestinal: Negative for abdominal pain.   Genitourinary: Positive for pelvic pain. Negative for difficulty urinating.   Musculoskeletal: Negative for arthralgias and neck stiffness.   Skin: Negative for color change.   Neurological: Positive for headaches.   Psychiatric/Behavioral: Negative for confusion. The patient is nervous/anxious.    All other systems reviewed and are negative.       PAST HISTORY     PAST MEDICAL HISTORY:   Past Medical History:   Diagnosis Date     ADD (attention deficit disorder)      Anxiety      Borderline mental retardation      Borderline personality disorder (H)      Cerebral palsy (H)      Depression       PONV (postoperative nausea and vomiting)      Uterine fibroid       PAST SURGICAL HISTORY:   Past Surgical History:   Procedure Laterality Date     ADENOIDECTOMY       APPLY EXTERNAL FIXATOR LOWER EXTREMITY Left 5/18/2021    Procedure: CLOSED REDUCTION EXTERNAL FIXATION, LEFT ANKLE FRACTURE;  Surgeon: Dereck Nielson DO;  Location: [facility];  Service: Orthopedics     BUNIONECTOMY       EYE MUSCLE SURGERY       FOOT CAPSULE RELEASE W/ PERCUTANEOUS HEEL CORD LENGTHENING, TIBIAL TENDON TRANSFER Right      LAPAROSCOPIC HYSTERECTOMY N/A 1/4/2019    Procedure: ROBOTIC TOTAL LAPAROSCOPIC HYSTERECTOMY, BILATERAL SALPINGECTOMY LYSIS OF ADHESIONS;  Surgeon: Jose Golden MD;  Location: [facility] OR;  Service: Gynecology     OPEN REDUCTION INTERNAL FIXATION ANKLE Left 5/28/2021    Procedure: WITH OPEN REDUCTION INTERNAL FIXATION, TRIMALLEOLAR, LEFT ANKLE;  Surgeon: Dereck Costa DO;  Location: [facility] OR;  Service: Orthopedics     REMOVE HARDWARE LOWER EXTREMITY Left 5/28/2021    Procedure: LEFT ANKLE EXTERNAL FIXATION REMOVAL;  Surgeon: Dereck Costa DO;  Location: [facility] OR;  Service: Orthopedics     TYMPANOPLASTY        CURRENT MEDICATIONS:   acetaminophen (TYLENOL) 500 MG tablet  aspirin 81 MG EC tablet  divalproex (DEPAKOTE ER) 500 MG 24 hour tablet  estradioL (ESTRACE) 2 MG tablet  HYDROmorphone (DILAUDID) 2 MG tablet  hydrOXYzine HCL (ATARAX) 25 MG tablet  ketorolac (TORADOL) 10 mg tablet  LATUDA 60 mg Tab tablet  nystatin (MYCOSTATIN) cream  OLANZapine (ZYPREXA) 10 MG tablet  OLANZapine (ZYPREXA) 7.5 MG tablet  polyethylene glycol (MIRALAX) 17 gram packet  senna-docusate (PERICOLACE) 8.6-50 mg tablet  zonisamide (ZONEGRAN) 100 MG capsule      ALLERGIES:   No Known Allergies   FAMILY HISTORY:   No family history on file.   SOCIAL HISTORY:   Social History     Socioeconomic History     Marital status: Single   Tobacco Use     Smoking status: Current Some Day  Smoker     Smokeless tobacco: Never Used     Tobacco comment: < 1 PPW   Substance and Sexual Activity     Alcohol use: Yes     Comment: Alcoholic Drinks/day: rarely     Drug use: No   Social History Narrative    Patient arrived alone to the ED 08/13/17          VITALS  Patient Vitals for the past 24 hrs:   BP Temp Temp src Pulse Resp SpO2 Weight   09/14/22 1914 (!) 125/94 98.5  F (36.9  C) Temporal 74 16 98 % 54.4 kg (120 lb)        ================================================================    PHYSICAL EXAM     VITAL SIGNS: BP (!) 125/94   Pulse 74   Temp 98.5  F (36.9  C) (Temporal)   Resp 16   Wt 54.4 kg (120 lb)   SpO2 98%   BMI 21.95 kg/m     Constitutional:  Awake, no acute distress   HENT:  Atraumatic, oropharynx without exudate or erythema, membranes moist  Lymph:  No adenopathy  Eyes: EOM intact, PERRL, no injection  Neck: Supple  Respiratory:  Clear to auscultation bilaterally, no wheezes or crackles   Cardiovascular:  Regular rate and rhythm, single S1 and S2   GI:  Soft, nontender, nondistended, no rebound or guarding   Musculoskeletal:  Moves all extremities, no lower extremity edema, no deformities. Pelvic normal. No CMT.  :  White vaginal discharge.   Skin:  Warm, dry  Neurologic:  Alert and oriented x3, no focal deficits noted       ================================================================  LAB       All pertinent labs reviewed and interpreted.   Labs Ordered and Resulted from Time of ED Arrival to Time of ED Departure   ROUTINE UA WITH MICROSCOPIC REFLEX TO CULTURE - Abnormal       Result Value    Color Urine Yellow      Appearance Urine Cloudy (*)     Glucose Urine Negative      Bilirubin Urine Negative      Ketones Urine Negative      Specific Gravity Urine 1.017      Blood Urine Negative      pH Urine 7.0      Protein Albumin Urine Negative      Urobilinogen Urine <2.0      Nitrite Urine Negative      Leukocyte Esterase Urine Negative      Bacteria Urine Few (*)     Amorphous  Crystals Urine Moderate (*)     RBC Urine 0      WBC Urine 0      Squamous Epithelials Urine 2 (*)    WET PREPARATION - Abnormal    Trichomonas Absent      Yeast Absent      Clue Cells Absent      WBCs/high power field 2+ (*)    HCG QUALITATIVE URINE - Normal    hCG Urine Qualitative Negative     NEISSERIA GONORRHOEAE PCR   CHLAMYDIA TRACHOMATIS PCR        ===============================================================  RADIOLOGY       Reviewed all pertinent imaging. Please see official radiology report.   US Pelvic Complete with Transvaginal   Final Result   IMPRESSION:   1.  Probable right corpus luteal cyst.   2.  Small amount of free fluid in the pelvis may be physiologic.   3.  Hysterectomy.                     ================================================================  EKG         I have independently reviewed and interpreted the EKG(s) documented above.     ================================================================  PROCEDURES         I, Jeri Diaz , am serving as a scribe to document services personally performed by Dr. Solano based on my observation and the provider's statements to me. I, Marion Solano MD attest that Jeri Diaz  is acting in a scribe capacity, has observed my performance of the services and has documented them in accordance with my direction.     Marion Solano M.D.   Emergency Medicine   [facility] EMERGENCY ROOM  [address] [address]  [phone]  Dept: [phone]      Marion Solano MD  09/14/22 7026

## 2022-09-15 NOTE — DISCHARGE INSTRUCTIONS
All of the test today are negative including the urine test, the swab looking for infections, and the ultrasound.  As we discussed, the next step is for you to see the outpatient OB/GYN doctors to have a larger toolbox of testing to help determine the cause of your pain.

## 2022-09-15 NOTE — ED TRIAGE NOTES
"Pt presents to the ED with c/o pelvic pain that started two days ago, but has gotten much worse in the past 11 hours. No bleeding. Has very bad pains. C/o discharge that is clear that is sometimes \"chunks\". Today the pain has been constant. Has an appointment with GYN on the 26th, but cant handle the pain.      Triage Assessment     Row Name 09/14/22 1916       Triage Assessment (Adult)    Airway WDL WDL       Respiratory WDL    Respiratory WDL WDL       Skin Circulation/Temperature WDL    Skin Circulation/Temperature WDL WDL       Cardiac WDL    Cardiac WDL WDL       Peripheral/Neurovascular WDL    Peripheral Neurovascular WDL WDL       Cognitive/Neuro/Behavioral WDL    Cognitive/Neuro/Behavioral WDL WDL              "

## 2022-09-22 ENCOUNTER — LAB REQUISITION (OUTPATIENT)
Dept: LAB | Facility: CLINIC | Age: 32
End: 2022-09-22
Payer: MEDICARE

## 2022-09-22 DIAGNOSIS — Z11.3 ENCOUNTER FOR SCREENING FOR INFECTIONS WITH A PREDOMINANTLY SEXUAL MODE OF TRANSMISSION: ICD-10-CM

## 2022-09-22 DIAGNOSIS — R87.619 UNSPECIFIED ABNORMAL CYTOLOGICAL FINDINGS IN SPECIMENS FROM CERVIX UTERI: ICD-10-CM

## 2022-09-22 LAB
HCV AB SERPL QL IA: NONREACTIVE
T PALLIDUM AB SER QL: NONREACTIVE

## 2022-09-22 PROCEDURE — 87389 HIV-1 AG W/HIV-1&-2 AB AG IA: CPT | Mod: ORL | Performed by: OBSTETRICS & GYNECOLOGY

## 2022-09-22 PROCEDURE — 87591 N.GONORRHOEAE DNA AMP PROB: CPT | Mod: ORL | Performed by: OBSTETRICS & GYNECOLOGY

## 2022-09-22 PROCEDURE — 87340 HEPATITIS B SURFACE AG IA: CPT | Mod: ORL | Performed by: OBSTETRICS & GYNECOLOGY

## 2022-09-22 PROCEDURE — 86780 TREPONEMA PALLIDUM: CPT | Mod: ORL | Performed by: OBSTETRICS & GYNECOLOGY

## 2022-09-22 PROCEDURE — G0145 SCR C/V CYTO,THINLAYER,RESCR: HCPCS | Mod: ORL | Performed by: OBSTETRICS & GYNECOLOGY

## 2022-09-22 PROCEDURE — 87624 HPV HI-RISK TYP POOLED RSLT: CPT | Mod: ORL | Performed by: OBSTETRICS & GYNECOLOGY

## 2022-09-22 PROCEDURE — 86803 HEPATITIS C AB TEST: CPT | Mod: ORL | Performed by: OBSTETRICS & GYNECOLOGY

## 2022-09-23 LAB
C TRACH DNA SPEC QL PROBE+SIG AMP: NEGATIVE
HBV SURFACE AG SERPL QL IA: NONREACTIVE
HIV 1+2 AB+HIV1 P24 AG SERPL QL IA: NONREACTIVE
N GONORRHOEA DNA SPEC QL NAA+PROBE: NEGATIVE

## 2022-09-26 LAB
BKR LAB AP GYN ADEQUACY: NORMAL
BKR LAB AP GYN INTERPRETATION: NORMAL
BKR LAB AP HPV REFLEX: NORMAL
BKR LAB AP LMP: NORMAL
BKR LAB AP PREVIOUS ABNL DX: NORMAL
BKR LAB AP PREVIOUS ABNORMAL: NORMAL
PATH REPORT.COMMENTS IMP SPEC: NORMAL
PATH REPORT.COMMENTS IMP SPEC: NORMAL
PATH REPORT.RELEVANT HX SPEC: NORMAL

## 2022-09-28 ENCOUNTER — LAB REQUISITION (OUTPATIENT)
Dept: LAB | Facility: CLINIC | Age: 32
End: 2022-09-28
Payer: MEDICARE

## 2022-09-28 DIAGNOSIS — Z13.0 ENCOUNTER FOR SCREENING FOR DISEASES OF THE BLOOD AND BLOOD-FORMING ORGANS AND CERTAIN DISORDERS INVOLVING THE IMMUNE MECHANISM: ICD-10-CM

## 2022-09-28 DIAGNOSIS — Z13.29 ENCOUNTER FOR SCREENING FOR OTHER SUSPECTED ENDOCRINE DISORDER: ICD-10-CM

## 2022-09-28 DIAGNOSIS — Z13.1 ENCOUNTER FOR SCREENING FOR DIABETES MELLITUS: ICD-10-CM

## 2022-09-28 DIAGNOSIS — Z13.220 ENCOUNTER FOR SCREENING FOR LIPOID DISORDERS: ICD-10-CM

## 2022-09-28 LAB
CHOLEST SERPL-MCNC: 194 MG/DL
FASTING STATUS PATIENT QL REPORTED: YES
GLUCOSE SERPL-MCNC: 92 MG/DL (ref 70–99)
HDLC SERPL-MCNC: 41 MG/DL
HGB BLD-MCNC: 13.9 G/DL (ref 11.7–15.7)
HOLD SPECIMEN: NORMAL
LDLC SERPL CALC-MCNC: 122 MG/DL
NONHDLC SERPL-MCNC: 153 MG/DL
TRIGL SERPL-MCNC: 154 MG/DL
TSH SERPL DL<=0.005 MIU/L-ACNC: 1.88 UIU/ML (ref 0.3–4.2)

## 2022-09-28 PROCEDURE — 82947 ASSAY GLUCOSE BLOOD QUANT: CPT | Mod: ORL | Performed by: OBSTETRICS & GYNECOLOGY

## 2022-09-28 PROCEDURE — 85018 HEMOGLOBIN: CPT | Mod: ORL | Performed by: OBSTETRICS & GYNECOLOGY

## 2022-09-28 PROCEDURE — 80061 LIPID PANEL: CPT | Mod: ORL | Performed by: OBSTETRICS & GYNECOLOGY

## 2022-09-28 PROCEDURE — 84443 ASSAY THYROID STIM HORMONE: CPT | Mod: ORL | Performed by: OBSTETRICS & GYNECOLOGY

## 2022-09-29 LAB
HUMAN PAPILLOMA VIRUS 16 DNA: NEGATIVE
HUMAN PAPILLOMA VIRUS 18 DNA: NEGATIVE
HUMAN PAPILLOMA VIRUS FINAL DIAGNOSIS: NORMAL
HUMAN PAPILLOMA VIRUS OTHER HR: NEGATIVE

## 2023-02-10 ENCOUNTER — APPOINTMENT (OUTPATIENT)
Dept: MRI IMAGING | Facility: CLINIC | Age: 33
End: 2023-02-10
Attending: EMERGENCY MEDICINE
Payer: MEDICARE

## 2023-02-10 ENCOUNTER — HOSPITAL ENCOUNTER (EMERGENCY)
Facility: CLINIC | Age: 33
Discharge: HOME | End: 2023-02-10
Attending: EMERGENCY MEDICINE | Admitting: EMERGENCY MEDICINE
Payer: MEDICARE

## 2023-02-10 VITALS
RESPIRATION RATE: 16 BRPM | DIASTOLIC BLOOD PRESSURE: 67 MMHG | WEIGHT: 128.5 LBS | OXYGEN SATURATION: 98 % | BODY MASS INDEX: 23.65 KG/M2 | SYSTOLIC BLOOD PRESSURE: 116 MMHG | TEMPERATURE: 97.4 F | HEART RATE: 63 BPM | HEIGHT: 62 IN

## 2023-02-10 DIAGNOSIS — R26.89 BALANCE PROBLEMS: ICD-10-CM

## 2023-02-10 DIAGNOSIS — D69.6 THROMBOCYTOPENIA (H): ICD-10-CM

## 2023-02-10 DIAGNOSIS — R41.3 MEMORY PROBLEM: ICD-10-CM

## 2023-02-10 LAB
ANION GAP SERPL CALCULATED.3IONS-SCNC: 9 MMOL/L (ref 5–18)
ATRIAL RATE - MUSE: 59 BPM
BUN SERPL-MCNC: 11 MG/DL (ref 8–22)
CALCIUM SERPL-MCNC: 9.6 MG/DL (ref 8.5–10.5)
CHLORIDE BLD-SCNC: 108 MMOL/L (ref 98–107)
CO2 SERPL-SCNC: 23 MMOL/L (ref 22–31)
CREAT SERPL-MCNC: 0.76 MG/DL (ref 0.6–1.1)
DIASTOLIC BLOOD PRESSURE - MUSE: NORMAL MMHG
ERYTHROCYTE [DISTWIDTH] IN BLOOD BY AUTOMATED COUNT: 12.5 % (ref 10–15)
GFR SERPL CREATININE-BSD FRML MDRD: >90 ML/MIN/1.73M2
GLUCOSE BLD-MCNC: 76 MG/DL (ref 70–125)
HCG SERPL QL: NEGATIVE
HCT VFR BLD AUTO: 44 % (ref 35–47)
HGB BLD-MCNC: 14.8 G/DL (ref 11.7–15.7)
INTERPRETATION ECG - MUSE: NORMAL
MAGNESIUM SERPL-MCNC: 1.9 MG/DL (ref 1.8–2.6)
MCH RBC QN AUTO: 29.9 PG (ref 26.5–33)
MCHC RBC AUTO-ENTMCNC: 33.6 G/DL (ref 31.5–36.5)
MCV RBC AUTO: 89 FL (ref 78–100)
P AXIS - MUSE: 47 DEGREES
PLAT MORPH BLD: NORMAL
PLATELET # BLD AUTO: 123 10E3/UL (ref 150–450)
POTASSIUM BLD-SCNC: 3.8 MMOL/L (ref 3.5–5)
PR INTERVAL - MUSE: 152 MS
QRS DURATION - MUSE: 76 MS
QT - MUSE: 434 MS
QTC - MUSE: 429 MS
R AXIS - MUSE: 75 DEGREES
RBC # BLD AUTO: 4.95 10E6/UL (ref 3.8–5.2)
RBC MORPH BLD: NORMAL
SODIUM SERPL-SCNC: 140 MMOL/L (ref 136–145)
SYSTOLIC BLOOD PRESSURE - MUSE: NORMAL MMHG
T AXIS - MUSE: 30 DEGREES
TROPONIN I SERPL-MCNC: <0.01 NG/ML (ref 0–0.29)
VENTRICULAR RATE- MUSE: 59 BPM
WBC # BLD AUTO: 6.1 10E3/UL (ref 4–11)

## 2023-02-10 PROCEDURE — A9585 GADOBUTROL INJECTION: HCPCS | Performed by: EMERGENCY MEDICINE

## 2023-02-10 PROCEDURE — 85027 COMPLETE CBC AUTOMATED: CPT | Performed by: EMERGENCY MEDICINE

## 2023-02-10 PROCEDURE — 99285 EMERGENCY DEPT VISIT HI MDM: CPT | Mod: 25

## 2023-02-10 PROCEDURE — 36415 COLL VENOUS BLD VENIPUNCTURE: CPT | Performed by: EMERGENCY MEDICINE

## 2023-02-10 PROCEDURE — 83735 ASSAY OF MAGNESIUM: CPT | Performed by: EMERGENCY MEDICINE

## 2023-02-10 PROCEDURE — 96374 THER/PROPH/DIAG INJ IV PUSH: CPT | Mod: 59

## 2023-02-10 PROCEDURE — 84703 CHORIONIC GONADOTROPIN ASSAY: CPT | Performed by: EMERGENCY MEDICINE

## 2023-02-10 PROCEDURE — 255N000002 HC RX 255 OP 636: Performed by: EMERGENCY MEDICINE

## 2023-02-10 PROCEDURE — 93005 ELECTROCARDIOGRAM TRACING: CPT | Performed by: EMERGENCY MEDICINE

## 2023-02-10 PROCEDURE — 84484 ASSAY OF TROPONIN QUANT: CPT | Performed by: EMERGENCY MEDICINE

## 2023-02-10 PROCEDURE — G1010 CDSM STANSON: HCPCS

## 2023-02-10 PROCEDURE — 250N000011 HC RX IP 250 OP 636: Performed by: EMERGENCY MEDICINE

## 2023-02-10 PROCEDURE — 80048 BASIC METABOLIC PNL TOTAL CA: CPT | Performed by: EMERGENCY MEDICINE

## 2023-02-10 RX ORDER — GADOBUTROL 604.72 MG/ML
7.5 INJECTION INTRAVENOUS ONCE
Status: COMPLETED | OUTPATIENT
Start: 2023-02-10 | End: 2023-02-10

## 2023-02-10 RX ORDER — LORAZEPAM 2 MG/ML
1 INJECTION INTRAMUSCULAR ONCE
Status: COMPLETED | OUTPATIENT
Start: 2023-02-10 | End: 2023-02-10

## 2023-02-10 RX ADMIN — GADOBUTROL 6 ML: 604.72 INJECTION INTRAVENOUS at 17:14

## 2023-02-10 RX ADMIN — LORAZEPAM 1 MG: 2 INJECTION INTRAMUSCULAR; INTRAVENOUS at 16:37

## 2023-02-10 ASSESSMENT — ACTIVITIES OF DAILY LIVING (ADL)
ADLS_ACUITY_SCORE: 35
ADLS_ACUITY_SCORE: 35

## 2023-02-10 ASSESSMENT — ENCOUNTER SYMPTOMS
HEADACHES: 1
SLEEP DISTURBANCE: 1
FACIAL ASYMMETRY: 0
TREMORS: 1
AGITATION: 1

## 2023-02-10 ASSESSMENT — VISUAL ACUITY
OS: 20/40;WITHOUT CORRECTIVE LENSES
OD: 20/30;WITHOUT CORRECTIVE LENSES

## 2023-02-10 NOTE — ED PROVIDER NOTES
EMERGENCY DEPARTMENT ENCOUNTER      NAME: Shannon Christopher  AGE: 32 year old female  YOB: 1990  MRN: [id number]  EVALUATION DATE & TIME: 2/10/2023  2:02 PM    PCP: Sveta Sandoval    ED PROVIDER: Kervin Ferguson MD        Chief Complaint   Patient presents with     Altered Mental Status     Headache         FINAL IMPRESSION:  1. Thrombocytopenia (H)    2. Memory problem    3. Balance problems          ED COURSE & MEDICAL DECISION MAKING:    Pertinent Labs & Imaging studies reviewed. (See chart for details)  32 year old female presents to the Emergency Department for evaluation of several weeks of on and off headache, memory problems, balance issues, questionable facial droop, and possible vision loss since not currently present    Patient's mother does not notice any facial discrepancy and states patient's gait is at baseline    Patient has a past medical history of cerebral palsy effecting right side of body, anxiety, intellectual disability, borderline personality disorder, depression, bipolar disorder    Based on timing of symptoms stroke code not initiated    Differential includes stroke, tumor, hyponatremia, medication side effect, anxiety, conversion disorder, complex migraine, multiple sclerosis    Nobody else at home is symptoms therefore, and oxide poisoning unlikely.  Based on exam botulism unlikely.  Based on history and exam serotonin syndrome unlikely.    Patient's headaches 2 out of 10.    Visual acuity is 20/30 in 20/40    Glaucoma, optic neuritis unlikely cause of patient's symptoms    Patient's unfortunately in a hallway bed which states makes him a bit more anxious but is declining my offer for anxiolysis medication    Depakote toxicity unlikely    Patient reports roommates have not noticed anything different with her.    Patient reports she is worried rectifying things wrong with her.    Plan for MRI, EKG, BMP, CBC    Patient reports there is no way she is pregnant and had a partial  hysterectomy.    I did review patient's urgent care note from earlier today.         ED Course as of 02/10/23 1755   Fri Feb 10, 2023   1633 HCG Qualitative Serum: Negative   1633 Magnesium: 1.9   1633 WBC: 6.1   1633 Hemoglobin: 14.8   1633 Platelet Count(!): 123  Very mild thrombocytopenia likely normal variant recommend repeat CBC in 1 month   1753 Patient scheduled to see her primary care doctor on the 17th 1753 MRI does not show stroke, tumor, multiple sclerosis.   1754 Discussed the patient recommend she follow-up with her doctor as scheduled on the 17th of this month but order additional test.  Recommend return to the ER for worsening symptoms   1754 Patient and patient's mother agree with plan       2:27 PM I met with the patient, obtained history, performed an initial exam, and discussed options and plan for diagnostics and treatment here in the ED.   3:39 PM Patient wants something for anxiety.    Medical Decision Making    History:    Supplemental history from: Documented in chart, if applicable and Family Member/Significant Other    External Record(s) reviewed: Documented in chart, if applicable.    Work Up:    Chart documentation includes differential considered and any EKGs or imaging independently interpreted by provider, where specified.    In additional to work up documented, I considered the following work up: Documented in chart, if applicable.    External consultation:    Discussion of management with another provider: Documented in chart, if applicable    Complicating factors:    Care impacted by chronic illness: N/A and Mental Health    Care affected by social determinants of health: N/A    Disposition considerations: Discharge. No recommendations on prescription strength medication(s). N/A.            At the conclusion of the encounter I discussed the results of all of the tests and the disposition. The questions were answered. The patient or family acknowledged understanding and was  "agreeable with the care plan.       MEDICATIONS GIVEN IN THE EMERGENCY:  Medications   LORazepam (ATIVAN) injection 1 mg (1 mg Intravenous Given 2/10/23 1637)   gadobutrol (GADAVIST) injection 7.5 mL (6 mLs Intravenous Given 2/10/23 1714)       NEW PRESCRIPTIONS STARTED AT TODAY'S ER VISIT  New Prescriptions    No medications on file          =================================================================    HPI    Triage note  \"Patient presents to the ED with complaints of headache on and off for the last several weeks. She also reports some memory loss, balance issues, and a possible facial droop. In triage she is alert and oriented x 4, has no extremity weakness or facial droop. She also endorses loss of visual acuity.      \"      Patient information was obtained from: Patient and mother    Use of : N/A         Shannon Christopher is a 32 year old female with a pertinent history of cerebral palsy, BPD, and borderline intellectual disability, who presents to this ED via walk-in with her mother for evaluation of altered mental status and a headache.    Patient reports memory loss and a headache to her temples bilaterally. Tylenol does not help. She reports balance issues and agitation. She notes she was seen at [facility] earlier today, and the PA commented on a potential right side of mouth droop, and patient notes she has been drooling a bit on that side when laying down. Mother denies her face is different than normal. Patient reports the memory issues have been ongoing for a few weeks now, and the headache started around the same. The headache comes and goes, nothing seems to make it better or worse. Since symptom onset, she was started on metformin. No medication changes prior to symptom onset. She notes the balance has been an issue for more than 2 weeks. Patient denies getting a good nights sleep recently. Patient has roommates, but she denies any of them have similar symptoms and they haven't " "commented on her acting abnormal. She is not currently working. Mother notes the patient has cerebral palsy, which affects her right side.     No alcohol or drug use. No bleeding. No diarrhea. PCP has not been seen recently. Patient notes she is sensitive to loud noises. Headache is currently a 2/10. She denies she is usually tremulous. Patient reports her vision \"sometimes gets stuck,\" where she sometimes cannot more either of her eyes. Patient denies any other current complaints.      REVIEW OF SYSTEMS   Review of Systems   Neurological: Positive for tremors and headaches. Negative for facial asymmetry.        Positive for balance issues.   Psychiatric/Behavioral: Positive for agitation and sleep disturbance.        PAST MEDICAL HISTORY:  Past Medical History:   Diagnosis Date     ADD (attention deficit disorder)      Anxiety      Borderline mental retardation      Borderline personality disorder (H)      Cerebral palsy (H)      Depression      PONV (postoperative nausea and vomiting)      Uterine fibroid        PAST SURGICAL HISTORY:  Past Surgical History:   Procedure Laterality Date     ADENOIDECTOMY       APPLY EXTERNAL FIXATOR LOWER EXTREMITY Left 5/18/2021    Procedure: CLOSED REDUCTION EXTERNAL FIXATION, LEFT ANKLE FRACTURE;  Surgeon: Dereck Nielson DO;  Location: [facility];  Service: Orthopedics     BUNIONECTOMY       EYE MUSCLE SURGERY       FOOT CAPSULE RELEASE W/ PERCUTANEOUS HEEL CORD LENGTHENING, TIBIAL TENDON TRANSFER Right      LAPAROSCOPIC HYSTERECTOMY N/A 1/4/2019    Procedure: ROBOTIC TOTAL LAPAROSCOPIC HYSTERECTOMY, BILATERAL SALPINGECTOMY LYSIS OF ADHESIONS;  Surgeon: Jose Golden MD;  Location: [facility] OR;  Service: Gynecology     OPEN REDUCTION INTERNAL FIXATION ANKLE Left 5/28/2021    Procedure: WITH OPEN REDUCTION INTERNAL FIXATION, TRIMALLEOLAR, LEFT ANKLE;  Surgeon: Dereck Costa DO;  Location: [facility] OR;  Service: Orthopedics     REMOVE " "HARDWARE LOWER EXTREMITY Left 5/28/2021    Procedure: LEFT ANKLE EXTERNAL FIXATION REMOVAL;  Surgeon: Dereck Costa DO;  Location: [facility] OR;  Service: Orthopedics     TYMPANOPLASTY             CURRENT MEDICATIONS:    acetaminophen (TYLENOL) 500 MG tablet  aspirin 81 MG EC tablet  divalproex (DEPAKOTE ER) 500 MG 24 hour tablet  estradioL (ESTRACE) 2 MG tablet  HYDROmorphone (DILAUDID) 2 MG tablet  hydrOXYzine HCL (ATARAX) 25 MG tablet  ketorolac (TORADOL) 10 mg tablet  LATUDA 60 mg Tab tablet  nystatin (MYCOSTATIN) cream  OLANZapine (ZYPREXA) 10 MG tablet  OLANZapine (ZYPREXA) 7.5 MG tablet  polyethylene glycol (MIRALAX) 17 gram packet  senna-docusate (PERICOLACE) 8.6-50 mg tablet  zonisamide (ZONEGRAN) 100 MG capsule        ALLERGIES:  No Known Allergies    FAMILY HISTORY:  No family history on file.    SOCIAL HISTORY:   Social History     Socioeconomic History     Marital status: Single   Tobacco Use     Smoking status: Some Days     Smokeless tobacco: Never     Tobacco comments:     < 1 PPW   Substance and Sexual Activity     Alcohol use: Yes     Comment: Alcoholic Drinks/day: rarely     Drug use: No   Social History Narrative    Patient arrived alone to the ED 08/13/17         VITALS:  /87   Pulse 68   Temp 97.4  F (36.3  C) (Oral)   Resp 16   Ht 1.575 m (5' 2\")   Wt 58.3 kg (128 lb 8 oz)   SpO2 100%   BMI 23.50 kg/m      PHYSICAL EXAM      Vitals: /87   Pulse 68   Temp 97.4  F (36.3  C) (Oral)   Resp 16   Ht 1.575 m (5' 2\")   Wt 58.3 kg (128 lb 8 oz)   SpO2 100%   BMI 23.50 kg/m    General: Appears in no acute distress, awake, alert, interactive.  Constantly drinking water  Eyes: Conjunctivae non-injected. Sclera anicteric.  HENT: Atraumatic.  No nystagmus.  Pupils equal round and reactive  Neck: Supple.  Respiratory/Chest: Respiration unlabored.  Heart: Regular rate and rhythm  Abdomen: non distended  Musculoskeletal:  No edema or erythema.  Steady gait.  Gait is " abnormal.  Mother states this is her normal gait.  Skin: Normal color. No rash or diaphoresis.  Neurologic: Face symmetric (mother also does not notice any asymmetry), moves all extremities spontaneously. Speech clear.  No clonus.  When holding arms extended her hands are mildly tremulous bilaterally.    Psychiatric: Oriented to person, place, and time.  Anxious       LAB:  All pertinent labs reviewed and interpreted.  Results for orders placed or performed during the hospital encounter of 02/10/23   MR Brain w/o & w Contrast    Impression    IMPRESSION:  1.  Normal head MRI.   Basic metabolic panel   Result Value Ref Range    Sodium 140 136 - 145 mmol/L    Potassium 3.8 3.5 - 5.0 mmol/L    Chloride 108 (H) 98 - 107 mmol/L    Carbon Dioxide (CO2) 23 22 - 31 mmol/L    Anion Gap 9 5 - 18 mmol/L    Urea Nitrogen 11 8 - 22 mg/dL    Creatinine 0.76 0.60 - 1.10 mg/dL    Calcium 9.6 8.5 - 10.5 mg/dL    Glucose 76 70 - 125 mg/dL    GFR Estimate >90 >60 mL/min/1.73m2   CBC (+ platelets, no diff)   Result Value Ref Range    WBC Count 6.1 4.0 - 11.0 10e3/uL    RBC Count 4.95 3.80 - 5.20 10e6/uL    Hemoglobin 14.8 11.7 - 15.7 g/dL    Hematocrit 44.0 35.0 - 47.0 %    MCV 89 78 - 100 fL    MCH 29.9 26.5 - 33.0 pg    MCHC 33.6 31.5 - 36.5 g/dL    RDW 12.5 10.0 - 15.0 %    Platelet Count 123 (L) 150 - 450 10e3/uL   HCG QUALitative pregnancy (blood)   Result Value Ref Range    hCG Serum Qualitative Negative Negative   Result Value Ref Range    Magnesium 1.9 1.8 - 2.6 mg/dL   Troponin I (now)   Result Value Ref Range    Troponin I <0.01 0.00 - 0.29 ng/mL   RBC and Platelet Morphology   Result Value Ref Range    Platelet Assessment  Automated Count Confirmed. Platelet morphology is normal.     Automated Count Confirmed. Platelet morphology is normal.    RBC Morphology Confirmed RBC Indices        RADIOLOGY:  Reviewed all pertinent imaging. Please see official radiology report.  MR Brain w/o & w Contrast   Final Result   IMPRESSION:    1.  Normal head MRI.          EKG:    Performed at: 15:36    Impression: sinus bradycardia. Nonspecific T wave abnormality.    Rate: 59 BPM  Rhythm: sinus bradycardia  Axis: 75  AR Interval: 152ms  QRS Interval: 76 ms  QTc Interval: 429 ms  ST Changes: No ST changes.  Comparison: When compared with ECG of 23-APR-2020 08:32, similar nonspecific T waves in anterior leads.    I have independently reviewed and interpreted the EKG(s) documented above.    PROCEDURES:       I, Nupur Morrell, am serving as a scribe to document services personally performed by Gaston Ferguson MD based on my observation and the provider's statements to me. I, Dr. Gaston Ferguson, attest that Nupur Morrell is acting in a scribe capacity, has observed my performance of the services and has documented them in accordance with my direction.    Gaston Ferguson MD  Emergency Medicine  [facility] EMERGENCY ROOM  [address] [address]  [phone]     Gaston Ferguson MD  02/10/23 2317

## 2023-02-10 NOTE — DISCHARGE INSTRUCTIONS
Good news your MRI does not show evidence of tumor or stroke or multiple sclerosis.  Your blood test do not show  Severe symptoms    Your platelets are slightly low which is likely normal and nothing to worry about but would recommend repeat platelet check with your doctor.  Recommend follow-up with your primary care doctor who can check some vitamin levels that could cause your symptoms as well additional test that we cannot do in the ER.  Recommend follow-up with an eye doctor as well.

## 2023-02-10 NOTE — ED TRIAGE NOTES
Patient presents to the ED with complaints of headache on and off for the last several weeks. She also reports some memory loss, balance issues, and a possible facial droop. In triage she is alert and oriented x 4, has no extremity weakness or facial droop. She also endorses loss of visual acuity.
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
## 2023-05-30 ENCOUNTER — HOSPITAL ENCOUNTER (EMERGENCY)
Facility: CLINIC | Age: 33
Discharge: HOME | End: 2023-05-30
Attending: EMERGENCY MEDICINE | Admitting: EMERGENCY MEDICINE
Payer: MEDICARE

## 2023-05-30 ENCOUNTER — APPOINTMENT (OUTPATIENT)
Dept: RADIOLOGY | Facility: CLINIC | Age: 33
End: 2023-05-30
Attending: EMERGENCY MEDICINE
Payer: MEDICARE

## 2023-05-30 VITALS
HEART RATE: 76 BPM | DIASTOLIC BLOOD PRESSURE: 71 MMHG | SYSTOLIC BLOOD PRESSURE: 112 MMHG | OXYGEN SATURATION: 100 % | RESPIRATION RATE: 16 BRPM
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
## 2023-05-30 DIAGNOSIS — S39.012A BACK STRAIN, INITIAL ENCOUNTER: ICD-10-CM

## 2023-05-30 PROCEDURE — 72100 X-RAY EXAM L-S SPINE 2/3 VWS: CPT

## 2023-05-30 PROCEDURE — 99283 EMERGENCY DEPT VISIT LOW MDM: CPT

## 2023-05-30 PROCEDURE — 250N000013 HC RX MED GY IP 250 OP 250 PS 637: Performed by: EMERGENCY MEDICINE

## 2023-05-30 RX ORDER — IBUPROFEN 400 MG/1
400 TABLET, FILM COATED ORAL ONCE
Status: COMPLETED | OUTPATIENT
Start: 2023-05-30 | End: 2023-05-30

## 2023-05-30 RX ORDER — CYCLOBENZAPRINE HCL 10 MG
10 TABLET ORAL 3 TIMES DAILY PRN
Qty: 12 TABLET | Refills: 0 | Status: SHIPPED | OUTPATIENT
Start: 2023-05-30 | End: 2024-05-15

## 2023-05-30 RX ORDER — CYCLOBENZAPRINE HCL 10 MG
10 TABLET ORAL ONCE
Status: COMPLETED | OUTPATIENT
Start: 2023-05-30 | End: 2023-05-30

## 2023-05-30 RX ADMIN — IBUPROFEN 400 MG: 400 TABLET ORAL at 21:15

## 2023-05-30 RX ADMIN — CYCLOBENZAPRINE 10 MG: 10 TABLET, FILM COATED ORAL at 21:15

## 2023-05-30 ASSESSMENT — ACTIVITIES OF DAILY LIVING (ADL): ADLS_ACUITY_SCORE: 35

## 2023-05-30 ASSESSMENT — ENCOUNTER SYMPTOMS
NECK PAIN: 1
BACK PAIN: 1
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
## 2023-06-10 ENCOUNTER — APPOINTMENT (OUTPATIENT)
Dept: CT IMAGING | Facility: CLINIC | Age: 33
End: 2023-06-10
Attending: EMERGENCY MEDICINE
Payer: MEDICARE

## 2023-06-10 ENCOUNTER — HOSPITAL ENCOUNTER (EMERGENCY)
Facility: CLINIC | Age: 33
Discharge: HOME | End: 2023-06-10
Attending: EMERGENCY MEDICINE | Admitting: EMERGENCY MEDICINE
Payer: MEDICARE

## 2023-06-10 VITALS
RESPIRATION RATE: 16 BRPM | OXYGEN SATURATION: 97 % | HEART RATE: 80 BPM | TEMPERATURE: 97.8 F | BODY MASS INDEX: 23.41 KG/M2 | SYSTOLIC BLOOD PRESSURE: 112 MMHG | WEIGHT: 128 LBS | DIASTOLIC BLOOD PRESSURE: 81 MMHG

## 2023-06-10 DIAGNOSIS — R10.84 ABDOMINAL PAIN, GENERALIZED: ICD-10-CM

## 2023-06-10 DIAGNOSIS — K59.00 CONSTIPATION, UNSPECIFIED CONSTIPATION TYPE: ICD-10-CM

## 2023-06-10 LAB
ALBUMIN SERPL-MCNC: 4.5 G/DL (ref 3.5–5)
ALP SERPL-CCNC: 43 U/L (ref 45–120)
ALT SERPL W P-5'-P-CCNC: 14 U/L (ref 0–45)
ANION GAP SERPL CALCULATED.3IONS-SCNC: 11 MMOL/L (ref 5–18)
AST SERPL W P-5'-P-CCNC: 18 U/L (ref 0–40)
BASOPHILS # BLD AUTO: 0 10E3/UL (ref 0–0.2)
BASOPHILS NFR BLD AUTO: 0 %
BILIRUB DIRECT SERPL-MCNC: 0.1 MG/DL
BILIRUB SERPL-MCNC: 0.5 MG/DL (ref 0–1)
BUN SERPL-MCNC: 12 MG/DL (ref 8–22)
C REACTIVE PROTEIN LHE: <0.1 MG/DL (ref 0–?)
CALCIUM SERPL-MCNC: 8.9 MG/DL (ref 8.5–10.5)
CHLORIDE BLD-SCNC: 107 MMOL/L (ref 98–107)
CO2 SERPL-SCNC: 23 MMOL/L (ref 22–31)
CREAT SERPL-MCNC: 0.75 MG/DL (ref 0.6–1.1)
EOSINOPHIL # BLD AUTO: 0.2 10E3/UL (ref 0–0.7)
EOSINOPHIL NFR BLD AUTO: 2 %
ERYTHROCYTE [DISTWIDTH] IN BLOOD BY AUTOMATED COUNT: 12.3 % (ref 10–15)
GFR SERPL CREATININE-BSD FRML MDRD: >90 ML/MIN/1.73M2
GLUCOSE BLD-MCNC: 77 MG/DL (ref 70–125)
HCT VFR BLD AUTO: 41.3 % (ref 35–47)
HGB BLD-MCNC: 13.9 G/DL (ref 11.7–15.7)
IMM GRANULOCYTES # BLD: 0 10E3/UL
IMM GRANULOCYTES NFR BLD: 0 %
LIPASE SERPL-CCNC: 24 U/L (ref 0–52)
LYMPHOCYTES # BLD AUTO: 3.2 10E3/UL (ref 0.8–5.3)
LYMPHOCYTES NFR BLD AUTO: 39 %
MAGNESIUM SERPL-MCNC: 2.3 MG/DL (ref 1.8–2.6)
MCH RBC QN AUTO: 30.3 PG (ref 26.5–33)
MCHC RBC AUTO-ENTMCNC: 33.7 G/DL (ref 31.5–36.5)
MCV RBC AUTO: 90 FL (ref 78–100)
MONOCYTES # BLD AUTO: 0.4 10E3/UL (ref 0–1.3)
MONOCYTES NFR BLD AUTO: 5 %
NEUTROPHILS # BLD AUTO: 4.4 10E3/UL (ref 1.6–8.3)
NEUTROPHILS NFR BLD AUTO: 54 %
NRBC # BLD AUTO: 0 10E3/UL
NRBC BLD AUTO-RTO: 0 /100
PLATELET # BLD AUTO: 233 10E3/UL (ref 150–450)
POTASSIUM BLD-SCNC: 3.5 MMOL/L (ref 3.5–5)
PROT SERPL-MCNC: 7.2 G/DL (ref 6–8)
RBC # BLD AUTO: 4.58 10E6/UL (ref 3.8–5.2)
SODIUM SERPL-SCNC: 141 MMOL/L (ref 136–145)
WBC # BLD AUTO: 8.2 10E3/UL (ref 4–11)

## 2023-06-10 PROCEDURE — 36415 COLL VENOUS BLD VENIPUNCTURE: CPT | Performed by: EMERGENCY MEDICINE

## 2023-06-10 PROCEDURE — 96374 THER/PROPH/DIAG INJ IV PUSH: CPT | Mod: 59

## 2023-06-10 PROCEDURE — G1010 CDSM STANSON: HCPCS

## 2023-06-10 PROCEDURE — 74177 CT ABD & PELVIS W/CONTRAST: CPT | Mod: MG

## 2023-06-10 PROCEDURE — 82310 ASSAY OF CALCIUM: CPT | Performed by: EMERGENCY MEDICINE

## 2023-06-10 PROCEDURE — 85025 COMPLETE CBC W/AUTO DIFF WBC: CPT | Performed by: EMERGENCY MEDICINE

## 2023-06-10 PROCEDURE — 258N000003 HC RX IP 258 OP 636: Performed by: EMERGENCY MEDICINE

## 2023-06-10 PROCEDURE — 250N000011 HC RX IP 250 OP 636: Performed by: EMERGENCY MEDICINE

## 2023-06-10 PROCEDURE — 86140 C-REACTIVE PROTEIN: CPT | Performed by: EMERGENCY MEDICINE

## 2023-06-10 PROCEDURE — 82374 ASSAY BLOOD CARBON DIOXIDE: CPT | Performed by: EMERGENCY MEDICINE

## 2023-06-10 PROCEDURE — 99285 EMERGENCY DEPT VISIT HI MDM: CPT | Mod: 25

## 2023-06-10 PROCEDURE — 96361 HYDRATE IV INFUSION ADD-ON: CPT

## 2023-06-10 PROCEDURE — 250N000013 HC RX MED GY IP 250 OP 250 PS 637: Performed by: EMERGENCY MEDICINE

## 2023-06-10 PROCEDURE — 82248 BILIRUBIN DIRECT: CPT | Performed by: EMERGENCY MEDICINE

## 2023-06-10 PROCEDURE — 83690 ASSAY OF LIPASE: CPT | Performed by: EMERGENCY MEDICINE

## 2023-06-10 PROCEDURE — 83735 ASSAY OF MAGNESIUM: CPT | Performed by: EMERGENCY MEDICINE

## 2023-06-10 RX ORDER — ONDANSETRON 4 MG/1
4 TABLET, ORALLY DISINTEGRATING ORAL EVERY 8 HOURS PRN
Qty: 20 TABLET | Refills: 0 | Status: SHIPPED | OUTPATIENT
Start: 2023-06-10 | End: 2024-05-15

## 2023-06-10 RX ORDER — ONDANSETRON 4 MG/1
4 TABLET, ORALLY DISINTEGRATING ORAL EVERY 8 HOURS PRN
Qty: 20 TABLET | Refills: 0 | Status: SHIPPED | OUTPATIENT
Start: 2023-06-10 | End: 2023-06-10

## 2023-06-10 RX ORDER — SENNOSIDES 8.6 MG
2 TABLET ORAL ONCE
Status: COMPLETED | OUTPATIENT
Start: 2023-06-10 | End: 2023-06-10

## 2023-06-10 RX ORDER — IOPAMIDOL 755 MG/ML
100 INJECTION, SOLUTION INTRAVASCULAR ONCE
Status: COMPLETED | OUTPATIENT
Start: 2023-06-10 | End: 2023-06-10

## 2023-06-10 RX ORDER — DOCUSATE SODIUM 100 MG/1
300 CAPSULE, LIQUID FILLED ORAL ONCE
Status: COMPLETED | OUTPATIENT
Start: 2023-06-10 | End: 2023-06-10

## 2023-06-10 RX ORDER — POLYETHYLENE GLYCOL 3350 17 G/17G
34 POWDER, FOR SOLUTION ORAL ONCE
Status: COMPLETED | OUTPATIENT
Start: 2023-06-10 | End: 2023-06-10

## 2023-06-10 RX ORDER — ONDANSETRON 2 MG/ML
4 INJECTION INTRAMUSCULAR; INTRAVENOUS ONCE
Status: COMPLETED | OUTPATIENT
Start: 2023-06-10 | End: 2023-06-10

## 2023-06-10 RX ADMIN — POLYETHYLENE GLYCOL 3350 34 G: 17 POWDER, FOR SOLUTION ORAL at 19:04

## 2023-06-10 RX ADMIN — IOPAMIDOL 100 ML: 755 INJECTION, SOLUTION INTRAVENOUS at 17:50

## 2023-06-10 RX ADMIN — DOCUSATE SODIUM 300 MG: 100 CAPSULE, LIQUID FILLED ORAL at 19:04

## 2023-06-10 RX ADMIN — Medication 226 ML: at 19:16

## 2023-06-10 RX ADMIN — SENNOSIDES 2 TABLET: 8.6 TABLET, FILM COATED ORAL at 19:19

## 2023-06-10 RX ADMIN — ONDANSETRON 4 MG: 2 INJECTION INTRAMUSCULAR; INTRAVENOUS at 19:05

## 2023-06-10 RX ADMIN — SODIUM CHLORIDE, POTASSIUM CHLORIDE, SODIUM LACTATE AND CALCIUM CHLORIDE 1000 ML: 600; 310; 30; 20 INJECTION, SOLUTION INTRAVENOUS at 17:38

## 2023-06-10 ASSESSMENT — ACTIVITIES OF DAILY LIVING (ADL)
ADLS_ACUITY_SCORE: 33
ADLS_ACUITY_SCORE: 35

## 2023-06-10 NOTE — ED TRIAGE NOTES
Pt presents to the ED with c/o of abdominal pain and constipation that has been going on for a week. Pt reports lower abdominal pain. No BM for a week. Pt reports increased anxiety, agitation d/t the symptoms going on. Pt endorses nausea, no vomiting.      Triage Assessment     Row Name 06/10/23 1506       Triage Assessment (Adult)    Airway WDL WDL       Respiratory WDL    Respiratory WDL WDL       Skin Circulation/Temperature WDL    Skin Circulation/Temperature WDL WDL       Cardiac WDL    Cardiac WDL WDL       Peripheral/Neurovascular WDL    Peripheral Neurovascular WDL WDL

## 2023-06-10 NOTE — ED PROVIDER NOTES
EMERGENCY DEPARTMENT ENCOUNTER      NAME: Shannon Christopher  AGE: 33 year old female  YOB: 1990  MRN: [id number]  EVALUATION DATE & TIME: 6/10/2023  4:36 PM    PCP: Sveta Sandoval    ED PROVIDER: Panchito Chew M.D.      Chief Complaint   Patient presents with     Abdominal Pain     Constipation         IMPRESSION  1. Constipation, unspecified constipation type    2. Abdominal pain, generalized        PLAN  - routine home constipation cares (OTC Miralax/senna/Colace, fiber & water intake, exercise, feet elevation while on toilet); Golytely prescription as well  - Zofran for nausea  - close PCP follow up  - discharge to home    ED COURSE & MEDICAL DECISION MAKING    ED Course as of 06/10/23 2010   Sat [place] 10, 2023   1935 33yoF with history of hysterectomy, anxiety, borderline personality disorder presenting for evaluation of abdominal pain & constipation. Reports no bowel movement x 1 week with resulting abdominal bloating & diffuse crampy pain. No nausea or vomiting. Denies history of similar. Took no meds prior to arrival. Denies any opioid use.    Normal vitals on presentation. Exam with mild diffuse fullness with mild diffuse distention, no peritoneal signs, no CVA tenderness, clear lungs, normal work of breathing, normal neuro exam.    CT obtained with no SBO, perforated viscus, intraabdominal abscess, colitis, diverticulitis, acute emergent process. Screening blood work unremarkable as well with no leukocytosis with negative CRP, no SHANNAN, no glaring electrolyte abnormality, normal bilirubin/LFTs/lipase, no anemia.    Suspect simple constipation driving presentation. Doubt emergent life-threatening etiology. Given PO meds for constipation as well as enema. Given prescription for GoLytely for home as well. Patient able to tolerate PO and walk without difficulty. Patient comfortable with discharge at this time. Return precautions and need for PCP follow up discussed and understood. No further  questions at the time of discharge.       --------------------------------------------------------------------------------   --------------------------------------------------------------------------------     4:43 PM I met with the patient for the initial interview and physical examination. Discussed plan for treatment and workup in the ED.  7:23 PM I updated the patient.   7:58 PM Updated patient on results. Discussed and reviewed discharge plans, answered all questions. The patient agreed to the plan for discharge. Return precautions discussed.    This patient involved a high degree of complexity in medical decision making, as significant risks were present and assessed. Recent encounters & results in medical record reviewed by me.    All workup (i.e. any EKG/labs/imaging as per charting below) reviewed and independently interpreted by me. See respective sections for details.    Broad differential considered for this patient presenting, including but not limited to:  Constipation, SBO, perforated viscus, intraabdominal abscess, bowel spasm, gastritis, PUD, pancreatitis, hepatitis, acute biliary process, pyelo, sepsis, acute aortic syndrome      See additional MDM below if interested.      MEDICATIONS GIVEN IN THE EMERGENCY DEPARTMENT  Medications   lactated ringers BOLUS 1,000 mL (0 mLs Intravenous Stopped 6/10/23 1951)   ondansetron (ZOFRAN) injection 4 mg (4 mg Intravenous $Given 6/10/23 1905)   iopamidol (ISOVUE-370) solution 100 mL (100 mLs Intravenous $Given 6/10/23 1750)   Enema Compound (docusate/mineral oil/NaPhos) NO MAG CIT PREMIX (226 mLs Rectal $Given 6/10/23 1916)   polyethylene glycol (MIRALAX) Packet 34 g (34 g Oral $Given 6/10/23 1904)   docusate sodium (COLACE) capsule 300 mg (300 mg Oral $Given 6/10/23 1904)   sennosides (SENOKOT) tablet 2 tablet (2 tablets Oral $Given 6/10/23 1919)       NEW PRESCRIPTIONS STARTED AT TODAY'S ER VISIT  Discharge Medication List as of 6/10/2023  7:53 PM      START  taking these medications    Details   ondansetron (ZOFRAN ODT) 4 MG ODT tab Take 1 tablet (4 mg) by mouth every 8 hours as needed for nausea, Disp-20 tablet, R-0, E-Prescribe      polyethylene glycol (GOLYTELY) 236 g suspension Take 4,000 mLs by mouth once for 1 dose, Disp-4000 mL, R-0, E-PrescribeDrink 240 mL (8 oz) every 10 minutes until 4 L are consumed or the rectal effluent is clear; rapid drinking of each portion is preferred to drinking small amounts continuously         CONTINUE these medications which have NOT CHANGED    Details   acetaminophen (TYLENOL) 500 MG tablet [ACETAMINOPHEN (TYLENOL) 500 MG TABLET] Take 1,000 mg by mouth 3 (three) times a day., Historical      aspirin 81 MG EC tablet [ASPIRIN 81 MG EC TABLET] Take 1 tablet (81 mg total) by mouth 2 (two) times a day., R-0, OTC      cyclobenzaprine (FLEXERIL) 10 MG tablet Take 1 tablet (10 mg) by mouth 3 times daily as needed for muscle spasms, Disp-12 tablet, R-0, Local Print      divalproex (DEPAKOTE ER) 500 MG 24 hour tablet [DIVALPROEX (DEPAKOTE ER) 500 MG 24 HOUR TABLET] Take 500 mg by mouth at bedtime. , Historical      estradioL (ESTRACE) 2 MG tablet [ESTRADIOL (ESTRACE) 2 MG TABLET] Take 2 mg by mouth at bedtime. , Historical      HYDROmorphone (DILAUDID) 2 MG tablet [HYDROMORPHONE (DILAUDID) 2 MG TABLET] Take 2-4 mg by mouth every 4 (four) hours as needed for pain., Historical      hydrOXYzine HCL (ATARAX) 25 MG tablet [HYDROXYZINE HCL (ATARAX) 25 MG TABLET] Take 1 tablet (25 mg total) by mouth every 6 (six) hours as needed for itching (with pain, moderate pain)., Disp-30 tablet, R-0, Print      ketorolac (TORADOL) 10 mg tablet [KETOROLAC (TORADOL) 10 MG TABLET] Take 10 mg by mouth every 6 (six) hours as needed for pain., Historical      LATUDA 60 mg Tab tablet [LATUDA 60 MG TAB TABLET] Take 60 mg by mouth at bedtime. , CLARA, Historical      nystatin (MYCOSTATIN) cream [NYSTATIN (MYCOSTATIN) CREAM] Apply topically 3 (three) times a day as  needed for dry skin.Historical      OLANZapine (ZYPREXA) 7.5 MG tablet [OLANZAPINE (ZYPREXA) 7.5 MG TABLET] Take 7.5 mg by mouth at bedtime. , Historical      polyethylene glycol (MIRALAX) 17 gram packet [POLYETHYLENE GLYCOL (MIRALAX) 17 GRAM PACKET] Take 1 packet (17 g total) by mouth daily., Disp-7 packet, R-0, OTC      senna-docusate (PERICOLACE) 8.6-50 mg tablet [SENNA-DOCUSATE (PERICOLACE) 8.6-50 MG TABLET] Take 1-2 tablets by mouth 2 (two) times a day. Take while on oral narcotics to prevent or treat constipation., Disp-30 tablet, R-0, OTC      zonisamide (ZONEGRAN) 100 MG capsule [ZONISAMIDE (ZONEGRAN) 100 MG CAPSULE] Take 400 mg by mouth at bedtime. , Historical                 =================================================================      HPI  Patient information was obtained from: patient and friend    Use of : N/A         Shannon Christopher is a 33 year old female with a pertinent history of cerebral palsy, borderline personality disorder, bipolar 1 disorder, anxiety and depression who presents to this ED via walk in for evaluation of abdominal pain    For the past week the patient has endorsed ongoing constipation, nausea and lower abdominal pain. The patient reports that she has not had a bowel movement in approximately 1 week. She reports that she has had issues with constipation for awhile now. Additionally, she endorses increased anxiety due to her symptoms. She denies any recent medications for her symptoms other than pepto-bismol.     The patient denies any opioid use for pain. She denies any vomiting, or any other complaints.     --------------- MEDICAL HISTORY ---------------  PAST MEDICAL HISTORY:  Reviewed independently by me.  Past Medical History:   Diagnosis Date     ADD (attention deficit disorder)      Anxiety      Borderline mental retardation      Borderline personality disorder (H)      Cerebral palsy (H)      Depression      PONV (postoperative nausea and vomiting)       Uterine fibroid      Patient Active Problem List   Diagnosis     Pelvic pain     Plantar fascial fibromatosis     Moderate episode of recurrent major depressive disorder (H)     Ovulation pain     Lumbar strain     Depression     Cerebral palsy (H)     Borderline personality disorder (H)     Borderline intellectual disability     Anxiety     Trimalleolar fracture of ankle, closed, left, sequela     Anxiety and depression     Bipolar 1 disorder (H)       PAST SURGICAL HISTORY:  Reviewed independently by me.  Past Surgical History:   Procedure Laterality Date     ADENOIDECTOMY       APPLY EXTERNAL FIXATOR LOWER EXTREMITY Left 5/18/2021    Procedure: CLOSED REDUCTION EXTERNAL FIXATION, LEFT ANKLE FRACTURE;  Surgeon: Dereck Nielson DO;  Location: [facility] OR;  Service: Orthopedics     BUNIONECTOMY       EYE MUSCLE SURGERY       FOOT CAPSULE RELEASE W/ PERCUTANEOUS HEEL CORD LENGTHENING, TIBIAL TENDON TRANSFER Right      LAPAROSCOPIC HYSTERECTOMY N/A 1/4/2019    Procedure: ROBOTIC TOTAL LAPAROSCOPIC HYSTERECTOMY, BILATERAL SALPINGECTOMY LYSIS OF ADHESIONS;  Surgeon: Jose Golden MD;  Location: [facility] OR;  Service: Gynecology     OPEN REDUCTION INTERNAL FIXATION ANKLE Left 5/28/2021    Procedure: WITH OPEN REDUCTION INTERNAL FIXATION, TRIMALLEOLAR, LEFT ANKLE;  Surgeon: Dereck Costa DO;  Location: [facility] OR;  Service: Orthopedics     REMOVE HARDWARE LOWER EXTREMITY Left 5/28/2021    Procedure: LEFT ANKLE EXTERNAL FIXATION REMOVAL;  Surgeon: Dereck Costa DO;  Location: [facility] OR;  Service: Orthopedics     TYMPANOPLASTY         CURRENT MEDICATIONS:    Reviewed independently by me.  No current facility-administered medications for this encounter.    Current Outpatient Medications:      ondansetron (ZOFRAN ODT) 4 MG ODT tab, Take 1 tablet (4 mg) by mouth every 8 hours as needed for nausea, Disp: 20 tablet, Rfl: 0     polyethylene glycol (GOLYTELY) 236 g  suspension, Take 4,000 mLs by mouth once for 1 dose, Disp: 4000 mL, Rfl: 0     acetaminophen (TYLENOL) 500 MG tablet, [ACETAMINOPHEN (TYLENOL) 500 MG TABLET] Take 1,000 mg by mouth 3 (three) times a day., Disp: , Rfl:      aspirin 81 MG EC tablet, [ASPIRIN 81 MG EC TABLET] Take 1 tablet (81 mg total) by mouth 2 (two) times a day., Disp: , Rfl: 0     cyclobenzaprine (FLEXERIL) 10 MG tablet, Take 1 tablet (10 mg) by mouth 3 times daily as needed for muscle spasms, Disp: 12 tablet, Rfl: 0     divalproex (DEPAKOTE ER) 500 MG 24 hour tablet, [DIVALPROEX (DEPAKOTE ER) 500 MG 24 HOUR TABLET] Take 500 mg by mouth at bedtime. , Disp: , Rfl:      estradioL (ESTRACE) 2 MG tablet, [ESTRADIOL (ESTRACE) 2 MG TABLET] Take 2 mg by mouth at bedtime. , Disp: , Rfl:      HYDROmorphone (DILAUDID) 2 MG tablet, [HYDROMORPHONE (DILAUDID) 2 MG TABLET] Take 2-4 mg by mouth every 4 (four) hours as needed for pain., Disp: , Rfl:      hydrOXYzine HCL (ATARAX) 25 MG tablet, [HYDROXYZINE HCL (ATARAX) 25 MG TABLET] Take 1 tablet (25 mg total) by mouth every 6 (six) hours as needed for itching (with pain, moderate pain)., Disp: 30 tablet, Rfl: 0     ketorolac (TORADOL) 10 mg tablet, [KETOROLAC (TORADOL) 10 MG TABLET] Take 10 mg by mouth every 6 (six) hours as needed for pain., Disp: , Rfl:      LATUDA 60 mg Tab tablet, [LATUDA 60 MG TAB TABLET] Take 60 mg by mouth at bedtime. , Disp: , Rfl:      nystatin (MYCOSTATIN) cream, [NYSTATIN (MYCOSTATIN) CREAM] Apply topically 3 (three) times a day as needed for dry skin., Disp: , Rfl:      OLANZapine (ZYPREXA) 10 MG tablet, Take 1 tablet (10 mg) by mouth At Bedtime for 10 days, Disp: 10 tablet, Rfl: 0     OLANZapine (ZYPREXA) 7.5 MG tablet, [OLANZAPINE (ZYPREXA) 7.5 MG TABLET] Take 7.5 mg by mouth at bedtime. , Disp: , Rfl:      polyethylene glycol (MIRALAX) 17 gram packet, [POLYETHYLENE GLYCOL (MIRALAX) 17 GRAM PACKET] Take 1 packet (17 g total) by mouth daily., Disp: 7 packet, Rfl: 0     senna-docusate  (PERICOLACE) 8.6-50 mg tablet, [SENNA-DOCUSATE (PERICOLACE) 8.6-50 MG TABLET] Take 1-2 tablets by mouth 2 (two) times a day. Take while on oral narcotics to prevent or treat constipation., Disp: 30 tablet, Rfl: 0     zonisamide (ZONEGRAN) 100 MG capsule, [ZONISAMIDE (ZONEGRAN) 100 MG CAPSULE] Take 400 mg by mouth at bedtime. , Disp: , Rfl:     ALLERGIES:  Reviewed independently by me.  No Known Allergies    FAMILY HISTORY:  Reviewed independently by me.  Reviewed. No pertinent past family history.    SOCIAL HISTORY:   Reviewed independently by me.  Social History     Socioeconomic History     Marital status: Single   Tobacco Use     Smoking status: Some Days     Smokeless tobacco: Never     Tobacco comments:     < 1 PPW   Substance and Sexual Activity     Alcohol use: Yes     Comment: Alcoholic Drinks/day: rarely     Drug use: No   Social History Narrative    Patient arrived alone to the ED 08/13/17         --------------- PHYSICAL EXAM ---------------  Nursing notes and vitals independently reviewed by me.  VITALS:  Vitals:    06/10/23 1502 06/10/23 1743 06/10/23 1959   BP: 135/77 101/50 112/81   Pulse: 88 82 80   Resp: 18 20 16   Temp: 97.8  F (36.6  C)     TempSrc: Temporal     SpO2: 99% 96% 97%   Weight: 58.1 kg (128 lb)         PHYSICAL EXAM:    General:  alert, interactive, no distress  Eyes:  conjunctivae clear, conjugate gaze  HENT:  atraumatic, nose with no rhinorrhea, oropharynx clear  Neck:  no meningismus  Cardiovascular:  HR 80's during exam, regular rhythm, no murmurs, brisk cap refill  Chest:  no chest wall tenderness  Pulmonary:  no stridor, normal phonation, normal work of breathing, clear lungs bilaterally  Abdomen:  soft, mild diffuse tenderness with no rebound or guarding, mild diffuse fullness   :  no CVA tenderness  Back:  no midline spinal tenderness  Musculoskeletal:  no pretibial edema, no calf tenderness. Gross ROM intact to joints of extremities with no obvious deformities.  Skin:  warm,  dry, no rash  Neuro:  awake, alert, answers questions appropriately, follows commands, moves all limbs  Psych:  calm, anxious affect      --------------- RESULTS ---------------  LAB:  Reviewed and independently interpreted by me.  Results for orders placed or performed during the hospital encounter of 06/10/23   CT Abdomen Pelvis w Contrast    Impression    IMPRESSION:   1.  Normal appendix. No appendicitis.    2.  Hysterectomy.   Basic metabolic panel   Result Value Ref Range    Sodium 141 136 - 145 mmol/L    Potassium 3.5 3.5 - 5.0 mmol/L    Chloride 107 98 - 107 mmol/L    Carbon Dioxide (CO2) 23 22 - 31 mmol/L    Anion Gap 11 5 - 18 mmol/L    Urea Nitrogen 12 8 - 22 mg/dL    Creatinine 0.75 0.60 - 1.10 mg/dL    Calcium 8.9 8.5 - 10.5 mg/dL    Glucose 77 70 - 125 mg/dL    GFR Estimate >90 >60 mL/min/1.73m2   Result Value Ref Range    Lipase 24 0 - 52 U/L   Hepatic function panel   Result Value Ref Range    Bilirubin Total 0.5 0.0 - 1.0 mg/dL    Bilirubin Direct 0.1 <=0.5 mg/dL    Protein Total 7.2 6.0 - 8.0 g/dL    Albumin 4.5 3.5 - 5.0 g/dL    Alkaline Phosphatase 43 (L) 45 - 120 U/L    AST 18 0 - 40 U/L    ALT 14 0 - 45 U/L   CRP inflammation   Result Value Ref Range    CRP <0.1 0.0 - <0.8 mg/dL   Result Value Ref Range    Magnesium 2.3 1.8 - 2.6 mg/dL   CBC with platelets and differential   Result Value Ref Range    WBC Count 8.2 4.0 - 11.0 10e3/uL    RBC Count 4.58 3.80 - 5.20 10e6/uL    Hemoglobin 13.9 11.7 - 15.7 g/dL    Hematocrit 41.3 35.0 - 47.0 %    MCV 90 78 - 100 fL    MCH 30.3 26.5 - 33.0 pg    MCHC 33.7 31.5 - 36.5 g/dL    RDW 12.3 10.0 - 15.0 %    Platelet Count 233 150 - 450 10e3/uL    % Neutrophils 54 %    % Lymphocytes 39 %    % Monocytes 5 %    % Eosinophils 2 %    % Basophils 0 %    % Immature Granulocytes 0 %    NRBCs per 100 WBC 0 <1 /100    Absolute Neutrophils 4.4 1.6 - 8.3 10e3/uL    Absolute Lymphocytes 3.2 0.8 - 5.3 10e3/uL    Absolute Monocytes 0.4 0.0 - 1.3 10e3/uL    Absolute  Eosinophils 0.2 0.0 - 0.7 10e3/uL    Absolute Basophils 0.0 0.0 - 0.2 10e3/uL    Absolute Immature Granulocytes 0.0 <=0.4 10e3/uL    Absolute NRBCs 0.0 10e3/uL       RADIOLOGY:  Reviewed and independently interpreted by me. Please see official radiology report.  Recent Results (from the past 24 hour(s))   CT Abdomen Pelvis w Contrast    Narrative    EXAM: CT ABDOMEN PELVIS W CONTRAST  LOCATION: [facility]  DATE/TIME: 6/10/2023 5:56 PM CDT    INDICATION: pain, constipation  COMPARISON: 11/20/2021  TECHNIQUE: CT scan of the abdomen and pelvis was performed following injection of IV contrast. Multiplanar reformats were obtained. Dose reduction techniques were used.  CONTRAST: 100ml Isovue 370    FINDINGS:   LOWER CHEST: Normal.    HEPATOBILIARY: Normal.    PANCREAS: Normal.    SPLEEN: Normal.    ADRENAL GLANDS: Normal.    KIDNEYS/BLADDER: Normal.    BOWEL: Normal.    LYMPH NODES: Normal.    VASCULATURE: Unremarkable.    PELVIC ORGANS: Normal.    MUSCULOSKELETAL: Normal.      Impression    IMPRESSION:   1.  Normal appendix. No appendicitis.    2.  Hysterectomy.           --------------- ADDITIONAL MDM ---------------  History:  - Supplemental history from:       -- see above charting, if applicable: patient, family  - External Record(s) reviewed:       -- see above charting, if applicable       -- Inpatient/outpatient record (clinic visit 4/6/23), prior labs (blood 2/10/23), prior imaging (X-rays 5/30/23)    Workup:  - Chart documentation above includes differential considered and any EKGs or imaging independently interpreted by provider.  - In additional to work up documented, I considered the following work up:       -- see above charting, if applicable    External Consultation:  - Discussion of management with another provider:       -- see above charting, if applicable    Complicating Factors:  - Care impacted by chronic illness:       -- see above MDM, past medical history, & problem  list  - Care affected by social determinants of health:       -- see above social history       -- Access to Medical Care    Disposition Considerations:  - Discharge       -- I considered admission given that the patient came to the Emergency Department, but ultimately discharged the patient per decision making above       -- I recommended the patient continue their current prescription strength medication(s) as charted above in current medications list       -- I prescribed prescription strength medication(s) as charted above       -- I recommended over-the-counter medication(s) as charted above & in discharge instructions         I, Domi Hess, am serving as a scribe to document services personally performed by Dr. Panchito Chew based on my observation and the provider's statements to me. I, Panchito Chew MD attest that Domi Hess is acting in a scribe capacity, has observed my performance of the services and has documented them in accordance with my direction.      Panchito Chew MD  06/10/23  Emergency Medicine  [facility] EMERGENCY ROOM  [address] [address]  [phone]  Dept: [phone]       Panchito Chew MD  06/10/23 2010

## 2023-06-11 NOTE — DISCHARGE INSTRUCTIONS
For constipation, do the following:  - take over-the-counter Miralax 2 capfuls daily  - take over-the-counter docusate (Colace) 300mg daily  - take over-the-counter senna 17.2mg daily  - drink plenty of fluids  - eat at least 30g of fiber daily  - exercise at least 30 minutes daily  - try using a Squatty Potty (or something similar) to elevate your feet while sitting on the toilet (helps relax your pelvic floor muscles)    You can also use the prescribed GoLytely to clear our your colon.    Use the Zofran for any nausea.    Follow up with your Primary Care provider in 2 days for a recheck.    Return to the Emergency Department for any persistent vomiting, severe worsening, or any other concerns.

## 2023-06-12 ENCOUNTER — NURSE TRIAGE (OUTPATIENT)
Dept: NURSING | Facility: CLINIC | Age: 33
End: 2023-06-12
Payer: MEDICARE

## 2023-06-12 NOTE — TELEPHONE ENCOUNTER
"Patient is calling and is having constipation and was given a powder and still not working and tried home remedies and not working.  Patient had a slight bowel movement but not enough.  Constipation now is present for a few days.  Patient feels that the constipation is \"messing with my mental health\".  Patient states that she will go to [facility] in [place].      Reason for Disposition    Constant abdominal pain lasting > 2 hours    Additional Information    Negative: Vomiting bile (green color)    Negative: Patient sounds very sick or weak to the triager    Protocols used: CONSTIPATION-A-OH      "

## 2023-07-28 ENCOUNTER — LAB REQUISITION (OUTPATIENT)
Dept: LAB | Facility: CLINIC | Age: 33
End: 2023-07-28
Payer: MEDICARE

## 2023-07-28 ENCOUNTER — HOSPITAL ENCOUNTER (EMERGENCY)
Facility: CLINIC | Age: 33
Discharge: HOME | End: 2023-07-29
Attending: EMERGENCY MEDICINE | Admitting: EMERGENCY MEDICINE
Payer: MEDICARE

## 2023-07-28 ENCOUNTER — APPOINTMENT (OUTPATIENT)
Dept: CT IMAGING | Facility: CLINIC | Age: 33
End: 2023-07-28
Attending: EMERGENCY MEDICINE
Payer: MEDICARE

## 2023-07-28 DIAGNOSIS — K52.9 NON-SPECIFIC COLITIS: ICD-10-CM

## 2023-07-28 DIAGNOSIS — R45.851 PASSIVE SUICIDAL IDEATIONS: ICD-10-CM

## 2023-07-28 DIAGNOSIS — R14.0 ABDOMINAL BLOATING: ICD-10-CM

## 2023-07-28 DIAGNOSIS — N95.1 MENOPAUSAL AND FEMALE CLIMACTERIC STATES: ICD-10-CM

## 2023-07-28 LAB
ALBUMIN SERPL-MCNC: 4.3 G/DL (ref 3.5–5)
ALBUMIN UR-MCNC: NEGATIVE MG/DL
ALP SERPL-CCNC: 49 U/L (ref 45–120)
ALT SERPL W P-5'-P-CCNC: 18 U/L (ref 0–45)
AMORPH CRY #/AREA URNS HPF: ABNORMAL /HPF
ANION GAP SERPL CALCULATED.3IONS-SCNC: 10 MMOL/L (ref 5–18)
APPEARANCE UR: ABNORMAL
AST SERPL W P-5'-P-CCNC: 17 U/L (ref 0–40)
BASOPHILS # BLD AUTO: 0 10E3/UL (ref 0–0.2)
BASOPHILS NFR BLD AUTO: 0 %
BILIRUB SERPL-MCNC: 0.2 MG/DL (ref 0–1)
BILIRUB UR QL STRIP: NEGATIVE
BUN SERPL-MCNC: 7 MG/DL (ref 8–22)
CALCIUM SERPL-MCNC: 9.3 MG/DL (ref 8.5–10.5)
CHLORIDE BLD-SCNC: 107 MMOL/L (ref 98–107)
CO2 SERPL-SCNC: 24 MMOL/L (ref 22–31)
COLOR UR AUTO: YELLOW
CREAT SERPL-MCNC: 0.7 MG/DL (ref 0.6–1.1)
EOSINOPHIL # BLD AUTO: 0.2 10E3/UL (ref 0–0.7)
EOSINOPHIL NFR BLD AUTO: 3 %
ERYTHROCYTE [DISTWIDTH] IN BLOOD BY AUTOMATED COUNT: 12.4 % (ref 10–15)
GFR SERPL CREATININE-BSD FRML MDRD: >90 ML/MIN/1.73M2
GLUCOSE BLD-MCNC: 100 MG/DL (ref 70–125)
GLUCOSE UR STRIP-MCNC: NEGATIVE MG/DL
HCG SERPL QL: NEGATIVE
HCT VFR BLD AUTO: 40 % (ref 35–47)
HGB BLD-MCNC: 13.5 G/DL (ref 11.7–15.7)
HGB UR QL STRIP: NEGATIVE
IMM GRANULOCYTES # BLD: 0 10E3/UL
IMM GRANULOCYTES NFR BLD: 0 %
KETONES UR STRIP-MCNC: NEGATIVE MG/DL
LEUKOCYTE ESTERASE UR QL STRIP: NEGATIVE
LIPASE SERPL-CCNC: 36 U/L (ref 0–52)
LYMPHOCYTES # BLD AUTO: 3.7 10E3/UL (ref 0.8–5.3)
LYMPHOCYTES NFR BLD AUTO: 44 %
MCH RBC QN AUTO: 30.7 PG (ref 26.5–33)
MCHC RBC AUTO-ENTMCNC: 33.8 G/DL (ref 31.5–36.5)
MCV RBC AUTO: 91 FL (ref 78–100)
MONOCYTES # BLD AUTO: 0.7 10E3/UL (ref 0–1.3)
MONOCYTES NFR BLD AUTO: 8 %
NEUTROPHILS # BLD AUTO: 3.8 10E3/UL (ref 1.6–8.3)
NEUTROPHILS NFR BLD AUTO: 45 %
NITRATE UR QL: NEGATIVE
NRBC # BLD AUTO: 0 10E3/UL
NRBC BLD AUTO-RTO: 0 /100
PH UR STRIP: 8 [PH] (ref 5–7)
PLATELET # BLD AUTO: 251 10E3/UL (ref 150–450)
POTASSIUM BLD-SCNC: 3.4 MMOL/L (ref 3.5–5)
PROT SERPL-MCNC: 6.9 G/DL (ref 6–8)
RBC # BLD AUTO: 4.4 10E6/UL (ref 3.8–5.2)
RBC URINE: 0 /HPF
SODIUM SERPL-SCNC: 141 MMOL/L (ref 136–145)
SP GR UR STRIP: 1.01 (ref 1–1.03)
SQUAMOUS EPITHELIAL: <1 /HPF
UROBILINOGEN UR STRIP-MCNC: <2 MG/DL
WBC # BLD AUTO: 8.4 10E3/UL (ref 4–11)
WBC URINE: 0 /HPF

## 2023-07-28 PROCEDURE — 83002 ASSAY OF GONADOTROPIN (LH): CPT | Mod: ORL | Performed by: NURSE PRACTITIONER

## 2023-07-28 PROCEDURE — 84443 ASSAY THYROID STIM HORMONE: CPT | Performed by: NURSE PRACTITIONER

## 2023-07-28 PROCEDURE — 82670 ASSAY OF TOTAL ESTRADIOL: CPT | Mod: ORL | Performed by: NURSE PRACTITIONER

## 2023-07-28 PROCEDURE — 83690 ASSAY OF LIPASE: CPT | Performed by: EMERGENCY MEDICINE

## 2023-07-28 PROCEDURE — 81001 URINALYSIS AUTO W/SCOPE: CPT | Performed by: EMERGENCY MEDICINE

## 2023-07-28 PROCEDURE — 99285 EMERGENCY DEPT VISIT HI MDM: CPT | Mod: 25

## 2023-07-28 PROCEDURE — 36415 COLL VENOUS BLD VENIPUNCTURE: CPT | Performed by: EMERGENCY MEDICINE

## 2023-07-28 PROCEDURE — 250N000011 HC RX IP 250 OP 636: Mod: JZ | Performed by: EMERGENCY MEDICINE

## 2023-07-28 PROCEDURE — 96374 THER/PROPH/DIAG INJ IV PUSH: CPT | Mod: 59

## 2023-07-28 PROCEDURE — 87491 CHLMYD TRACH DNA AMP PROBE: CPT | Performed by: EMERGENCY MEDICINE

## 2023-07-28 PROCEDURE — 83001 ASSAY OF GONADOTROPIN (FSH): CPT | Performed by: NURSE PRACTITIONER

## 2023-07-28 PROCEDURE — 80053 COMPREHEN METABOLIC PANEL: CPT | Performed by: EMERGENCY MEDICINE

## 2023-07-28 PROCEDURE — 258N000003 HC RX IP 258 OP 636: Performed by: EMERGENCY MEDICINE

## 2023-07-28 PROCEDURE — 74177 CT ABD & PELVIS W/CONTRAST: CPT | Mod: MA

## 2023-07-28 PROCEDURE — 87591 N.GONORRHOEAE DNA AMP PROB: CPT | Performed by: EMERGENCY MEDICINE

## 2023-07-28 PROCEDURE — 84703 CHORIONIC GONADOTROPIN ASSAY: CPT | Performed by: EMERGENCY MEDICINE

## 2023-07-28 PROCEDURE — 96361 HYDRATE IV INFUSION ADD-ON: CPT

## 2023-07-28 PROCEDURE — 85025 COMPLETE CBC W/AUTO DIFF WBC: CPT | Performed by: EMERGENCY MEDICINE

## 2023-07-28 RX ORDER — IOPAMIDOL 755 MG/ML
100 INJECTION, SOLUTION INTRAVASCULAR ONCE
Status: COMPLETED | OUTPATIENT
Start: 2023-07-28 | End: 2023-07-28

## 2023-07-28 RX ORDER — KETOROLAC TROMETHAMINE 15 MG/ML
15 INJECTION, SOLUTION INTRAMUSCULAR; INTRAVENOUS ONCE
Status: COMPLETED | OUTPATIENT
Start: 2023-07-28 | End: 2023-07-28

## 2023-07-28 RX ADMIN — SODIUM CHLORIDE 500 ML: 9 INJECTION, SOLUTION INTRAVENOUS at 22:23

## 2023-07-28 RX ADMIN — KETOROLAC TROMETHAMINE 15 MG: 15 INJECTION, SOLUTION INTRAMUSCULAR; INTRAVENOUS at 22:42

## 2023-07-28 RX ADMIN — IOPAMIDOL 100 ML: 755 INJECTION, SOLUTION INTRAVENOUS at 23:29

## 2023-07-28 ASSESSMENT — ACTIVITIES OF DAILY LIVING (ADL): ADLS_ACUITY_SCORE: 35

## 2023-07-29 VITALS
SYSTOLIC BLOOD PRESSURE: 111 MMHG | HEART RATE: 74 BPM | OXYGEN SATURATION: 98 % | TEMPERATURE: 98.3 F | RESPIRATION RATE: 18 BRPM | DIASTOLIC BLOOD PRESSURE: 60 MMHG

## 2023-07-29 LAB
C TRACH DNA SPEC QL PROBE+SIG AMP: NEGATIVE
ESTRADIOL SERPL-MCNC: 52 PG/ML
FSH SERPL IRP2-ACNC: 7.9 MIU/ML
LH SERPL-ACNC: 10.3 MIU/ML
N GONORRHOEA DNA SPEC QL NAA+PROBE: NEGATIVE
TSH SERPL DL<=0.005 MIU/L-ACNC: 2.16 UIU/ML (ref 0.3–4.2)

## 2023-07-29 ASSESSMENT — ENCOUNTER SYMPTOMS
FREQUENCY: 1
DIARRHEA: 0
VOMITING: 0
SHORTNESS OF BREATH: 1
ABDOMINAL PAIN: 1
NAUSEA: 0
BACK PAIN: 1

## 2023-07-29 ASSESSMENT — ACTIVITIES OF DAILY LIVING (ADL): ADLS_ACUITY_SCORE: 35

## 2023-07-29 NOTE — ED TRIAGE NOTES
Pt reports that she is having multiple complaints. SOB, back pain, abdominal pain, and urinary frequency. Pt has had a partial hysterectomy ; reports concerned being pregnant. Reports her abdomen has been getting larger.     Pt reports having suicidal thoughts of wishing to be dead, but has no intent of plan or has self harmed.    Triage Assessment       Row Name 07/28/23 2052       Triage Assessment (Adult)    Airway WDL WDL       Respiratory WDL    Respiratory WDL X  sob       Skin Circulation/Temperature WDL    Skin Circulation/Temperature WDL WDL       Cardiac WDL    Cardiac WDL WDL       Peripheral/Neurovascular WDL    Peripheral Neurovascular WDL WDL       Cognitive/Neuro/Behavioral WDL    Cognitive/Neuro/Behavioral WDL WDL

## 2023-07-29 NOTE — DISCHARGE INSTRUCTIONS
Discussed the ER if any concerns for plan or active thoughts about harming self recommend calling your crisis team or returning to the ER.  As well for the colitis recommend high-fiber diet and monitoring of stools for any blood or worsening pain, fevers, nausea or vomiting.  Recommend follow-up with your primary doctor to recheck abdominal symptoms as well as follow-up on urine culture and gonorrhea and Chlamydia screening test results.

## 2023-07-29 NOTE — ED PROVIDER NOTES
NAME: Shannon Christopher  AGE: 33 year old female  YOB: 1990  MRN: [id number]  EVALUATION DATE & TIME: 2023  9:33 PM    PCP: Sveta Sandoval    ED PROVIDER: Blaine Burleson M.D.      Chief Complaint   Patient presents with    Abdominal Pain    Shortness of Breath    Back Pain    Urinary Frequency     FINAL IMPRESSION:  1. Non-specific colitis    2. Abdominal bloating    3. Passive suicidal ideations        MEDICAL DECISION MAKIN:34 PM I met with the patient, obtained history, performed an initial exam, and discussed options and plan for diagnostics and treatment here in the ED.   12:42 AM I updated the patient and discussed plans for discharge.   12:49 AM The patient is relieved and is good to be discharged.     Patient was clinically assessed and consented to treatment. After assessment, medical decision making and workup were discussed with the patient. The patient was agreeable to plan for testing, workup, and treatment.  Pertinent Labs & Imaging studies reviewed. (See chart for details)       Medical Decision Making    History:  Supplemental history from: Documented in chart, if applicable  External Record(s) reviewed: Documented in chart, if applicable.    Work Up:  Chart documentation includes differential considered and any EKGs or imaging independently interpreted by provider, where specified.  In additional to work up documented, I considered the following work up: Documented in chart, if applicable.    External consultation:  Discussion of management with another provider: Documented in chart, if applicable    Complicating factors:  Care impacted by chronic illness: N/A  Care affected by social determinants of health: N/A    Disposition considerations: Discharge. No recommendations on prescription strength medication(s). See documentation for any additional details.    Shannon Christopher is a 33 year old female who presents with abdominal pain, shortness of breath, back pain,  urinary frequency.   Differential diagnosis includes but not limited to constipation, small bowel obstruction, pregnancy, gonorrhea, chlamydia, PID, urinary tract infection, kidney stone.  Patient with a history of pelvic pain in the past but prior work-ups have been negative it was thought possibly related to constipation another time.  Patient denies any constipation presently and bowel movements have been normal.  Patient concerned as she feels her abdomen is full and she is not sure if she is pregnant as she did have unprotected sex 2 weeks ago.  Patient does not know if this partner had any STIs and will check gonorrhea and chlamydia.  Urinalysis also be checked as well as labs and plan for imaging.  Patient not pregnant and reassured though I feel it would be unlikely that she could get pregnant as she is partial hysterectomy with bilateral tubal ligation.  Following this reassurance patient resting comfortably after Toradol and labs did not show leukocytosis, stable hemoglobin, unremarkable metabolic and biliary/hepatic work-up.  Additionally patient reports shortness of breath for examination discussion patient is mostly anxious which does not relate to a history of anxiety.  Feels likely causing for her interpretation of the shortness of breath as she has no chest pain, no increased work of breathing, no abnormal findings on auscultation.  Patient's flanks are not tender and unlikely kidney issue and she reports more posterior pelvic or sacral pain that radiates here from the front.  Lipase was normal and after urinalysis returned negative for any hematuria patient will be sent for CT scan with contrast.  CT scan was unremarkable for any acute pelvic findings though her lower colon did show some slight inflammation with liquidy stools.  Feel this is likely possibly causing some of her distention though there is no signs of obstruction she could have some mild colitis.  At this time given she has not  traveled outside the [place] this is indeterminate or inflammatory colitis would plan for expectant management and likely self-limiting.  Patient also had reported some passive suicidal thoughts to triage.  On my questioning patient does states she has history of depression anxiety and has had some increase stressors which have caused some suicidal thoughts.  She does have a care plan at home with crisis team that she contact if these get worse however at this time she reports no active thoughts of suicide or harming herself.  She reports no intention to harm himself and would feel safe going home.  She does not wish to speak with [facility]  as she does have a therapist and outpatient team to call she reports.  She reports that she is concerned about the suicidal thoughts she would come in for that however she did answer questions truthfully when asked in triage she does not feel however now she needs further medical work-up or treatment as she is taking her medications appropriately and feels comfortable and safe being at home.  Patient I discussed findings on CT and patient was reassured.  She will be plan for discharge home with follow-up to primary care.    0 minutes of critical care time    MEDICATIONS GIVEN IN THE EMERGENCY:  Medications   ketorolac (TORADOL) injection 15 mg (15 mg Intravenous $Given 7/28/23 2242)   0.9% sodium chloride BOLUS (0 mLs Intravenous Stopped 7/28/23 2300)   iopamidol (ISOVUE-370) solution 100 mL (100 mLs Intravenous $Given 7/28/23 2329)       NEW PRESCRIPTIONS STARTED AT TODAY'S ER VISIT:  Discharge Medication List as of 7/29/2023  1:04 AM          =================================================================    HPI    Patient information was obtained from: Patient    Use of : N/A  - Language English    Shannon Christopher is a 33 year old female with a past medical history of anxiety, borderline personality disorder, cerebral palsy, depression, uterine fibroid, partial  hysterectomy, who presents lower abdominal pain and bloating.  Patient reports increasing lower abdominal pain for the last day or 2 with some distention.  Patient reports bowel movements have been normal and she does not feel she is constipated.  Patient does report having an unprotected sexual encounter 2 weeks ago with a partner who is not well known.  She does not know if he has had any sexually transmitted diseases but would like to be checked for this.  She denies no discharge but does report some increased urinary frequency.  She reports no dysuria, no blood in her urine, no vaginal discharge.  She does report also concern for pregnancy as she had a history of a partial hysterectomy and bilateral tubal ligation she is uncertain if she has enough uterus left that could possibly cause pregnancy.  She would like testing for this.  Patient also states that with all of these conditions currently, she does feel short of breath but does report this to probably is from her anxiety as she does have history of this.  She did take her evening medications and some of the symptoms have been improved as far as anxiety though she does report having some recent stressors causing exacerbation of depression.  She reports to triage that she had suicidal thoughts which she states she has had in the past though not chronically.  She reports when these happen she has not had any plan or intention to act on them and that she does usually talk to her therapist or crisis team if they get worse.  She has no intention of harming himself and feels safe at home and in the ER here.  She denies any intention to harm self but does have the passive thoughts presently she admits to.      REVIEW OF SYSTEMS   Review of Systems   Respiratory:  Positive for shortness of breath.    Gastrointestinal:  Positive for abdominal pain. Negative for diarrhea, nausea and vomiting.   Genitourinary:  Positive for frequency.   Musculoskeletal:  Positive for  back pain.   Psychiatric/Behavioral:  Positive for suicidal ideas.    All other systems reviewed and are negative.       PAST MEDICAL HISTORY:  Past Medical History:   Diagnosis Date    ADD (attention deficit disorder)     Anxiety     Borderline mental retardation     Borderline personality disorder (H)     Cerebral palsy (H)     Depression     PONV (postoperative nausea and vomiting)     Uterine fibroid        PAST SURGICAL HISTORY:  Past Surgical History:   Procedure Laterality Date    ADENOIDECTOMY      APPLY EXTERNAL FIXATOR LOWER EXTREMITY Left 5/18/2021    Procedure: CLOSED REDUCTION EXTERNAL FIXATION, LEFT ANKLE FRACTURE;  Surgeon: Dereck Nielson DO;  Location: [facility];  Service: Orthopedics    BUNIONECTOMY      EYE MUSCLE SURGERY      FOOT CAPSULE RELEASE W/ PERCUTANEOUS HEEL CORD LENGTHENING, TIBIAL TENDON TRANSFER Right     LAPAROSCOPIC HYSTERECTOMY N/A 1/4/2019    Procedure: ROBOTIC TOTAL LAPAROSCOPIC HYSTERECTOMY, BILATERAL SALPINGECTOMY LYSIS OF ADHESIONS;  Surgeon: Jose Golden MD;  Location: [facility] OR;  Service: Gynecology    OPEN REDUCTION INTERNAL FIXATION ANKLE Left 5/28/2021    Procedure: WITH OPEN REDUCTION INTERNAL FIXATION, TRIMALLEOLAR, LEFT ANKLE;  Surgeon: Dereck Costa DO;  Location: [facility] OR;  Service: Orthopedics    REMOVE HARDWARE LOWER EXTREMITY Left 5/28/2021    Procedure: LEFT ANKLE EXTERNAL FIXATION REMOVAL;  Surgeon: Dereck Costa DO;  Location: [facility] OR;  Service: Orthopedics    TYMPANOPLASTY         CURRENT MEDICATIONS:    No current facility-administered medications for this encounter.    Current Outpatient Medications:     acetaminophen (TYLENOL) 500 MG tablet, [ACETAMINOPHEN (TYLENOL) 500 MG TABLET] Take 1,000 mg by mouth 3 (three) times a day., Disp: , Rfl:     aspirin 81 MG EC tablet, [ASPIRIN 81 MG EC TABLET] Take 1 tablet (81 mg total) by mouth 2 (two) times a day., Disp: , Rfl: 0    cyclobenzaprine  (FLEXERIL) 10 MG tablet, Take 1 tablet (10 mg) by mouth 3 times daily as needed for muscle spasms, Disp: 12 tablet, Rfl: 0    divalproex (DEPAKOTE ER) 500 MG 24 hour tablet, [DIVALPROEX (DEPAKOTE ER) 500 MG 24 HOUR TABLET] Take 500 mg by mouth at bedtime. , Disp: , Rfl:     estradioL (ESTRACE) 2 MG tablet, [ESTRADIOL (ESTRACE) 2 MG TABLET] Take 2 mg by mouth at bedtime. , Disp: , Rfl:     HYDROmorphone (DILAUDID) 2 MG tablet, [HYDROMORPHONE (DILAUDID) 2 MG TABLET] Take 2-4 mg by mouth every 4 (four) hours as needed for pain., Disp: , Rfl:     hydrOXYzine HCL (ATARAX) 25 MG tablet, [HYDROXYZINE HCL (ATARAX) 25 MG TABLET] Take 1 tablet (25 mg total) by mouth every 6 (six) hours as needed for itching (with pain, moderate pain)., Disp: 30 tablet, Rfl: 0    ketorolac (TORADOL) 10 mg tablet, [KETOROLAC (TORADOL) 10 MG TABLET] Take 10 mg by mouth every 6 (six) hours as needed for pain., Disp: , Rfl:     LATUDA 60 mg Tab tablet, [LATUDA 60 MG TAB TABLET] Take 60 mg by mouth at bedtime. , Disp: , Rfl:     nystatin (MYCOSTATIN) cream, [NYSTATIN (MYCOSTATIN) CREAM] Apply topically 3 (three) times a day as needed for dry skin., Disp: , Rfl:     OLANZapine (ZYPREXA) 10 MG tablet, Take 1 tablet (10 mg) by mouth At Bedtime for 10 days, Disp: 10 tablet, Rfl: 0    OLANZapine (ZYPREXA) 7.5 MG tablet, [OLANZAPINE (ZYPREXA) 7.5 MG TABLET] Take 7.5 mg by mouth at bedtime. , Disp: , Rfl:     ondansetron (ZOFRAN ODT) 4 MG ODT tab, Take 1 tablet (4 mg) by mouth every 8 hours as needed for nausea, Disp: 20 tablet, Rfl: 0    polyethylene glycol (MIRALAX) 17 gram packet, [POLYETHYLENE GLYCOL (MIRALAX) 17 GRAM PACKET] Take 1 packet (17 g total) by mouth daily., Disp: 7 packet, Rfl: 0    senna-docusate (PERICOLACE) 8.6-50 mg tablet, [SENNA-DOCUSATE (PERICOLACE) 8.6-50 MG TABLET] Take 1-2 tablets by mouth 2 (two) times a day. Take while on oral narcotics to prevent or treat constipation., Disp: 30 tablet, Rfl: 0    zonisamide (ZONEGRAN) 100 MG  capsule, [ZONISAMIDE (ZONEGRAN) 100 MG CAPSULE] Take 400 mg by mouth at bedtime. , Disp: , Rfl:     ALLERGIES:  No Known Allergies    FAMILY HISTORY:  No family history on file.    SOCIAL HISTORY:   Social History     Socioeconomic History    Marital status: Single   Tobacco Use    Smoking status: Some Days    Smokeless tobacco: Never    Tobacco comments:     < 1 PPW   Substance and Sexual Activity    Alcohol use: Yes     Comment: Alcoholic Drinks/day: rarely    Drug use: No   Social History Narrative    Patient arrived alone to the ED 08/13/17         PHYSICAL EXAM:    Vitals: /60   Pulse 74   Temp 98.3  F (36.8  C) (Temporal)   Resp 18   SpO2 98%    Physical Exam  Vitals and nursing note reviewed.   Constitutional:       General: She is not in acute distress.     Appearance: She is well-developed and normal weight. She is not ill-appearing or toxic-appearing.   HENT:      Head: Normocephalic.   Eyes:      General: No scleral icterus.  Cardiovascular:      Rate and Rhythm: Normal rate and regular rhythm.      Heart sounds: Normal heart sounds.   Pulmonary:      Effort: Pulmonary effort is normal. No respiratory distress.      Breath sounds: Normal breath sounds. No stridor. No wheezing, rhonchi or rales.   Chest:      Chest wall: No tenderness.   Abdominal:      General: Abdomen is flat. Bowel sounds are normal.      Palpations: Abdomen is soft.      Tenderness: There is no abdominal tenderness. There is no right CVA tenderness, left CVA tenderness, guarding or rebound.      Hernia: No hernia is present.   Skin:     General: Skin is warm and dry.      Coloration: Skin is not jaundiced.   Neurological:      General: No focal deficit present.      Mental Status: She is alert.   Psychiatric:         Attention and Perception: She is attentive.         Mood and Affect: Mood is anxious. Affect is not tearful or inappropriate.         Speech: Speech normal.         Behavior: Behavior is not agitated, aggressive  or hyperactive. Behavior is cooperative.         Thought Content: Thought content is not paranoid. Thought content includes suicidal ideation. Thought content does not include homicidal ideation. Thought content does not include suicidal plan.         Judgment: Judgment is not inappropriate.           LAB:  All pertinent labs reviewed and interpreted.  Labs Ordered and Resulted from Time of ED Arrival to Time of ED Departure   COMPREHENSIVE METABOLIC PANEL - Abnormal       Result Value    Sodium 141      Potassium 3.4 (*)     Chloride 107      Carbon Dioxide (CO2) 24      Anion Gap 10      Urea Nitrogen 7 (*)     Creatinine 0.70      Calcium 9.3      Glucose 100      Alkaline Phosphatase 49      AST 17      ALT 18      Protein Total 6.9      Albumin 4.3      Bilirubin Total 0.2      GFR Estimate >90     ROUTINE UA WITH MICROSCOPIC REFLEX TO CULTURE - Abnormal    Color Urine Yellow      Appearance Urine Cloudy (*)     Glucose Urine Negative      Bilirubin Urine Negative      Ketones Urine Negative      Specific Gravity Urine 1.015      Blood Urine Negative      pH Urine 8.0 (*)     Protein Albumin Urine Negative      Urobilinogen Urine <2.0      Nitrite Urine Negative      Leukocyte Esterase Urine Negative      Amorphous Crystals Urine Moderate (*)     RBC Urine 0      WBC Urine 0      Squamous Epithelials Urine <1     LIPASE - Normal    Lipase 36     HCG QUALITATIVE PREGNANCY - Normal    hCG Serum Qualitative Negative     CBC WITH PLATELETS AND DIFFERENTIAL    WBC Count 8.4      RBC Count 4.40      Hemoglobin 13.5      Hematocrit 40.0      MCV 91      MCH 30.7      MCHC 33.8      RDW 12.4      Platelet Count 251      % Neutrophils 45      % Lymphocytes 44      % Monocytes 8      % Eosinophils 3      % Basophils 0      % Immature Granulocytes 0      NRBCs per 100 WBC 0      Absolute Neutrophils 3.8      Absolute Lymphocytes 3.7      Absolute Monocytes 0.7      Absolute Eosinophils 0.2      Absolute Basophils 0.0       Absolute Immature Granulocytes 0.0      Absolute NRBCs 0.0     CHLAMYDIA TRACHOMATIS/NEISSERIA GONORRHOEAE BY PCR       RADIOLOGY:  CT Abdomen Pelvis w Contrast   Final Result   IMPRESSION:    1.  Fluid content in the lower sigmoid colon and rectum with mild wall thickening of the lower rectum which may reflect an infectious or inflammatory process. No mechanical bowel obstruction or free air.      2.  2 mm nonobstructing left renal stone.            PROCEDURES:   Procedures       Blaine Burleson M.D.  Emergency Medicine  [facility] Emergency Department       Blaine Burleson MD  07/29/23 4304

## 2023-07-30 ENCOUNTER — NURSE TRIAGE (OUTPATIENT)
Dept: NURSING | Facility: CLINIC | Age: 33
End: 2023-07-30
Payer: MEDICARE

## 2023-07-30 NOTE — TELEPHONE ENCOUNTER
Shannon calling requesting help understanding AVS after ED visit 2 days ago.  Questions answered states she is feeling better.    Janet Hill RN on 7/30/2023 at 6:14 PM    Reason for Disposition   Health Information question, no triage required and triager able to answer question    Protocols used: Information Only Call - No Triage-A-

## 2023-08-23 ENCOUNTER — HOSPITAL ENCOUNTER (EMERGENCY)
Facility: CLINIC | Age: 33
Discharge: LEFT BEFORE DISCHARGE | End: 2023-08-23
Admitting: EMERGENCY MEDICINE
Payer: MEDICARE

## 2023-08-23 VITALS
TEMPERATURE: 97.9 F | BODY MASS INDEX: 21.85 KG/M2 | SYSTOLIC BLOOD PRESSURE: 118 MMHG | HEART RATE: 89 BPM | HEIGHT: 64 IN | DIASTOLIC BLOOD PRESSURE: 72 MMHG | RESPIRATION RATE: 18 BRPM | OXYGEN SATURATION: 99 % | WEIGHT: 128 LBS

## 2023-08-23 LAB
ALBUMIN UR-MCNC: NEGATIVE MG/DL
AMORPH CRY #/AREA URNS HPF: ABNORMAL /HPF
AMPHETAMINES UR QL SCN: ABNORMAL
APPEARANCE UR: ABNORMAL
BACTERIA #/AREA URNS HPF: ABNORMAL /HPF
BARBITURATES UR QL: ABNORMAL
BENZODIAZ UR QL: ABNORMAL
BILIRUB UR QL STRIP: NEGATIVE
CANNABINOIDS UR QL SCN: ABNORMAL
COCAINE UR QL: ABNORMAL
COLOR UR AUTO: YELLOW
CREAT UR-MCNC: 37 MG/DL
GLUCOSE BLDC GLUCOMTR-MCNC: 91 MG/DL (ref 70–99)
GLUCOSE UR STRIP-MCNC: NEGATIVE MG/DL
HCG UR QL: NEGATIVE
HGB UR QL STRIP: NEGATIVE
KETONES UR STRIP-MCNC: ABNORMAL MG/DL
LEUKOCYTE ESTERASE UR QL STRIP: NEGATIVE
NITRATE UR QL: NEGATIVE
OPIATES UR QL SCN: ABNORMAL
OXYCODONE UR QL: ABNORMAL
PCP UR QL SCN: ABNORMAL
PH UR STRIP: 7 [PH] (ref 5–7)
RBC URINE: 1 /HPF
SP GR UR STRIP: 1.01 (ref 1–1.03)
SQUAMOUS EPITHELIAL: 1 /HPF
UROBILINOGEN UR STRIP-MCNC: <2 MG/DL
WBC URINE: 0 /HPF

## 2023-08-23 PROCEDURE — 81001 URINALYSIS AUTO W/SCOPE: CPT | Mod: XU | Performed by: EMERGENCY MEDICINE

## 2023-08-23 PROCEDURE — 81025 URINE PREGNANCY TEST: CPT | Performed by: EMERGENCY MEDICINE

## 2023-08-23 PROCEDURE — 82962 GLUCOSE BLOOD TEST: CPT

## 2023-08-23 PROCEDURE — 80307 DRUG TEST PRSMV CHEM ANLYZR: CPT | Performed by: EMERGENCY MEDICINE

## 2023-08-23 PROCEDURE — 99281 EMR DPT VST MAYX REQ PHY/QHP: CPT

## 2023-08-23 ASSESSMENT — ACTIVITIES OF DAILY LIVING (ADL): ADLS_ACUITY_SCORE: 35

## 2023-08-23 NOTE — ED TRIAGE NOTES
Patient presents to ED with memory loss over several months, [facility] told her that she should get here right away and that she might have DM, BG of 91 in triage.  Also incontinent of bladder for the past 2 months.  Zoey Herr RN.......8/23/2023 5:45 PM     Triage Assessment       Row Name 08/23/23 6795       Triage Assessment (Adult)    Airway WDL WDL       Respiratory WDL    Respiratory WDL WDL       Skin Circulation/Temperature WDL    Skin Circulation/Temperature WDL WDL       Cardiac WDL    Cardiac WDL WDL       Peripheral/Neurovascular WDL    Peripheral Neurovascular WDL WDL       Cognitive/Neuro/Behavioral WDL    Cognitive/Neuro/Behavioral WDL WDL

## 2023-08-24 NOTE — ED NOTES
Pt requesting a 'brain scan' as they have been having issues with memory, pt unsure how long memory issues present for, states they take medication to help but unsure of names of medications.   Pt refused to have blood pressure checked

## 2023-08-24 NOTE — ED NOTES
"Patient stated she no longer wanted to wait, reports \"this is taking too long\" and walked out of department   "

## 2023-09-06 ENCOUNTER — HOSPITAL ENCOUNTER (EMERGENCY)
Facility: CLINIC | Age: 33
Discharge: HOME | End: 2023-09-06
Attending: EMERGENCY MEDICINE | Admitting: EMERGENCY MEDICINE
Payer: MEDICARE

## 2023-09-06 VITALS
BODY MASS INDEX: 22.31 KG/M2 | DIASTOLIC BLOOD PRESSURE: 87 MMHG | SYSTOLIC BLOOD PRESSURE: 117 MMHG | TEMPERATURE: 98 F | HEART RATE: 66 BPM | OXYGEN SATURATION: 99 % | WEIGHT: 130 LBS | RESPIRATION RATE: 16 BRPM

## 2023-09-06 DIAGNOSIS — K92.1 BLOOD IN STOOL: ICD-10-CM

## 2023-09-06 PROBLEM — R10.30 LOWER ABDOMINAL PAIN: Status: ACTIVE | Noted: 2023-09-06

## 2023-09-06 PROBLEM — F15.10 STIMULANT ABUSE (H): Status: ACTIVE | Noted: 2023-02-17

## 2023-09-06 PROBLEM — F41.1 ANXIETY STATE: Status: ACTIVE | Noted: 2023-09-06

## 2023-09-06 LAB
ALBUMIN SERPL-MCNC: 4.6 G/DL (ref 3.5–5)
ALP SERPL-CCNC: 44 U/L (ref 45–120)
ALT SERPL W P-5'-P-CCNC: 12 U/L (ref 0–45)
ANION GAP SERPL CALCULATED.3IONS-SCNC: 6 MMOL/L (ref 5–18)
APTT PPP: 29 SECONDS (ref 22–38)
AST SERPL W P-5'-P-CCNC: 16 U/L (ref 0–40)
BILIRUB SERPL-MCNC: 0.2 MG/DL (ref 0–1)
BUN SERPL-MCNC: 9 MG/DL (ref 8–22)
CALCIUM SERPL-MCNC: 9 MG/DL (ref 8.5–10.5)
CHLORIDE BLD-SCNC: 107 MMOL/L (ref 98–107)
CO2 SERPL-SCNC: 25 MMOL/L (ref 22–31)
CREAT SERPL-MCNC: 0.67 MG/DL (ref 0.6–1.1)
EGFRCR SERPLBLD CKD-EPI 2021: >90 ML/MIN/1.73M2
ERYTHROCYTE [DISTWIDTH] IN BLOOD BY AUTOMATED COUNT: 12.6 % (ref 10–15)
GLUCOSE BLD-MCNC: 85 MG/DL (ref 70–125)
HCT VFR BLD AUTO: 41.2 % (ref 35–47)
HGB BLD-MCNC: 13.7 G/DL (ref 11.7–15.7)
INR PPP: 0.94 (ref 0.85–1.15)
MCH RBC QN AUTO: 29.9 PG (ref 26.5–33)
MCHC RBC AUTO-ENTMCNC: 33.3 G/DL (ref 31.5–36.5)
MCV RBC AUTO: 90 FL (ref 78–100)
PLATELET # BLD AUTO: 266 10E3/UL (ref 150–450)
POTASSIUM BLD-SCNC: 3.8 MMOL/L (ref 3.5–5)
PROT SERPL-MCNC: 7.2 G/DL (ref 6–8)
RBC # BLD AUTO: 4.58 10E6/UL (ref 3.8–5.2)
SODIUM SERPL-SCNC: 138 MMOL/L (ref 136–145)
WBC # BLD AUTO: 8.5 10E3/UL (ref 4–11)

## 2023-09-06 PROCEDURE — 85730 THROMBOPLASTIN TIME PARTIAL: CPT | Performed by: EMERGENCY MEDICINE

## 2023-09-06 PROCEDURE — 99283 EMERGENCY DEPT VISIT LOW MDM: CPT

## 2023-09-06 PROCEDURE — 85014 HEMATOCRIT: CPT | Performed by: EMERGENCY MEDICINE

## 2023-09-06 PROCEDURE — 36415 COLL VENOUS BLD VENIPUNCTURE: CPT | Performed by: EMERGENCY MEDICINE

## 2023-09-06 PROCEDURE — 85610 PROTHROMBIN TIME: CPT | Performed by: EMERGENCY MEDICINE

## 2023-09-06 PROCEDURE — 80053 COMPREHEN METABOLIC PANEL: CPT | Performed by: EMERGENCY MEDICINE

## 2023-09-06 ASSESSMENT — ENCOUNTER SYMPTOMS
ABDOMINAL PAIN: 1
FEVER: 0
DIARRHEA: 0
CONSTIPATION: 1
BLOOD IN STOOL: 1

## 2023-09-06 NOTE — ED TRIAGE NOTES
Bright red rectal bleeding on/ff for 2d.  States she has burning in rectal area.  Last BM today.  States she has slight abd pain     Triage Assessment       Row Name 09/06/23 0817       Triage Assessment (Adult)    Airway WDL WDL       Respiratory WDL    Respiratory WDL WDL       Skin Circulation/Temperature WDL    Skin Circulation/Temperature WDL WDL       Cardiac WDL    Cardiac WDL WDL       Peripheral/Neurovascular WDL    Peripheral Neurovascular WDL WDL       Cognitive/Neuro/Behavioral WDL    Cognitive/Neuro/Behavioral WDL WDL

## 2023-09-07 NOTE — ED PROVIDER NOTES
EMERGENCY DEPARTMENT ENCOUNTER      NAME: Shannon Christopher  AGE: 33 year old female  YOB: 1990  MRN: [id number]  EVALUATION DATE & TIME: 9/6/2023  9:50 PM    PCP: Sveta Sandoval    ED PROVIDER: Kervin Ferguson MD        Chief Complaint   Patient presents with    Melena         FINAL IMPRESSION:  1. Blood in stool          ED COURSE & MEDICAL DECISION MAKING:    Pertinent Labs & Imaging studies reviewed. (See chart for details)  33 year old female presents to the Emergency Department for evaluation of right red blood per rectum as well as some burning in the rectal area.  Last bowel movement today.  Denies any diarrhea.  Denies any fever.  As previously's been seen this summer and had CT imaging done showing colitis.  Had CT imaging done last month that showed resolution of colitis.  Did refer to GI in the scheduled to see them next month.  Patient history and exam concern for return of colitis.  Really need to see GI.  Does not have stool testing done.  Not able to leave stool sample here.  Discussed need to follow primary care doctor for stool sample.  Highly doubt ischemic colitis.  Highly doubt infectious colitis but do recommend stool testing however patient was not able to leave a stool sample.  Ultimately is to follow-up with GI as scheduled.  I did create a more urgent referral hopefully that will help patient get in and see GI.    Based on history and exam I feel repeat imaging is not indicated since has had multiple CTs recently I feel repeat CT imaging is not can help with a diagnosis ultimately to see GI and have a colonoscopy done.  Hemoglobin stable        ED Course as of 09/06/23 2309   Wed Sep 06, 2023   2308 WBC: 8.5   2308 Hemoglobin: 13.7   2308 Platelet Count: 266   2308 INR: 0.94   2309 PTT: 29   2309 Sodium: 138   2309 Carbon Dioxide: 25   2309 Creatinine: 0.67   2309 Calcium: 9.0   2309 Glucose: 85   2309 AST: 16   2309 ALT: 12   2309 Bilirubin Total: 0.2   2309 GFR Estimate: >90  "    10:00 PM I met with the patient to gather history and to perform my initial exam. I discussed the plan for care while in the Emergency Department. Appropriate PPE was worn during patient encounters.     Medical Decision Making    History:  Supplemental history from: Documented in chart, if applicable  External Record(s) reviewed: Inpatient Record: 6/10 & 7/28    Work Up:  Chart documentation includes differential considered and any EKGs or imaging independently interpreted by provider, where specified.  In additional to work up documented, I considered the following work up: Documented in chart, if applicable.    External consultation:  Discussion of management with another provider: Documented in chart, if applicable    Complicating factors:  Care impacted by chronic illness: Mental Health and Other: Cerebral Palsy & Borderline Intellectual Disability  Care affected by social determinants of health: Access to Medical Care    Disposition considerations: Discharge. No recommendations on prescription strength medication(s). N/A.          Voice recognition software was used in the creation of this note. Any grammatical or nonsensical errors are due to inherent errors with the software and are not the intention of the writer.         At the conclusion of the encounter I discussed the results of all of the tests and the disposition. The questions were answered. The patient or family acknowledged understanding and was agreeable with the care plan.           MEDICATIONS GIVEN IN THE EMERGENCY:  Medications - No data to display    NEW PRESCRIPTIONS STARTED AT TODAY'S ER VISIT  Discharge Medication List as of 9/6/2023 10:12 PM             =================================================================    HPI    Triage note  \"Bright red rectal bleeding on/ff for 2d.  States she has burning in rectal area.  Last BM today.  States she has slight abd pain     Triage Assessment       Row Name 09/06/23 1847       Triage " "Assessment (Adult)    Airway WDL WDL       Respiratory WDL    Respiratory WDL WDL       Skin Circulation/Temperature WDL    Skin Circulation/Temperature WDL WDL       Cardiac WDL    Cardiac WDL WDL       Peripheral/Neurovascular WDL    Peripheral Neurovascular WDL WDL       Cognitive/Neuro/Behavioral WDL    Cognitive/Neuro/Behavioral WDL WDL                    \"      Patient information was obtained from: Patient    Use of : N/A         Shannon Christopher is a 33 year old female with a pertinent history of borderline personality disorder, borderline intellectual disability, and bipolar disorder who presents to this ED via private vehicle for evaluation of abdominal pain and melena.    Per chart review, patient was seen on 7/28 (~1 month ago) at [facility] ED for colitis. CT with fluid content in lower sigmoid colon and rectum and a 2 mm left kidney stone.  Patient discharged in stable condition.    Per chart review, patient was seen on 6/10 (~3 months ago) at [facility] ED for constipation. CT unremarkable. Patient prescribed Zofran and OTC laxatives and discharged in stable condition.    Patient states that she has been having abdominal issues including abdominal pain and constipation. She is taking a powder laxative and metformin which has helped her constipation. Last bowel movement was prior to arrival. Patient notes that she noticed blood in her stool when wiping prior to arrival. Denies fever and diarrhea.      REVIEW OF SYSTEMS   Review of Systems   Constitutional:  Negative for fever.   Gastrointestinal:  Positive for abdominal pain, blood in stool (melena) and constipation. Negative for diarrhea.       PAST MEDICAL HISTORY:  Past Medical History:   Diagnosis Date    ADD (attention deficit disorder)     Anxiety     Borderline mental retardation     Borderline personality disorder (H)     Cerebral palsy (H)     Depression     PONV (postoperative nausea and vomiting)     Uterine fibroid        PAST " SURGICAL HISTORY:  Past Surgical History:   Procedure Laterality Date    ADENOIDECTOMY      APPLY EXTERNAL FIXATOR LOWER EXTREMITY Left 5/18/2021    Procedure: CLOSED REDUCTION EXTERNAL FIXATION, LEFT ANKLE FRACTURE;  Surgeon: Dereck Nielson DO;  Location: [facility];  Service: Orthopedics    BUNIONECTOMY      EYE MUSCLE SURGERY      FOOT CAPSULE RELEASE W/ PERCUTANEOUS HEEL CORD LENGTHENING, TIBIAL TENDON TRANSFER Right     LAPAROSCOPIC HYSTERECTOMY N/A 1/4/2019    Procedure: ROBOTIC TOTAL LAPAROSCOPIC HYSTERECTOMY, BILATERAL SALPINGECTOMY LYSIS OF ADHESIONS;  Surgeon: Jose Golden MD;  Location: [facility] OR;  Service: Gynecology    OPEN REDUCTION INTERNAL FIXATION ANKLE Left 5/28/2021    Procedure: WITH OPEN REDUCTION INTERNAL FIXATION, TRIMALLEOLAR, LEFT ANKLE;  Surgeon: Dereck Costa DO;  Location: [facility] OR;  Service: Orthopedics    REMOVE HARDWARE LOWER EXTREMITY Left 5/28/2021    Procedure: LEFT ANKLE EXTERNAL FIXATION REMOVAL;  Surgeon: Dereck Costa DO;  Location: [facility];  Service: Orthopedics    TYMPANOPLASTY             CURRENT MEDICATIONS:    acetaminophen (TYLENOL) 500 MG tablet  aspirin 81 MG EC tablet  cyclobenzaprine (FLEXERIL) 10 MG tablet  divalproex (DEPAKOTE ER) 500 MG 24 hour tablet  estradioL (ESTRACE) 2 MG tablet  HYDROmorphone (DILAUDID) 2 MG tablet  hydrOXYzine HCL (ATARAX) 25 MG tablet  ketorolac (TORADOL) 10 mg tablet  LATUDA 60 mg Tab tablet  nystatin (MYCOSTATIN) cream  OLANZapine (ZYPREXA) 10 MG tablet  OLANZapine (ZYPREXA) 7.5 MG tablet  ondansetron (ZOFRAN ODT) 4 MG ODT tab  polyethylene glycol (MIRALAX) 17 gram packet  senna-docusate (PERICOLACE) 8.6-50 mg tablet  zonisamide (ZONEGRAN) 100 MG capsule        ALLERGIES:  No Known Allergies    FAMILY HISTORY:  History reviewed. No pertinent family history.    SOCIAL HISTORY:   Social History     Socioeconomic History    Marital status: Single   Tobacco Use    Smoking status:  Some Days    Smokeless tobacco: Never    Tobacco comments:     < 1 PPW   Substance and Sexual Activity    Alcohol use: Yes     Comment: Alcoholic Drinks/day: rarely    Drug use: No   Social History Narrative    Patient arrived alone to the ED 08/13/17         VITALS:  /87   Pulse 66   Temp 98  F (36.7  C) (Oral)   Resp 16   Wt 59 kg (130 lb)   SpO2 99%   BMI 22.31 kg/m      PHYSICAL EXAM      Vitals: /87   Pulse 66   Temp 98  F (36.7  C) (Oral)   Resp 16   Wt 59 kg (130 lb)   SpO2 99%   BMI 22.31 kg/m    General: Appears in no acute distress, awake, alert, interactive.  Eyes: Conjunctivae non-injected. Sclera anicteric.  HENT: Atraumatic.  Neck: Supple.  Respiratory/Chest: Respiration unlabored.  Abdomen: non distended, no guarding or rigidity.  No tenderness  Musculoskeletal: Normal extremities. No edema or erythema.  Skin: Normal color. No rash or diaphoresis.  Neurologic: Face symmetric, moves all extremities spontaneously. Speech clear.  Psychiatric: Oriented to person, place, and time. Affect appropriate.       LAB:  All pertinent labs reviewed and interpreted.  Results for orders placed or performed during the hospital encounter of 09/06/23   CBC (+ platelets, no diff)   Result Value Ref Range    WBC Count 8.5 4.0 - 11.0 10e3/uL    RBC Count 4.58 3.80 - 5.20 10e6/uL    Hemoglobin 13.7 11.7 - 15.7 g/dL    Hematocrit 41.2 35.0 - 47.0 %    MCV 90 78 - 100 fL    MCH 29.9 26.5 - 33.0 pg    MCHC 33.3 31.5 - 36.5 g/dL    RDW 12.6 10.0 - 15.0 %    Platelet Count 266 150 - 450 10e3/uL   Comprehensive metabolic panel   Result Value Ref Range    Sodium 138 136 - 145 mmol/L    Potassium 3.8 3.5 - 5.0 mmol/L    Chloride 107 98 - 107 mmol/L    Carbon Dioxide (CO2) 25 22 - 31 mmol/L    Anion Gap 6 5 - 18 mmol/L    Urea Nitrogen 9 8 - 22 mg/dL    Creatinine 0.67 0.60 - 1.10 mg/dL    Calcium 9.0 8.5 - 10.5 mg/dL    Glucose 85 70 - 125 mg/dL    Alkaline Phosphatase 44 (L) 45 - 120 U/L    AST 16 0 - 40  U/L    ALT 12 0 - 45 U/L    Protein Total 7.2 6.0 - 8.0 g/dL    Albumin 4.6 3.5 - 5.0 g/dL    Bilirubin Total 0.2 0.0 - 1.0 mg/dL    GFR Estimate >90 >60 mL/min/1.73m2   Result Value Ref Range    INR 0.94 0.85 - 1.15   Result Value Ref Range    aPTT 29 22 - 38 Seconds       RADIOLOGY:  Reviewed all pertinent imaging. Please see official radiology report.  No orders to display             I, Indiana Titus, am serving as a scribe to document services personally performed by Gaston Ferguson MD based on my observation and the provider's statements to me. I, Dr. Gaston Ferguson, attest that Indiana Titus is acting in a scribe capacity, has observed my performance of the services and has documented them in accordance with my direction.    Gaston Ferguson MD  Emergency Medicine  [facility] EMERGENCY ROOM  [facility] [facility] [address] [phone]       Gaston Ferguson MD  09/06/23 4648

## 2023-09-07 NOTE — DISCHARGE INSTRUCTIONS
As we discussed you have had recent CT imaging therefore I feel repeat CT imaging tonight is not indicated.  Do recommend seeing a primary care doctor and have your primary care Dr. Gaffney some stool test.  I did place a urgent referral to GI.  I feel he need to be evaluated for inflammatory bowel disease.  Return to the ER for fever, vomiting, worsening bleeding or worsening pain.  Consinue stool softeners if you are still struggling with constipation

## 2023-09-21 ENCOUNTER — LAB REQUISITION (OUTPATIENT)
Dept: LAB | Facility: CLINIC | Age: 33
End: 2023-09-21
Payer: MEDICARE

## 2023-09-21 DIAGNOSIS — Z11.3 ENCOUNTER FOR SCREENING FOR INFECTIONS WITH A PREDOMINANTLY SEXUAL MODE OF TRANSMISSION: ICD-10-CM

## 2023-09-21 LAB — T PALLIDUM AB SER QL: NONREACTIVE

## 2023-09-21 PROCEDURE — 86780 TREPONEMA PALLIDUM: CPT | Mod: ORL | Performed by: OBSTETRICS & GYNECOLOGY

## 2023-09-21 PROCEDURE — 87491 CHLMYD TRACH DNA AMP PROBE: CPT | Mod: ORL | Performed by: OBSTETRICS & GYNECOLOGY

## 2023-09-21 PROCEDURE — 86803 HEPATITIS C AB TEST: CPT | Mod: ORL | Performed by: OBSTETRICS & GYNECOLOGY

## 2023-09-21 PROCEDURE — 87340 HEPATITIS B SURFACE AG IA: CPT | Mod: ORL | Performed by: OBSTETRICS & GYNECOLOGY

## 2023-09-21 PROCEDURE — 87389 HIV-1 AG W/HIV-1&-2 AB AG IA: CPT | Mod: ORL | Performed by: OBSTETRICS & GYNECOLOGY

## 2023-09-21 PROCEDURE — 87591 N.GONORRHOEAE DNA AMP PROB: CPT | Mod: ORL | Performed by: OBSTETRICS & GYNECOLOGY

## 2023-09-22 LAB
C TRACH DNA SPEC QL PROBE+SIG AMP: NEGATIVE
HBV SURFACE AG SERPL QL IA: NONREACTIVE
HCV AB SERPL QL IA: NONREACTIVE
HIV 1+2 AB+HIV1 P24 AG SERPL QL IA: NONREACTIVE
N GONORRHOEA DNA SPEC QL NAA+PROBE: NEGATIVE

## 2023-11-08 ENCOUNTER — HOSPITAL ENCOUNTER (EMERGENCY)
Facility: CLINIC | Age: 33
Discharge: LEFT BEFORE DISCHARGE | End: 2023-11-08
Attending: EMERGENCY MEDICINE
Payer: MEDICARE

## 2023-11-08 VITALS
WEIGHT: 118 LBS | OXYGEN SATURATION: 99 % | HEIGHT: 62 IN | SYSTOLIC BLOOD PRESSURE: 117 MMHG | DIASTOLIC BLOOD PRESSURE: 70 MMHG | HEART RATE: 80 BPM | TEMPERATURE: 97.5 F | RESPIRATION RATE: 18 BRPM | BODY MASS INDEX: 21.71 KG/M2

## 2023-11-08 NOTE — ED TRIAGE NOTES
"States has notice blood when wiping self all day today. States that her \"butt is on fire\"  Has colonscopy scheduled on 11/7.  States afraid to have a BM incase \"my intestines fall out\".  Anxious     Triage Assessment (Adult)       Row Name 11/08/23 3314          Triage Assessment    Airway WDL WDL        Respiratory WDL    Respiratory WDL WDL        Skin Circulation/Temperature WDL    Skin Circulation/Temperature WDL WDL                     "

## 2023-12-19 ENCOUNTER — LAB REQUISITION (OUTPATIENT)
Dept: LAB | Facility: CLINIC | Age: 33
End: 2023-12-19
Payer: MEDICARE

## 2023-12-19 DIAGNOSIS — Z12.4 ENCOUNTER FOR SCREENING FOR MALIGNANT NEOPLASM OF CERVIX: ICD-10-CM

## 2023-12-19 PROCEDURE — 87624 HPV HI-RISK TYP POOLED RSLT: CPT | Mod: ORL,GA | Performed by: OBSTETRICS & GYNECOLOGY

## 2023-12-19 PROCEDURE — G0145 SCR C/V CYTO,THINLAYER,RESCR: HCPCS | Mod: ORL | Performed by: OBSTETRICS & GYNECOLOGY

## 2024-04-18 ENCOUNTER — HOSPITAL ENCOUNTER (EMERGENCY)
Facility: CLINIC | Age: 34
Discharge: HOME | End: 2024-04-18
Attending: EMERGENCY MEDICINE | Admitting: EMERGENCY MEDICINE
Payer: MEDICARE

## 2024-04-18 VITALS
HEIGHT: 62 IN | SYSTOLIC BLOOD PRESSURE: 130 MMHG | RESPIRATION RATE: 20 BRPM | HEART RATE: 84 BPM | TEMPERATURE: 97.8 F | OXYGEN SATURATION: 98 % | DIASTOLIC BLOOD PRESSURE: 80 MMHG | WEIGHT: 123 LBS | BODY MASS INDEX: 22.63 KG/M2

## 2024-04-18 DIAGNOSIS — Z86.59 HISTORY OF BIPOLAR DISORDER: ICD-10-CM

## 2024-04-18 DIAGNOSIS — Z76.0 ENCOUNTER FOR MEDICATION REFILL: ICD-10-CM

## 2024-04-18 PROCEDURE — 99283 EMERGENCY DEPT VISIT LOW MDM: CPT

## 2024-04-18 RX ORDER — OLANZAPINE 7.5 MG/1
7.5 TABLET, FILM COATED ORAL AT BEDTIME
Qty: 14 TABLET | Refills: 0 | Status: SHIPPED | OUTPATIENT
Start: 2024-04-18

## 2024-04-18 ASSESSMENT — COLUMBIA-SUICIDE SEVERITY RATING SCALE - C-SSRS
1. IN THE PAST MONTH, HAVE YOU WISHED YOU WERE DEAD OR WISHED YOU COULD GO TO SLEEP AND NOT WAKE UP?: NO
2. HAVE YOU ACTUALLY HAD ANY THOUGHTS OF KILLING YOURSELF IN THE PAST MONTH?: NO
6. HAVE YOU EVER DONE ANYTHING, STARTED TO DO ANYTHING, OR PREPARED TO DO ANYTHING TO END YOUR LIFE?: NO

## 2024-04-19 NOTE — ED TRIAGE NOTES
"To ED per POV, accompanied by friend    C/o \"severe agitation\" dt need for olanzapine 7.5mg q day to be refilled     Triage Assessment (Adult)       Row Name 04/18/24 2045          Triage Assessment    Airway WDL WDL        Respiratory WDL    Respiratory WDL WDL        Skin Circulation/Temperature WDL    Skin Circulation/Temperature WDL WDL        Cardiac WDL    Cardiac WDL WDL        Peripheral/Neurovascular WDL    Peripheral Neurovascular WDL WDL        Cognitive/Neuro/Behavioral WDL    Cognitive/Neuro/Behavioral WDL WDL                     "

## 2024-04-19 NOTE — DISCHARGE INSTRUCTIONS
Please work through your clinic doctors for additional management of your olanzapine.  Please see your doctors within the next week for further evaluation and management of your refills  The pills that you currently have are 2.5 mg, which is one third of your normal dose.  You could take 3 tablets, which would equal 7.5 mg, daily.  Do not take in combination with the medication I have prescribed today.  Take 1 or the other.

## 2024-04-19 NOTE — ED PROVIDER NOTES
EMERGENCY DEPARTMENT ENCOUnter      NAME: Shannon Christopher  AGE: 34 year old female  YOB: 1990  MRN: [id number]  EVALUATION DATE & TIME: No admission date for patient encounter.    PCP: Sveta Sandoval    ED PROVIDER: Rebecca Ellis MD      Chief Complaint   Patient presents with    Withdrawal    Medication Refill         FINAL IMPRESSION:  1. Encounter for medication refill    2. History of bipolar disorder          ED COURSE & MEDICAL DECISION MAKING:      In summary, the patient is a 34-year-old bipolar patient that presents to the emergency department for refill of her Zyprexa.  I am happy to prescribe a short course and recommended that she see her primary care providers for additional prescription of her medications.  We discussed the importance of her working through her clinic doctors for her regular medications.    2100-evaluated patient.  We discussed that she could take 3 of the 2.5 mg tablets to equal her normal 7.5 mg dose.  The patient does have a history of developmental delay and was having a difficult time understanding this concept.  I did also provide this instruction in her discharge instructions.  I will prescribe a short course of her Zyprexa and I recommended that she follow-up with her clinic doctors for further management of her Zyprexa.    Medical Decision Making  Obtained supplemental history:Supplemental history obtained?: No  Reviewed external records: External records reviewed?: Documented in chart and Outpatient Record: I reviewed recent ED visit on April 13, in which she did not ask for a medication refill at this time.  Care impacted by chronic illness:Mental Health  Care significantly affected by social determinants of health:N/A  Did you consider but not order tests?: Work up considered but not performed and documented in chart, if applicable  Did you interpret images independently?: Independent interpretation of ECG and images noted in documentation, when  applicable.  Consultation discussion with other provider:Did you involve another provider (consultant, , pharmacy, etc.)?: No  Discharge. I prescribed additional prescription strength medication(s) as charted. See documentation for any additional details.      At the conclusion of the encounter I discussed the results of all of the tests and the disposition. The questions were answered. The patient or family acknowledged understanding and was agreeable with the care plan.         MEDICATIONS GIVEN IN THE EMERGENCY:  Medications - No data to display    NEW PRESCRIPTIONS STARTED AT TODAY'S ER VISIT  New Prescriptions    OLANZAPINE (ZYPREXA) 7.5 MG TABLET    Take 1 tablet (7.5 mg) by mouth at bedtime          =================================================================    HPI        Shannon Christopher is a 34 year old female with a pertinent history of borderline personality disorder and bipolar who presents to this ED for evaluation of a refill of her Zyprexa 7.5 mg.  She is not sure how she was able to run out of her medication.  She does have 2.5 mg tablets which she said are not effective, and she states she needs a higher dose.  She does not currently have an appointment to get a refill of her medications.  Review of her previous medical records does not reveal a frequent history of medication refills from the emergency department.      REVIEW OF SYSTEMS     Constitutional:  Denies fever or chills  HENT:  Denies sore throat   Respiratory:  Denies cough or shortness of breath   Cardiovascular:  Denies chest pain or palpitations  GI:  Denies abdominal pain, nausea, or vomiting  Musculoskeletal:  Denies any new extremity pain   Skin:  Denies rash   Neurologic:  Denies headache, focal weakness or sensory changes    All other systems reviewed and are negative      PAST MEDICAL HISTORY:  Past Medical History:   Diagnosis Date    ADD (attention deficit disorder)     Anxiety     Borderline mental retardation      Borderline personality disorder (H)     Cerebral palsy (H)     Depression     PONV (postoperative nausea and vomiting)     Uterine fibroid        PAST SURGICAL HISTORY:  Past Surgical History:   Procedure Laterality Date    ADENOIDECTOMY      APPLY EXTERNAL FIXATOR LOWER EXTREMITY Left 5/18/2021    Procedure: CLOSED REDUCTION EXTERNAL FIXATION, LEFT ANKLE FRACTURE;  Surgeon: Dereck Nielson DO;  Location: [facility];  Service: Orthopedics    BUNIONECTOMY      EYE MUSCLE SURGERY      FOOT CAPSULE RELEASE W/ PERCUTANEOUS HEEL CORD LENGTHENING, TIBIAL TENDON TRANSFER Right     LAPAROSCOPIC HYSTERECTOMY N/A 1/4/2019    Procedure: ROBOTIC TOTAL LAPAROSCOPIC HYSTERECTOMY, BILATERAL SALPINGECTOMY LYSIS OF ADHESIONS;  Surgeon: Jose Golden MD;  Location: [facility] OR;  Service: Gynecology    OPEN REDUCTION INTERNAL FIXATION ANKLE Left 5/28/2021    Procedure: WITH OPEN REDUCTION INTERNAL FIXATION, TRIMALLEOLAR, LEFT ANKLE;  Surgeon: Dereck Costa DO;  Location: [facility] OR;  Service: Orthopedics    REMOVE HARDWARE LOWER EXTREMITY Left 5/28/2021    Procedure: LEFT ANKLE EXTERNAL FIXATION REMOVAL;  Surgeon: Dereck Costa DO;  Location: [facility] OR;  Service: Orthopedics    TYMPANOPLASTY             CURRENT MEDICATIONS:    OLANZapine (ZYPREXA) 7.5 MG tablet  acetaminophen (TYLENOL) 500 MG tablet  aspirin 81 MG EC tablet  cyclobenzaprine (FLEXERIL) 10 MG tablet  divalproex (DEPAKOTE ER) 500 MG 24 hour tablet  estradioL (ESTRACE) 2 MG tablet  HYDROmorphone (DILAUDID) 2 MG tablet  hydrOXYzine HCL (ATARAX) 25 MG tablet  ketorolac (TORADOL) 10 mg tablet  LATUDA 60 mg Tab tablet  nystatin (MYCOSTATIN) cream  OLANZapine (ZYPREXA) 7.5 MG tablet  ondansetron (ZOFRAN ODT) 4 MG ODT tab  polyethylene glycol (MIRALAX) 17 gram packet  senna-docusate (PERICOLACE) 8.6-50 mg tablet  zonisamide (ZONEGRAN) 100 MG capsule        ALLERGIES:  No Known Allergies    FAMILY HISTORY:  History  "reviewed. No pertinent family history.    SOCIAL HISTORY:   Social History     Socioeconomic History    Marital status: Single     Spouse name: None    Number of children: None    Years of education: None    Highest education level: None   Tobacco Use    Smoking status: Some Days    Smokeless tobacco: Never    Tobacco comments:     < 1 PPW   Substance and Sexual Activity    Alcohol use: Yes     Comment: Alcoholic Drinks/day: rarely    Drug use: No   Social History Narrative    Patient arrived alone to the ED 08/13/17       Social Determinants of Health      [facility]    Financial Resource Strain    [facility]    Social Connections       VITALS:  Patient Vitals for the past 24 hrs:   BP Temp Temp src Pulse Resp SpO2 Height Weight   04/18/24 2044 130/80 97.8  F (36.6  C) Oral 84 20 98 % 1.575 m (5' 2\") 55.8 kg (123 lb)       PHYSICAL EXAM    Constitutional:  Well developed, Well nourished,  HENT:  Normocephalic, Atraumatic, Bilateral external ears normal, Oropharynx moist, Nose normal.   Neck:  Normal range of motion, No meningismus, No stridor.   Eyes:  EOMI, Conjunctiva normal, No discharge.   Respiratory:  Normal breath sounds, No respiratory distress, No wheezing, No chest tenderness.   Cardiovascular:  Normal heart rate, Normal rhythm, No murmurs  Musculoskeletal:  Neurovascularly intact distally, No edema, No tenderness, No cyanosis, Good range of motion in all major joints. No tenderness to palpation or major deformities noted.   Integument:  Warm, Dry, No erythema, No rash.   Lymphatic:  No lymphadenopathy noted.   Neurologic:  Alert & oriented , Normal motor function,  No focal deficits noted.   Psychiatric:  Affect normal, Judgment normal, Mood normal.                  Rebecca Ellis MD  Emergency " [facility] EMERGENCY ROOM  [address] [facility] [address] [address]  [phone]  Dept: [phone]       Rebecca Ellis MD  04/18/24 9420

## 2024-05-15 ENCOUNTER — HOSPITAL ENCOUNTER (EMERGENCY)
Facility: CLINIC | Age: 34
Discharge: HOME | End: 2024-05-15
Attending: EMERGENCY MEDICINE | Admitting: EMERGENCY MEDICINE
Payer: MEDICARE

## 2024-05-15 VITALS
WEIGHT: 116 LBS | HEIGHT: 62 IN | RESPIRATION RATE: 23 BRPM | BODY MASS INDEX: 21.35 KG/M2 | TEMPERATURE: 97.3 F | SYSTOLIC BLOOD PRESSURE: 136 MMHG | OXYGEN SATURATION: 100 % | DIASTOLIC BLOOD PRESSURE: 89 MMHG | HEART RATE: 72 BPM

## 2024-05-15 DIAGNOSIS — R40.4 SPELL OF ALTERED CONSCIOUSNESS: ICD-10-CM

## 2024-05-15 PROBLEM — K92.1 HEMATOCHEZIA: Status: ACTIVE | Noted: 2024-05-15

## 2024-05-15 PROBLEM — K64.9 HEMORRHOIDS: Status: ACTIVE | Noted: 2023-11-17

## 2024-05-15 PROBLEM — K92.1 MELENA: Status: ACTIVE | Noted: 2023-10-05

## 2024-05-15 PROBLEM — K63.5 POLYP OF COLON: Status: ACTIVE | Noted: 2023-11-17

## 2024-05-15 LAB
ALBUMIN SERPL BCG-MCNC: 4.3 G/DL (ref 3.5–5.2)
ALBUMIN UR-MCNC: NEGATIVE MG/DL
ALP SERPL-CCNC: 49 U/L (ref 40–150)
ALT SERPL W P-5'-P-CCNC: 6 U/L (ref 0–50)
AMMONIA PLAS-SCNC: 26 UMOL/L (ref 11–51)
ANION GAP SERPL CALCULATED.3IONS-SCNC: 13 MMOL/L (ref 7–15)
APPEARANCE UR: ABNORMAL
AST SERPL W P-5'-P-CCNC: 20 U/L (ref 0–45)
ATRIAL RATE - MUSE: 77 BPM
BACTERIA #/AREA URNS HPF: ABNORMAL /HPF
BASOPHILS # BLD AUTO: 0 10E3/UL (ref 0–0.2)
BASOPHILS NFR BLD AUTO: 1 %
BILIRUB DIRECT SERPL-MCNC: <0.2 MG/DL (ref 0–0.3)
BILIRUB SERPL-MCNC: 0.2 MG/DL
BILIRUB UR QL STRIP: NEGATIVE
BUN SERPL-MCNC: 13.7 MG/DL (ref 6–20)
CALCIUM SERPL-MCNC: 8.9 MG/DL (ref 8.6–10)
CHLORIDE SERPL-SCNC: 105 MMOL/L (ref 98–107)
COLOR UR AUTO: ABNORMAL
CREAT SERPL-MCNC: 0.64 MG/DL (ref 0.51–0.95)
DEPRECATED HCO3 PLAS-SCNC: 20 MMOL/L (ref 22–29)
DIASTOLIC BLOOD PRESSURE - MUSE: NORMAL MMHG
EGFRCR SERPLBLD CKD-EPI 2021: >90 ML/MIN/1.73M2
EOSINOPHIL # BLD AUTO: 0.1 10E3/UL (ref 0–0.7)
EOSINOPHIL NFR BLD AUTO: 1 %
ERYTHROCYTE [DISTWIDTH] IN BLOOD BY AUTOMATED COUNT: 13.2 % (ref 10–15)
GLUCOSE SERPL-MCNC: 94 MG/DL (ref 70–99)
GLUCOSE UR STRIP-MCNC: NEGATIVE MG/DL
HCT VFR BLD AUTO: 39.4 % (ref 35–47)
HGB BLD-MCNC: 13.3 G/DL (ref 11.7–15.7)
HGB UR QL STRIP: NEGATIVE
IMM GRANULOCYTES # BLD: 0 10E3/UL
IMM GRANULOCYTES NFR BLD: 0 %
INTERPRETATION ECG - MUSE: NORMAL
KETONES UR STRIP-MCNC: ABNORMAL MG/DL
LEUKOCYTE ESTERASE UR QL STRIP: NEGATIVE
LIPASE SERPL-CCNC: 36 U/L (ref 13–60)
LYMPHOCYTES # BLD AUTO: 2.4 10E3/UL (ref 0.8–5.3)
LYMPHOCYTES NFR BLD AUTO: 37 %
MAGNESIUM SERPL-MCNC: 2.1 MG/DL (ref 1.7–2.3)
MCH RBC QN AUTO: 30.4 PG (ref 26.5–33)
MCHC RBC AUTO-ENTMCNC: 33.8 G/DL (ref 31.5–36.5)
MCV RBC AUTO: 90 FL (ref 78–100)
MONOCYTES # BLD AUTO: 0.4 10E3/UL (ref 0–1.3)
MONOCYTES NFR BLD AUTO: 7 %
MUCOUS THREADS #/AREA URNS LPF: PRESENT /LPF
NEUTROPHILS # BLD AUTO: 3.5 10E3/UL (ref 1.6–8.3)
NEUTROPHILS NFR BLD AUTO: 54 %
NITRATE UR QL: NEGATIVE
NRBC # BLD AUTO: 0 10E3/UL
NRBC BLD AUTO-RTO: 0 /100
P AXIS - MUSE: 45 DEGREES
PH UR STRIP: 7 [PH] (ref 5–7)
PLATELET # BLD AUTO: 251 10E3/UL (ref 150–450)
POTASSIUM SERPL-SCNC: 3.5 MMOL/L (ref 3.4–5.3)
PR INTERVAL - MUSE: 150 MS
PROT SERPL-MCNC: 6.6 G/DL (ref 6.4–8.3)
QRS DURATION - MUSE: 82 MS
QT - MUSE: 404 MS
QTC - MUSE: 457 MS
R AXIS - MUSE: 94 DEGREES
RBC # BLD AUTO: 4.37 10E6/UL (ref 3.8–5.2)
RBC URINE: 0 /HPF
SODIUM SERPL-SCNC: 138 MMOL/L (ref 135–145)
SP GR UR STRIP: 1.01 (ref 1–1.03)
SQUAMOUS EPITHELIAL: 3 /HPF
SYSTOLIC BLOOD PRESSURE - MUSE: NORMAL MMHG
T AXIS - MUSE: 33 DEGREES
TROPONIN T SERPL HS-MCNC: <6 NG/L
UROBILINOGEN UR STRIP-MCNC: <2 MG/DL
VALPROATE SERPL-MCNC: 37.6 UG/ML
VENTRICULAR RATE- MUSE: 77 BPM
WBC # BLD AUTO: 6.5 10E3/UL (ref 4–11)
WBC URINE: <1 /HPF

## 2024-05-15 PROCEDURE — 93005 ELECTROCARDIOGRAM TRACING: CPT

## 2024-05-15 PROCEDURE — 99284 EMERGENCY DEPT VISIT MOD MDM: CPT | Mod: 25

## 2024-05-15 PROCEDURE — 80048 BASIC METABOLIC PNL TOTAL CA: CPT

## 2024-05-15 PROCEDURE — 82248 BILIRUBIN DIRECT: CPT

## 2024-05-15 PROCEDURE — 83690 ASSAY OF LIPASE: CPT

## 2024-05-15 PROCEDURE — 96360 HYDRATION IV INFUSION INIT: CPT

## 2024-05-15 PROCEDURE — 81001 URINALYSIS AUTO W/SCOPE: CPT

## 2024-05-15 PROCEDURE — 84484 ASSAY OF TROPONIN QUANT: CPT

## 2024-05-15 PROCEDURE — 36415 COLL VENOUS BLD VENIPUNCTURE: CPT

## 2024-05-15 PROCEDURE — 80053 COMPREHEN METABOLIC PANEL: CPT

## 2024-05-15 PROCEDURE — 85025 COMPLETE CBC W/AUTO DIFF WBC: CPT

## 2024-05-15 PROCEDURE — 36415 COLL VENOUS BLD VENIPUNCTURE: CPT | Performed by: EMERGENCY MEDICINE

## 2024-05-15 PROCEDURE — 258N000003 HC RX IP 258 OP 636

## 2024-05-15 PROCEDURE — 80164 ASSAY DIPROPYLACETIC ACD TOT: CPT | Performed by: EMERGENCY MEDICINE

## 2024-05-15 PROCEDURE — 82140 ASSAY OF AMMONIA: CPT | Performed by: EMERGENCY MEDICINE

## 2024-05-15 PROCEDURE — 83735 ASSAY OF MAGNESIUM: CPT

## 2024-05-15 RX ORDER — OLANZAPINE 2.5 MG/1
2.5 TABLET, FILM COATED ORAL DAILY PRN
COMMUNITY
Start: 2024-05-01

## 2024-05-15 RX ORDER — METFORMIN HCL 500 MG
500 TABLET, EXTENDED RELEASE 24 HR ORAL 2 TIMES DAILY WITH MEALS
COMMUNITY
Start: 2024-03-12

## 2024-05-15 RX ADMIN — SODIUM CHLORIDE 1000 ML: 9 INJECTION, SOLUTION INTRAVENOUS at 17:34

## 2024-05-15 ASSESSMENT — ACTIVITIES OF DAILY LIVING (ADL)
ADLS_ACUITY_SCORE: 33
ADLS_ACUITY_SCORE: 35

## 2024-05-15 NOTE — ED TRIAGE NOTES
Pt arrives to ED with c/o three days of having near syncopal episodes or possible seizures. Pt endorses to increased weakness, fatigue, chills and body aches. Friend with pt states when pt has these episodes she is not coherent or responsive. Last episode at 1634.      Triage Assessment (Adult)       Row Name 05/15/24 9106          Triage Assessment    Airway WDL WDL        Respiratory WDL    Respiratory WDL WDL        Skin Circulation/Temperature WDL    Skin Circulation/Temperature WDL WDL        Cardiac WDL    Cardiac WDL WDL        Peripheral/Neurovascular WDL    Peripheral Neurovascular WDL WDL        Cognitive/Neuro/Behavioral WDL    Cognitive/Neuro/Behavioral WDL WDL

## 2024-05-15 NOTE — DISCHARGE INSTRUCTIONS
Rest. Stay well hydrated. It is important you do NOT drive until you are evaluated by neurology.    Please follow up with neurology for reevaluation and ongoing management, including an EEG - referral placed today.    Return to the ER if you develop any increase in episodes, episodes that last >5 minutes, headache, vision changes, confusion, vomiting, dizziness, weakness/numbness/tingling of arms or legs, facial droop, speech difficulty, chest pain, shortness of breath, or other new symptoms.    Take Care!  - Marie Celaya PA-C

## 2024-05-15 NOTE — ED PROVIDER NOTES
Emergency Department Midlevel Supervisory Note     I had a face to face encounter with this patient seen by the Advanced Practice Provider (JESSICA). I personally made/approved the management plan and take responsibility for the patient management. I personally saw patient and performed a substantive portion of the visit including all aspects of the medical decision making.     ED Course:  5:35 PM Marie Celaya PA-C staffed patient with me. I agree with their assessment and plan of management, and I will see the patient.  5:45 PM I met with the patient to introduce myself, gather additional history, perform my initial exam, and discuss the plan.     MDM:  ED Course as of 05/15/24 1844   Wed May 15, 2024   1718 JESSICA supervisory note: 35 yo F with 3-4 days of spells of decreased alertness, confusion. Urinary and bowel incontinence as well.   1730 EKG shows sinus rhythm with a rate of 77.  No acute ischemic ST or T wave morphology.  Normal axis, normal intervals.  When compared to prior EKG on February 10, 2010 23, there is no significant change.  Impression: Sinus rhythm without evidence of acute ischemia or arrhythmia.   As of last week, tobacco level was normal.  Ammonia level here was normal.  Spells could be epileptic/absence, but no history of seizure disorder, no changes in medications, no recent head trauma.  GCS of 15 here, ambulatory without difficulty.  Spells witnessed here.  Neurology was consulted, recommends outpatient workup which I think is reasonable given her low probability of seizures, but return precautions were carefully discussed.  Differential also includes psychogenic spells caused by increased stress, mood.  No sign of heart failure, arrhythmia, anemia or sepsis that would raise suspicion for emergent physiological pathology.        1. Spell of altered consciousness        Brief HPI:     Shannon Christopher is a 34 year old female who presents for evaluation of three days of having near syncopal  "episodes or possible seizures. Pt endorses to increased weakness, fatigue, chills and body aches       Brief Physical Exam: /89   Pulse 79   Temp 97.3  F (36.3  C) (Temporal)   Resp 18   Ht 1.575 m (5' 2\")   Wt 52.6 kg (116 lb)   SpO2 100%   BMI 21.22 kg/m    Constitutional:  Alert, in no acute distress  EYES: Conjunctivae clear  HENT:  Atraumatic  Respiratory:  Respirations even, unlabored, in no acute respiratory distress  Cardiovascular:  Regular rate and rhythm, good peripheral perfusion  Musculoskeletal:  Moves all 4 extremities equally, grossly symmetrical strength  Integument: Warm & dry. No appreciable rash, erythema.  Neurologic:  Alert & oriented, speech clear and fluent, no focal deficits noted  Psych: Normal mood and affect      Labs and Imaging:  Results for orders placed or performed during the hospital encounter of 05/15/24   Basic metabolic panel   Result Value Ref Range    Sodium 138 135 - 145 mmol/L    Potassium 3.5 3.4 - 5.3 mmol/L    Chloride 105 98 - 107 mmol/L    Carbon Dioxide (CO2) 20 (L) 22 - 29 mmol/L    Anion Gap 13 7 - 15 mmol/L    Urea Nitrogen 13.7 6.0 - 20.0 mg/dL    Creatinine 0.64 0.51 - 0.95 mg/dL    GFR Estimate >90 >60 mL/min/1.73m2    Calcium 8.9 8.6 - 10.0 mg/dL    Glucose 94 70 - 99 mg/dL   Result Value Ref Range    Magnesium 2.1 1.7 - 2.3 mg/dL   Result Value Ref Range    Troponin T, High Sensitivity <6 <=14 ng/L   Hepatic function panel   Result Value Ref Range    Protein Total 6.6 6.4 - 8.3 g/dL    Albumin 4.3 3.5 - 5.2 g/dL    Bilirubin Total 0.2 <=1.2 mg/dL    Alkaline Phosphatase 49 40 - 150 U/L    AST 20 0 - 45 U/L    ALT 6 0 - 50 U/L    Bilirubin Direct <0.20 0.00 - 0.30 mg/dL   Result Value Ref Range    Lipase 36 13 - 60 U/L   UA with Microscopic reflex to Culture    Specimen: Urine, Clean Catch   Result Value Ref Range    Color Urine Light Yellow Colorless, Straw, Light Yellow, Yellow    Appearance Urine Turbid (A) Clear    Glucose Urine Negative Negative " mg/dL    Bilirubin Urine Negative Negative    Ketones Urine Trace (A) Negative mg/dL    Specific Gravity Urine 1.011 1.001 - 1.030    Blood Urine Negative Negative    pH Urine 7.0 5.0 - 7.0    Protein Albumin Urine Negative Negative mg/dL    Urobilinogen Urine <2.0 <2.0 mg/dL    Nitrite Urine Negative Negative    Leukocyte Esterase Urine Negative Negative    Bacteria Urine Few (A) None Seen /HPF    Mucus Urine Present (A) None Seen /LPF    RBC Urine 0 <=2 /HPF    WBC Urine <1 <=5 /HPF    Squamous Epithelials Urine 3 (H) <=1 /HPF   CBC with platelets and differential   Result Value Ref Range    WBC Count 6.5 4.0 - 11.0 10e3/uL    RBC Count 4.37 3.80 - 5.20 10e6/uL    Hemoglobin 13.3 11.7 - 15.7 g/dL    Hematocrit 39.4 35.0 - 47.0 %    MCV 90 78 - 100 fL    MCH 30.4 26.5 - 33.0 pg    MCHC 33.8 31.5 - 36.5 g/dL    RDW 13.2 10.0 - 15.0 %    Platelet Count 251 150 - 450 10e3/uL    % Neutrophils 54 %    % Lymphocytes 37 %    % Monocytes 7 %    % Eosinophils 1 %    % Basophils 1 %    % Immature Granulocytes 0 %    NRBCs per 100 WBC 0 <1 /100    Absolute Neutrophils 3.5 1.6 - 8.3 10e3/uL    Absolute Lymphocytes 2.4 0.8 - 5.3 10e3/uL    Absolute Monocytes 0.4 0.0 - 1.3 10e3/uL    Absolute Eosinophils 0.1 0.0 - 0.7 10e3/uL    Absolute Basophils 0.0 0.0 - 0.2 10e3/uL    Absolute Immature Granulocytes 0.0 <=0.4 10e3/uL    Absolute NRBCs 0.0 10e3/uL   Ammonia (on ice)   Result Value Ref Range    Ammonia 26 11 - 51 umol/L       I have reviewed the relevant laboratory studies above.    Any images independently reviewed are listed in the medical decision making    EKG: I reviewed and independently interpreted the patient's EKG, with comments/interpretation noted in the MDM    Procedures:  I was present for the key portions of procedures documented in JESSICA/midlevel note, see midlevel note for further details.    I, Lai Funez am serving as a scribe to document services personally performed by Dr. Mehdi Mendez based on my  observation and the provider's statements to me. I, Mehdi Mendez MD attest that Lai Funez is acting in a scribe capacity, has observed my performance of the services and has documented them in accordance with my direction.     Mehdi Mendez M.D.  [facility] EMERGENCY ROOM  [address] [id number]  [phone]       Mehdi Mendez MD  05/15/24 1651

## 2024-05-15 NOTE — ED PROVIDER NOTES
EMERGENCY DEPARTMENT ENCOUNTER      NAME: Shannon Christopher  AGE: 34 year old female  YOB: 1990  MRN: [id number]  EVALUATION DATE & TIME: No admission date for patient encounter.    PCP: Sveta Sandoval    ED PROVIDER: Marie Celaya PA-C      Chief Complaint   Patient presents with    Near Syncope         FINAL IMPRESSION:  1. Spell of altered consciousness          ED COURSE & MEDICAL DECISION MAKIN:04 PM Met with patient for initial interview. Plan for care discussed.  5:18 PM Staffed patient with Dr. Mendez.  6:00 PM Spoke with neurology who recommends outpatient follow-up with neurology for EEG. Will continue Depakote as previously instructed. Seizure precautions given to patient.  6:23 PM Reevaluated and updated patient. I discussed the plan for discharge with the patient, and patient is agreeable. We discussed supportive cares at home and reasons for return to the ER including new or worsening symptoms. All questions and concerns addressed. Patient to be discharged by RN.    34 year old female with a history of cerebral palsy, borderline personality disorder, anxiety, depression, ADHD with stimulant abuse (1 year clean), s/p hysterectomy who presents to this ED for evaluation of recurrent brief pre-syncopal/seizure-like episodes.  Upon exam, patient is afebrile, hemodynamically stable, and in no acute distress. Patient answers questions appropriately and exhibits no focal neurologic deficits. Differential diagnosis includes but not limited to absence seizure, cardiac arrhythmia, ACS, electrolyte abnormalities, anemia, dehydration, hepatic encephalopathy, hepatitis, gallbladder pathology, pancreatitis, UTI. Low suspicion for PE as no tachycardia, hypoxia, or recent provoking factors and PERC negative. Based on patient's presenting symptoms, laboratories and imaging were ordered. Patient was treated with IV NS upon arrival with moderate improvement in symptoms.    CBC without leukocytosis  or anemia. BMP with mild decrease in CO2, otherwise no other electrolyte abnormalities. HFP WNL. Lipase WNL. EKG NSR with right axis deviation, without acute ST elevations - no significant change compared to 2/10/23. Troponin <6. UA without evidence of infection. Ammonia WNL. Depakote level pending.    Spoke with neurology who recommends outpatient follow-up with neurology for EEG. Will continue Depakote as previously instructed. Seizure precautions given to patient. Symptoms and workup most consistent with absence seizures vs near-syncope/stress-response. Patient was made aware of the above findings. Patient passed PO challenge in ED and feels comfortable/safe with discharge home. Patient did not have recurrent episodes while in the ED. Plan to discharge patient home with seizure precautions and instruction to continue Depakote as previously instructed. Discussed strict return precautions and close follow up with their PCP and neurology for reevaluation and ongoing management including EEG - referral placed today. The patient was stable and well appearing upon discharge. The patient was advised to return to the ER if any new or worsening symptoms develop. The patient verbalizes understanding and agrees with the plan.     Medical Decision Making  Obtained supplemental history:Supplemental history obtained?: Documented in chart and Friend  Reviewed external records: External records reviewed?: Documented in chart  Care impacted by chronic illness:Mental Health, Smoking / Nicotine Use, and Other: cerebral palsy  Care significantly affected by social determinants of health:Housing Insecurity  Did you consider but not order tests?: Work up considered but not performed and documented in chart, if applicable  Did you interpret images independently?: Independent interpretation of ECG and images noted in documentation, when applicable.  Consultation discussion with other provider:Did you involve another provider  "(consultant, MH, pharmacy, etc.)?: I discussed the care with another health care provider, see documentation for details.  Discharge. I recommended the patient continue their current prescription strength medication(s): Depakote. See documentation for any additional details.    MEDICATIONS GIVEN IN THE EMERGENCY:  Medications   sodium chloride 0.9% BOLUS 1,000 mL (0 mLs Intravenous Stopped 5/15/24 1840)       NEW PRESCRIPTIONS STARTED AT TODAY'S ER VISIT  New Prescriptions    No medications on file          =================================================================    HPI    Patient information was obtained from: patient    Use of : N/A       Shannon Christopher is a 34 year old female with a pertinent history of cerebral palsy, borderline personality disorder, anxiety, depression, ADHD with stimulant abuse who presents to this ED for evaluation of syncopal episodes.  Patient is accompanied by her friend who reports patient has had intermittent loss of consciousness.  She reports episodes of \"blacked out.\"  When she comes to patient's friend notes that she is confused.  Patient reports occasional urinary and bowel incontinence with these episodes.  Her friend states that these episodes have been occurring intermittently over the last couple of days.  She reports about 3 episodes today.  She reports the last episode was at 4:34 PM this afternoon.  She currently lives at a group home and was told to come in for further evaluation.  She denies any history of seizures in the past.  She denies any new medications or changes in medications recently.  She reports good medication compliance.  She does admit to tobacco use and drinks alcohol less than once per week.  She denies a history of excessive alcohol use or alcohol withdrawal seizures in the past.  She does admit to a history of methamphetamine abuse, but has been sober from stimulants over the last 1 year.  She also complains of intermittent chest " "pain, shortness of breath, nausea, overall not feeling well.  However, she denies any prodromal symptoms prior to these episodes.  She denies any recent prolonged immobility, unilateral leg swelling or calf tenderness, oral estrogen use, recent surgeries or cancer history, or history of DVT/PE in the past.  She is not on any blood thinners.  She denies any recent trauma or falls.  However, she is accompanied by her friend who reports \"drops\", which after additional probing sounds to be these episodes where patient is unresponsive and eyes close.  Patient is status post hysterectomy and denies any abnormal bleeding.  She does note occasional white discharge once monthly.  She denies any concerns for sexually transmitted diseases.  She does note increased urination recently, no dysuria or hematuria.  She does admit to decreased appetite and not eating or drinking as often.  She states she is currently at a group home and working on getting her own apartment soon.  She has a history of cerebral palsy, but does not have a neurologist that she follows with.    REVIEW OF SYSTEMS   Review of Systems see HPI    PAST MEDICAL HISTORY:  Past Medical History:   Diagnosis Date    ADD (attention deficit disorder)     Anxiety     Borderline mental retardation     Borderline personality disorder (H)     Cerebral palsy (H)     Depression     PONV (postoperative nausea and vomiting)     Uterine fibroid        PAST SURGICAL HISTORY:  Past Surgical History:   Procedure Laterality Date    ADENOIDECTOMY      APPLY EXTERNAL FIXATOR LOWER EXTREMITY Left 5/18/2021    Procedure: CLOSED REDUCTION EXTERNAL FIXATION, LEFT ANKLE FRACTURE;  Surgeon: Dereck Nielson DO;  Location: [facility];  Service: Orthopedics    BUNIONECTOMY      EYE MUSCLE SURGERY      FOOT CAPSULE RELEASE W/ PERCUTANEOUS HEEL CORD LENGTHENING, TIBIAL TENDON TRANSFER Right     LAPAROSCOPIC HYSTERECTOMY N/A 1/4/2019    Procedure: ROBOTIC TOTAL LAPAROSCOPIC " HYSTERECTOMY, BILATERAL SALPINGECTOMY LYSIS OF ADHESIONS;  Surgeon: Jose Golden MD;  Location: [facility] OR;  Service: Gynecology    OPEN REDUCTION INTERNAL FIXATION ANKLE Left 5/28/2021    Procedure: WITH OPEN REDUCTION INTERNAL FIXATION, TRIMALLEOLAR, LEFT ANKLE;  Surgeon: Dereck Costa DO;  Location: [facility] OR;  Service: Orthopedics    REMOVE HARDWARE LOWER EXTREMITY Left 5/28/2021    Procedure: LEFT ANKLE EXTERNAL FIXATION REMOVAL;  Surgeon: Dereck Costa DO;  Location: [facility] OR;  Service: Orthopedics    TYMPANOPLASTY             CURRENT MEDICATIONS:    divalproex (DEPAKOTE ER) 500 MG 24 hour tablet  LATUDA 60 mg Tab tablet  metFORMIN (GLUCOPHAGE XR) 500 MG 24 hr tablet  OLANZapine (ZYPREXA) 2.5 MG tablet  OLANZapine (ZYPREXA) 7.5 MG tablet  zonisamide (ZONEGRAN) 100 MG capsule        ALLERGIES:  No Known Allergies    FAMILY HISTORY:  History reviewed. No pertinent family history.    SOCIAL HISTORY:   Social History     Socioeconomic History    Marital status: Single   Tobacco Use    Smoking status: Some Days    Smokeless tobacco: Never    Tobacco comments:     < 1 PPW   Substance and Sexual Activity    Alcohol use: Yes     Comment: Alcoholic Drinks/day: rarely    Drug use: No   Social History Narrative    Patient arrived alone to the ED 08/13/17       Social Determinants of Health     Financial Resource Strain: Medium Risk (4/23/2024)    [facility]    Financial Resource Strain     Difficulty of Paying Living Expenses: 2     Difficulty of Paying Living Expenses: 1   Food Insecurity: Food Insecurity Present (4/23/2024)    Received from [facility]    Food Insecurity     Worried About Running Out of Food in the Last Year: 2   Transportation Needs: Unmet Transportation Needs (4/23/2024)    [facility]    Transportation Needs     Lack of  "Transportation (Medical): 2   Social Connections: Socially Isolated (4/23/2024)    Received from [facility]    Social Connections     Frequency of Communication with Friends and Family: 4   Housing Stability: Medium Risk (4/23/2024)    Received from [facility]    Housing Stability     Unable to Pay for Housing in the Last Year: 2       VITALS:  /89   Pulse 79   Temp 97.3  F (36.3  C) (Temporal)   Resp 18   Ht 1.575 m (5' 2\")   Wt 52.6 kg (116 lb)   SpO2 100%   BMI 21.22 kg/m      PHYSICAL EXAM    Constitutional:  Alert, in no acute distress. Cooperative.  EYES: Conjunctivae clear. PERRL. EOM intact. Peripheral fields intact.  HENT:  Atraumatic, normocephalic. Oropharynx clear. Moist membranes. Tongue without deviation.  Respiratory:  Respirations even, unlabored, in no acute respiratory distress. Lungs clear to auscultation bilaterally without wheeze, rhonchi, or rales. No cough. Speaks in full sentences easily.  Cardiovascular:  Regular rate and rhythm, good peripheral perfusion. No peripheral edema. No chest wall tenderness.  GI: Soft, flat, non-distended. Bowel sounds normal. Mild upper abdominal tenderness to palpation. No guarding, rebound, or other peritoneal signs.  Musculoskeletal:  No edema. No cyanosis. Range of motion major extremities intact.    Integument: Warm, Dry. No rash to visualized skin.   Neurologic:  Alert & oriented x4. No focal deficits noted. GCS 15. Sensation intact. Motor intact. Coordination intact. 5/5 strength.   Psych: Normal mood and affect. Intermittently tearful secondary to feeling scared about possible seizures.     LAB:  All pertinent labs reviewed and interpreted.  Results for orders placed or performed during the hospital encounter of 05/15/24   Basic metabolic panel   Result Value Ref Range    Sodium 138 135 - 145 mmol/L    Potassium 3.5 3.4 - 5.3 mmol/L    Chloride 105 98 - 107 mmol/L    Carbon " Dioxide (CO2) 20 (L) 22 - 29 mmol/L    Anion Gap 13 7 - 15 mmol/L    Urea Nitrogen 13.7 6.0 - 20.0 mg/dL    Creatinine 0.64 0.51 - 0.95 mg/dL    GFR Estimate >90 >60 mL/min/1.73m2    Calcium 8.9 8.6 - 10.0 mg/dL    Glucose 94 70 - 99 mg/dL   Result Value Ref Range    Magnesium 2.1 1.7 - 2.3 mg/dL   Result Value Ref Range    Troponin T, High Sensitivity <6 <=14 ng/L   Hepatic function panel   Result Value Ref Range    Protein Total 6.6 6.4 - 8.3 g/dL    Albumin 4.3 3.5 - 5.2 g/dL    Bilirubin Total 0.2 <=1.2 mg/dL    Alkaline Phosphatase 49 40 - 150 U/L    AST 20 0 - 45 U/L    ALT 6 0 - 50 U/L    Bilirubin Direct <0.20 0.00 - 0.30 mg/dL   Result Value Ref Range    Lipase 36 13 - 60 U/L   UA with Microscopic reflex to Culture    Specimen: Urine, Clean Catch   Result Value Ref Range    Color Urine Light Yellow Colorless, Straw, Light Yellow, Yellow    Appearance Urine Turbid (A) Clear    Glucose Urine Negative Negative mg/dL    Bilirubin Urine Negative Negative    Ketones Urine Trace (A) Negative mg/dL    Specific Gravity Urine 1.011 1.001 - 1.030    Blood Urine Negative Negative    pH Urine 7.0 5.0 - 7.0    Protein Albumin Urine Negative Negative mg/dL    Urobilinogen Urine <2.0 <2.0 mg/dL    Nitrite Urine Negative Negative    Leukocyte Esterase Urine Negative Negative    Bacteria Urine Few (A) None Seen /HPF    Mucus Urine Present (A) None Seen /LPF    RBC Urine 0 <=2 /HPF    WBC Urine <1 <=5 /HPF    Squamous Epithelials Urine 3 (H) <=1 /HPF   CBC with platelets and differential   Result Value Ref Range    WBC Count 6.5 4.0 - 11.0 10e3/uL    RBC Count 4.37 3.80 - 5.20 10e6/uL    Hemoglobin 13.3 11.7 - 15.7 g/dL    Hematocrit 39.4 35.0 - 47.0 %    MCV 90 78 - 100 fL    MCH 30.4 26.5 - 33.0 pg    MCHC 33.8 31.5 - 36.5 g/dL    RDW 13.2 10.0 - 15.0 %    Platelet Count 251 150 - 450 10e3/uL    % Neutrophils 54 %    % Lymphocytes 37 %    % Monocytes 7 %    % Eosinophils 1 %    % Basophils 1 %    % Immature Granulocytes 0 %     NRBCs per 100 WBC 0 <1 /100    Absolute Neutrophils 3.5 1.6 - 8.3 10e3/uL    Absolute Lymphocytes 2.4 0.8 - 5.3 10e3/uL    Absolute Monocytes 0.4 0.0 - 1.3 10e3/uL    Absolute Eosinophils 0.1 0.0 - 0.7 10e3/uL    Absolute Basophils 0.0 0.0 - 0.2 10e3/uL    Absolute Immature Granulocytes 0.0 <=0.4 10e3/uL    Absolute NRBCs 0.0 10e3/uL   Ammonia (on ice)   Result Value Ref Range    Ammonia 26 11 - 51 umol/L       RADIOLOGY:  Reviewed all pertinent imaging. Please see official radiology report.  No orders to display       EKG:    Dr. Mendez and I have independently reviewed and interpreted the EKG(s) documented above.    Marie Celaya PA-C  [facility] EMERGENCY ROOM  [address] [id number] [phone]      Marie Celaya PA-C  05/15/24 4450

## 2024-05-15 NOTE — MEDICATION SCRIBE - ADMISSION MEDICATION HISTORY
Medication Scribe Admission Medication History    Admission medication history is complete. The information provided in this note is only as accurate as the sources available at the time of the update.    Information Source(s): Patient and CareEverywhere/SureScripts via in-person    Pertinent Information: Patient reports no other OTC products or supplements at this time.     Removed medications that were over a year old (see below).    Changes made to PTA medication list:  Added:   Metformin  mg  Deleted:   Acetaminophen 500 mg (scheduled)  Aspirin 81 mg  Cyclobenzaprine 10 mg -- last fill 5/2023  Estradiol 2 mg   Hydromorphone 2 mg  Hydroxyzine 25 mg  Ketorolac 10 mg  Nystatin cream  Olanzapine 7.5 mg (duplicate)  Ondansetron 4 mg ODT  Polyethylene glycol 17 g   Senna-docusate 8.6-50 mg  Changed:   Divalproex 500 mg ER: 1 tab at bedtime --> 2 tab at bedtime     Allergies reviewed with patient and updates made in EHR: yes    Medication History Completed By: Gordy Quintanilla 5/15/2024 5:35 PM    PTA Med List   Medication Sig Last Dose    divalproex (DEPAKOTE ER) 500 MG 24 hour tablet Take 1,000 mg by mouth at bedtime 5/14/2024 at HS    LATUDA 60 mg Tab tablet [LATUDA 60 MG TAB TABLET] Take 60 mg by mouth at bedtime.  5/14/2024 at HS    metFORMIN (GLUCOPHAGE XR) 500 MG 24 hr tablet Take 500 mg by mouth 2 times daily (with meals) 5/15/2024 at AM; 1 of 2    OLANZapine (ZYPREXA) 2.5 MG tablet Take 2.5 mg by mouth daily as needed (Take one tablet as needed for agitation/anxiety.) (Also taking 7.5 mg at bedtime.) Past Week at few days ago    OLANZapine (ZYPREXA) 7.5 MG tablet Take 1 tablet (7.5 mg) by mouth at bedtime 5/14/2024 at HS    zonisamide (ZONEGRAN) 100 MG capsule [ZONISAMIDE (ZONEGRAN) 100 MG CAPSULE] Take 400 mg by mouth at bedtime.  5/14/2024 at HS

## 2024-05-16 ENCOUNTER — TELEPHONE (OUTPATIENT)
Dept: NURSING | Facility: CLINIC | Age: 34
End: 2024-05-16
Payer: MEDICARE

## 2024-05-16 NOTE — TELEPHONE ENCOUNTER
[facility]     Reason for call: Lab Result Notification     Lab Result (including Rx patient on, if applicable).  If culture, copy of lab report at bottom.  Lab Result:   Component      Latest Ref Rng 5/15/2024  6:18 PM   Valproic acid        ug/mL 37.6 (L)       Legend:  (L) Low    Creatinine Level (mg/dl)   Creatinine   Date Value Ref Range Status   05/15/2024 0.64 0.51 - 0.95 mg/dL Final    Creatinine clearance (ml/min), if applicable    Serum creatinine: 0.64 mg/dL 05/15/24 1722  Estimated creatinine clearance: 100.9 mL/min     Patient's current Symptoms:   Unable to reach patient. Left message.     05/16/2024 1226: Patient returning call. Advised patient of result and stated she can pass this on to her PCP. Patient verbalized understanding.     RN Recommendations/Instructions per [facility] ED lab result protocol:   [facility] ED lab result protocol utilized: Kim RODRIGUEZ RN

## 2024-05-16 NOTE — LETTER
May 16, 2024        Shannon Christopher  [facility] [address] [address]          Dear Shannon Christopher:    You were seen in the [facility] Emergency Department at [facility] on 5/15/2024.  We are unable to reach you by phone, so we are sending you this letter.     It is important that you call [facility] Emergency Department lab result nurse at [phone], as we have information to relay to you AND/OR we MAY have to make some changes in your treatment.    Best time to call back is between 9AM and 5:30PM, 7 days a week.      Sincerely,     [facility] Emergency Department Lab Result RN  [phone]

## 2024-05-17 NOTE — TELEPHONE ENCOUNTER
Patient calling back, advised her Valproic acid was low. Patient wrote the information down. Stated she would let her PCP know.     YULIANA RODRIGUEZ RN

## 2024-05-17 NOTE — TELEPHONE ENCOUNTER
"Patient calling back again. States she can't remember our conversation yesterday and wants to know what lab was low. Asked patient to get a pen to write the information down. Patient stated she does not have one. Advised her valproic acid is low and pass it on the to her PCP. Patient states \"I can't because I won't remember\". Advised her to call back when she has a pen and paper. Patient hung up.     YULIANA RODRIGUEZ RN    "

## 2024-05-19 ENCOUNTER — HOSPITAL ENCOUNTER (EMERGENCY)
Facility: CLINIC | Age: 34
Discharge: HOME | End: 2024-05-19
Attending: STUDENT IN AN ORGANIZED HEALTH CARE EDUCATION/TRAINING PROGRAM | Admitting: STUDENT IN AN ORGANIZED HEALTH CARE EDUCATION/TRAINING PROGRAM
Payer: MEDICARE

## 2024-05-19 VITALS
HEART RATE: 79 BPM | HEIGHT: 62 IN | TEMPERATURE: 99 F | BODY MASS INDEX: 21.35 KG/M2 | SYSTOLIC BLOOD PRESSURE: 116 MMHG | OXYGEN SATURATION: 100 % | WEIGHT: 116 LBS | RESPIRATION RATE: 16 BRPM | DIASTOLIC BLOOD PRESSURE: 88 MMHG

## 2024-05-19 DIAGNOSIS — F41.9 ANXIETY: ICD-10-CM

## 2024-05-19 PROCEDURE — 99283 EMERGENCY DEPT VISIT LOW MDM: CPT

## 2024-05-19 PROCEDURE — 250N000013 HC RX MED GY IP 250 OP 250 PS 637: Performed by: STUDENT IN AN ORGANIZED HEALTH CARE EDUCATION/TRAINING PROGRAM

## 2024-05-19 RX ORDER — HYDROXYZINE HYDROCHLORIDE 25 MG/1
25 TABLET, FILM COATED ORAL ONCE
Status: COMPLETED | OUTPATIENT
Start: 2024-05-19 | End: 2024-05-19

## 2024-05-19 RX ORDER — HYDROXYZINE HYDROCHLORIDE 25 MG/1
25 TABLET, FILM COATED ORAL 3 TIMES DAILY PRN
Qty: 30 TABLET | Refills: 0 | Status: SHIPPED | OUTPATIENT
Start: 2024-05-19 | End: 2024-06-02

## 2024-05-19 RX ADMIN — HYDROXYZINE HYDROCHLORIDE 25 MG: 25 TABLET ORAL at 16:08

## 2024-05-19 ASSESSMENT — COLUMBIA-SUICIDE SEVERITY RATING SCALE - C-SSRS
6. HAVE YOU EVER DONE ANYTHING, STARTED TO DO ANYTHING, OR PREPARED TO DO ANYTHING TO END YOUR LIFE?: YES
1. IN THE PAST MONTH, HAVE YOU WISHED YOU WERE DEAD OR WISHED YOU COULD GO TO SLEEP AND NOT WAKE UP?: NO
2. HAVE YOU ACTUALLY HAD ANY THOUGHTS OF KILLING YOURSELF IN THE PAST MONTH?: NO

## 2024-05-19 ASSESSMENT — ACTIVITIES OF DAILY LIVING (ADL): ADLS_ACUITY_SCORE: 33

## 2024-05-19 NOTE — ED TRIAGE NOTES
Patient presents to ED with c/o low Depakote levels and her primary cannot see her until June, feeling anxious, angry, sleepy, having memory issues.  Zoey Herr RN.......5/19/2024 3:34 PM

## 2024-05-19 NOTE — ED PROVIDER NOTES
EMERGENCY DEPARTMENT ENCOUNTER      NAME: Shannon Christopher  AGE: 34 year old female  YOB: 1990  MRN: [id number]  EVALUATION DATE & TIME: 5/19/2024  3:34 PM    PCP: Sveta Sandoval    ED PROVIDER: Clint Gramajo MD      Chief Complaint   Patient presents with    Mental Health Problem         FINAL IMPRESSION:  1. Anxiety          ED COURSE & MEDICAL DECISION MAKING:    Pertinent Labs & Imaging studies reviewed. (See chart for details)  34 year old female presents to the Emergency Department for evaluation of anxiety and medication dose adjustment.     ED Course as of 05/19/24 1623   Sun May 19, 2024   1557 Patient is a 34-year-old female with history of bipolar disorder presented to the emergency department for concerns of subtherapeutic Depakote dosing.  Patient was seen in the emergency department a few days ago after spells of altered consciousness.  During that workup she had a Depakote level sign off which was a little bit subtherapeutic at 37.6.  She says she has been having increased feelings of anxiety although denies any suicidal homicidal ideation.  States she contacted her psychiatrist to try to get an appointment but he is currently scheduled for the first week of June and cannot get this moved up.  Says they were not willing to adjust her Depakote dosing over the phone.  Otherwise denies any specific additional medical complaints.  No chest pain or shortness of breath.  No recent fevers.    On exam here patient is a little bit anxious but in no acute distress.  Hemodynamically stable and afebrile.  Regular rate and rhythm.  Lungs are clear without any wheezes or rails.  She denies any suicidal or homicidal ideation.  Answers questions appropriately.  Denies any delusions.       1558 I discussed with the patient at length that the Depakote level is random drawn it is hard to titrate medications based on this.  Additionally I am not the prescriber of her Depakote and I do not feel comfortable  adjusting her dosing particularly if she is only little bit subtherapeutic.  I discussed with patient that she should reach back out to her doctor's office and see if they are able to temporarily increase her dosing until she is able to get in with them in a few weeks for her appointment.  Otherwise if they are not willing to do this over the phone there may be a good reason for it and she will need to see them in clinic to determine if additional dose adjustments required or perhaps a second agent for management of bipolar is required.  Patient does have feelings of anxiety although denies any suicidal or homicidal ideation and do not believe she is an imminent risk to herself or others.  Patient is requesting something to help with anxious spells at home and think it is reasonable to try a short course of hydroxyzine for as needed anxiety.  Otherwise patient safe for discharge home at this point in time.     1550 I met and evaluated the patient.       Medical Decision Making  Obtained supplemental history:Supplemental history obtained?: Documented in chart and Family Member/Significant Other  Reviewed external records: External records reviewed?: Documented in chart and Outpatient Record: Previous emergency department visit  Care impacted by chronic illness:Mental Health  Care significantly affected by social determinants of health:N/A  Did you consider but not order tests?: Work up considered but not performed and documented in chart, if applicable  Did you interpret images independently?: Independent interpretation of ECG and images noted in documentation, when applicable.  Consultation discussion with other provider:Did you involve another provider (consultant, , pharmacy, etc.)?: No  Discharge. No recommendations on prescription strength medication(s). See documentation for any additional details.          At the conclusion of the encounter I discussed the results of all of the tests and the disposition. The  questions were answered. The patient or family acknowledged understanding and was agreeable with the care plan.     0 minutes of critical care time     MEDICATIONS GIVEN IN THE EMERGENCY:  Medications   hydrOXYzine HCl (ATARAX) tablet 25 mg (25 mg Oral $Given 5/19/24 4247)       NEW PRESCRIPTIONS STARTED AT TODAY'S ER VISIT  New Prescriptions    HYDROXYZINE HCL (ATARAX) 25 MG TABLET    Take 1 tablet (25 mg) by mouth 3 times daily as needed for itching          =================================================================    [facility]    Patient information was obtained from: patient    Patient is a 34-year-old female with history of bipolar disorder presented to the emergency department for concerns of subtherapeutic Depakote dosing.  Patient was seen in the emergency department a few days ago after spells of altered consciousness.  During that workup she had a Depakote level sign off which was a little bit subtherapeutic at 37.6.  She says she has been having increased feelings of anxiety although denies any suicidal homicidal ideation.  States she contacted her psychiatrist to try to get an appointment but he is currently scheduled for the first week of June and cannot get this moved up.  Says they were not willing to adjust her Depakote dosing over the phone.    REVIEW OF SYSTEMS   Refer to the [facility]    PAST MEDICAL HISTORY:  Past Medical History:   Diagnosis Date    ADD (attention deficit disorder)     Anxiety     Borderline mental retardation     Borderline personality disorder (H)     Cerebral palsy (H)     Depression     PONV (postoperative nausea and vomiting)     Uterine fibroid        PAST SURGICAL HISTORY:  Past Surgical History:   Procedure Laterality Date    ADENOIDECTOMY      APPLY EXTERNAL FIXATOR LOWER EXTREMITY Left 5/18/2021    Procedure: CLOSED REDUCTION EXTERNAL FIXATION, LEFT ANKLE FRACTURE;  Surgeon: Dereck Nielson DO;  Location: [facility];  Service: Orthopedics    BUNIONECTOMY       EYE MUSCLE SURGERY      FOOT CAPSULE RELEASE W/ PERCUTANEOUS HEEL CORD LENGTHENING, TIBIAL TENDON TRANSFER Right     LAPAROSCOPIC HYSTERECTOMY N/A 1/4/2019    Procedure: ROBOTIC TOTAL LAPAROSCOPIC HYSTERECTOMY, BILATERAL SALPINGECTOMY LYSIS OF ADHESIONS;  Surgeon: Jose Golden MD;  Location: [facility] OR;  Service: Gynecology    OPEN REDUCTION INTERNAL FIXATION ANKLE Left 5/28/2021    Procedure: WITH OPEN REDUCTION INTERNAL FIXATION, TRIMALLEOLAR, LEFT ANKLE;  Surgeon: Dereck Costa DO;  Location: [facility] OR;  Service: Orthopedics    REMOVE HARDWARE LOWER EXTREMITY Left 5/28/2021    Procedure: LEFT ANKLE EXTERNAL FIXATION REMOVAL;  Surgeon: Dereck Costa DO;  Location: [facility];  Service: Orthopedics    TYMPANOPLASTY             CURRENT MEDICATIONS:    hydrOXYzine HCl (ATARAX) 25 MG tablet  divalproex (DEPAKOTE ER) 500 MG 24 hour tablet  LATUDA 60 mg Tab tablet  metFORMIN (GLUCOPHAGE XR) 500 MG 24 hr tablet  OLANZapine (ZYPREXA) 2.5 MG tablet  OLANZapine (ZYPREXA) 7.5 MG tablet  zonisamide (ZONEGRAN) 100 MG capsule        ALLERGIES:  No Known Allergies    FAMILY HISTORY:  No family history on file.    SOCIAL HISTORY:   Social History     Socioeconomic History    Marital status: Single   Tobacco Use    Smoking status: Some Days    Smokeless tobacco: Never    Tobacco comments:     < 1 PPW   Substance and Sexual Activity    Alcohol use: Yes     Comment: Alcoholic Drinks/day: rarely    Drug use: No   Social History Narrative    Patient arrived alone to the ED 08/13/17       Social Determinants of Health     Financial Resource Strain: Medium Risk (4/23/2024)    Received from [employer]    Financial Resource Strain     Difficulty of Paying Living Expenses: 2     Difficulty of Paying Living Expenses: 1   Food Insecurity: Food Insecurity Present (4/23/2024)    [facility]    Food Insecurity      "Worried About Running Out of Food in the Last Year: 2   Transportation Needs: Unmet Transportation Needs (4/23/2024)    Received from [facility] [employer] [facility]    Transportation Needs     Lack of Transportation (Medical): 2   Social Connections: Socially Isolated (4/23/2024)    Received from [facility]    Social Connections     Frequency of Communication with Friends and Family: 4   Housing Stability: Medium Risk (4/23/2024)    Received from [facility]    Housing Stability     Unable to Pay for Housing in the Last Year: 2       VITALS:  /75   Pulse 79   Temp 99  F (37.2  C) (Temporal)   Resp 16   Ht 1.575 m (5' 2\")   Wt 52.6 kg (116 lb)   SpO2 100%   BMI 21.22 kg/m      PHYSICAL EXAM    Constitutional: Well developed, Well nourished, NAD,  HENT: Normocephalic, Atraumatic,  mucous membranes moist,   Neck- trachea midline, No stridor.    Eyes:EOMI, Conjunctiva normal, No discharge.   Respiratory: Normal breath sounds, No respiratory distress, No wheezing.   Cardiovascular: Normal heart rate, Regular rhythm,  No murmurs,   Abdominal: Soft, No tenderness, No rebound or guarding.     Musculoskeletal: no deformity or malalignment   Integument: Warm, Dry, No erythema,    Neurologic: Alert & oriented x 3   Psychiatric: Anxious, answers questions appropriately, some circumferential speech, denies suicidal or homicidal ideation     LAB:  All pertinent labs reviewed and interpreted.       RADIOLOGY:  Reviewed all pertinent imaging. Please see official radiology report.  No orders to display       EKG:        PROCEDURES:         [employer] Documentation:   CMS Diagnoses:              Clint Gramajo MD  [facility] EMERGENCY ROOM  [address] [id number] [phone]      Clint Gramajo MD  05/19/24 1623    "

## 2024-05-19 NOTE — DISCHARGE INSTRUCTIONS
Your Depakote level was a little bit low a few days ago on the emergency department however this was a random lab draw is difficult to determine what kind of adjustments are needed for her valproic acid based only on this.  Additionally this medication is currently being prescribed by your psychiatrist and you should follow-up with them to see if they recommend dose adjustments prior to being evaluated at your appointment in a few weeks.  Otherwise you been given a medication called hydroxyzine to help with anxiety and may take this as needed up to 4 times a day.

## 2024-07-09 ENCOUNTER — LAB REQUISITION (OUTPATIENT)
Dept: LAB | Facility: CLINIC | Age: 34
End: 2024-07-09

## 2024-07-09 DIAGNOSIS — Z11.3 ENCOUNTER FOR SCREENING FOR INFECTIONS WITH A PREDOMINANTLY SEXUAL MODE OF TRANSMISSION: ICD-10-CM

## 2024-07-09 PROCEDURE — 87491 CHLMYD TRACH DNA AMP PROBE: CPT | Performed by: NURSE PRACTITIONER

## 2024-07-10 LAB
C TRACH DNA SPEC QL PROBE+SIG AMP: NEGATIVE
N GONORRHOEA DNA SPEC QL NAA+PROBE: NEGATIVE

## 2024-11-13 ENCOUNTER — LAB REQUISITION (OUTPATIENT)
Dept: LAB | Facility: CLINIC | Age: 34
End: 2024-11-13
Payer: MEDICARE

## 2024-11-13 DIAGNOSIS — Z11.59 ENCOUNTER FOR SCREENING FOR OTHER VIRAL DISEASES: ICD-10-CM

## 2024-11-13 DIAGNOSIS — Z11.8 ENCOUNTER FOR SCREENING FOR OTHER INFECTIOUS AND PARASITIC DISEASES: ICD-10-CM

## 2024-11-13 DIAGNOSIS — Z11.4 ENCOUNTER FOR SCREENING FOR HUMAN IMMUNODEFICIENCY VIRUS (HIV): ICD-10-CM

## 2024-11-13 LAB — HIV 1+2 AB+HIV1 P24 AG SERPL QL IA: NONREACTIVE

## 2024-11-13 PROCEDURE — 86780 TREPONEMA PALLIDUM: CPT | Mod: ORL | Performed by: OBSTETRICS & GYNECOLOGY

## 2024-11-13 PROCEDURE — 86803 HEPATITIS C AB TEST: CPT | Mod: ORL | Performed by: OBSTETRICS & GYNECOLOGY

## 2024-11-13 PROCEDURE — 87340 HEPATITIS B SURFACE AG IA: CPT | Mod: ORL | Performed by: OBSTETRICS & GYNECOLOGY

## 2024-11-13 PROCEDURE — 87491 CHLMYD TRACH DNA AMP PROBE: CPT | Mod: ORL | Performed by: OBSTETRICS & GYNECOLOGY

## 2024-11-13 PROCEDURE — 87389 HIV-1 AG W/HIV-1&-2 AB AG IA: CPT | Mod: ORL | Performed by: OBSTETRICS & GYNECOLOGY

## 2024-11-14 LAB
C TRACH DNA SPEC QL PROBE+SIG AMP: NEGATIVE
HBV SURFACE AG SERPL QL IA: NONREACTIVE
HCV AB SERPL QL IA: NONREACTIVE
N GONORRHOEA DNA SPEC QL NAA+PROBE: NEGATIVE
T PALLIDUM AB SER QL: NONREACTIVE

## 2024-11-22 ENCOUNTER — HOSPITAL ENCOUNTER (EMERGENCY)
Facility: CLINIC | Age: 34
Discharge: HOME | End: 2024-11-22
Attending: EMERGENCY MEDICINE | Admitting: EMERGENCY MEDICINE
Payer: MEDICARE

## 2024-11-22 ENCOUNTER — APPOINTMENT (OUTPATIENT)
Dept: ULTRASOUND IMAGING | Facility: CLINIC | Age: 34
End: 2024-11-22
Payer: MEDICARE

## 2024-11-22 VITALS
WEIGHT: 122.8 LBS | HEIGHT: 62 IN | BODY MASS INDEX: 22.6 KG/M2 | DIASTOLIC BLOOD PRESSURE: 74 MMHG | OXYGEN SATURATION: 99 % | HEART RATE: 67 BPM | TEMPERATURE: 97.1 F | SYSTOLIC BLOOD PRESSURE: 111 MMHG | RESPIRATION RATE: 18 BRPM

## 2024-11-22 DIAGNOSIS — G89.29 CHRONIC PELVIC PAIN IN FEMALE: ICD-10-CM

## 2024-11-22 DIAGNOSIS — R10.2 CHRONIC PELVIC PAIN IN FEMALE: ICD-10-CM

## 2024-11-22 LAB
ALBUMIN UR-MCNC: NEGATIVE MG/DL
APPEARANCE UR: CLEAR
BACTERIA #/AREA URNS HPF: ABNORMAL /HPF
BILIRUB UR QL STRIP: NEGATIVE
CLUE CELLS: ABNORMAL
COLOR UR AUTO: COLORLESS
GLUCOSE UR STRIP-MCNC: NEGATIVE MG/DL
HCG UR QL: NEGATIVE
HGB UR QL STRIP: NEGATIVE
KETONES UR STRIP-MCNC: 10 MG/DL
LEUKOCYTE ESTERASE UR QL STRIP: NEGATIVE
MUCOUS THREADS #/AREA URNS LPF: PRESENT /LPF
NITRATE UR QL: NEGATIVE
PH UR STRIP: 8 [PH] (ref 5–7)
RBC URINE: 0 /HPF
SP GR UR STRIP: 1.01 (ref 1–1.03)
SQUAMOUS EPITHELIAL: <1 /HPF
TRICHOMONAS, WET PREP: ABNORMAL
UROBILINOGEN UR STRIP-MCNC: <2 MG/DL
WBC URINE: <1 /HPF
WBC'S/HIGH POWER FIELD, WET PREP: ABNORMAL
YEAST, WET PREP: ABNORMAL

## 2024-11-22 PROCEDURE — 250N000011 HC RX IP 250 OP 636

## 2024-11-22 PROCEDURE — 87210 SMEAR WET MOUNT SALINE/INK: CPT

## 2024-11-22 PROCEDURE — 87491 CHLMYD TRACH DNA AMP PROBE: CPT

## 2024-11-22 PROCEDURE — 96372 THER/PROPH/DIAG INJ SC/IM: CPT

## 2024-11-22 PROCEDURE — 93976 VASCULAR STUDY: CPT

## 2024-11-22 PROCEDURE — 81025 URINE PREGNANCY TEST: CPT

## 2024-11-22 PROCEDURE — 76830 TRANSVAGINAL US NON-OB: CPT

## 2024-11-22 PROCEDURE — 99285 EMERGENCY DEPT VISIT HI MDM: CPT | Mod: 25

## 2024-11-22 PROCEDURE — 81003 URINALYSIS AUTO W/O SCOPE: CPT

## 2024-11-22 RX ORDER — KETOROLAC TROMETHAMINE 30 MG/ML
30 INJECTION, SOLUTION INTRAMUSCULAR; INTRAVENOUS ONCE
Status: COMPLETED | OUTPATIENT
Start: 2024-11-22 | End: 2024-11-22

## 2024-11-22 RX ADMIN — KETOROLAC TROMETHAMINE 30 MG: 30 INJECTION, SOLUTION INTRAMUSCULAR at 17:28

## 2024-11-22 ASSESSMENT — ACTIVITIES OF DAILY LIVING (ADL)
ADLS_ACUITY_SCORE: 0

## 2024-11-22 ASSESSMENT — COLUMBIA-SUICIDE SEVERITY RATING SCALE - C-SSRS
6. HAVE YOU EVER DONE ANYTHING, STARTED TO DO ANYTHING, OR PREPARED TO DO ANYTHING TO END YOUR LIFE?: NO
5. HAVE YOU STARTED TO WORK OUT OR WORKED OUT THE DETAILS OF HOW TO KILL YOURSELF? DO YOU INTEND TO CARRY OUT THIS PLAN?: NO
4. HAVE YOU HAD THESE THOUGHTS AND HAD SOME INTENTION OF ACTING ON THEM?: NO
3. HAVE YOU BEEN THINKING ABOUT HOW YOU MIGHT KILL YOURSELF?: NO
2. HAVE YOU ACTUALLY HAD ANY THOUGHTS OF KILLING YOURSELF IN THE PAST MONTH?: YES
1. IN THE PAST MONTH, HAVE YOU WISHED YOU WERE DEAD OR WISHED YOU COULD GO TO SLEEP AND NOT WAKE UP?: YES

## 2024-11-22 NOTE — ED PROVIDER NOTES
"Emergency Department Midlevel Supervisory Note     I had a face to face encounter with this patient seen by the Advanced Practice Provider (JESSICA). I personally made/approved the management plan and take responsibility for the patient management. I personally saw patient and performed a substantive portion of the visit including all aspects of the medical decision making.     ED Course:  4:52 PM Kat Javier PA-C staffed patient with me. I agree with their assessment and plan of management, and I will see the patient.  5:05 PM I met with the patient to introduce myself, gather additional history, perform my initial exam, and discuss the plan.     Brief HPI:     Shannon Christopher is a 34 year old female who presents for evaluation of abdominal pain.    The patient reports bilateral lower abdominal pain following her hysterectomy years ago. The abdominal pain occurs approximately on the 15-17, 20-23, and the 30 of each month. No change in intensity of the pain. Today, the abdominal pain began a few hours ago and patient presents to the ED due to the pain being unbearable and wanting to \"nip it in the butt.\" Patient has taken Tylenol and Midol but denies any improvement in pain. She also endorses leaking white substance from the hysterectomy site. Patient reports nausea, changes in mood, unintended weight gain, unusual food cravings, increased frequency of urination, hematuria, fever, and chills. She also notes vomiting with previous abdominal pain but denies any today. Denies any dysuria.     I, Kathleen Young, am serving as a scribe to document services personally performed by Dr. Gale, based on my observations and the provider's statements to me.   I, Chai Gale MD, attest that Kathleen Young was acting in a scribe capacity, has observed my performance of the services and has documented them in accordance with my direction.    Brief Physical Exam: /74   Pulse 67   Temp 97.1  F (36.2  C) " "(Temporal)   Resp 18   Ht 1.562 m (5' 1.5\")   Wt 55.7 kg (122 lb 12.8 oz)   SpO2 99%   BMI 22.83 kg/m    Constitutional:  Alert, in no acute distress  EYES: Conjunctivae clear  HENT:  Atraumatic  Respiratory:  Respirations even, unlabored, in no acute respiratory distress  Cardiovascular:  Regular rate and rhythm, good peripheral perfusion  GI: Soft, non-distended, there is mild tenderness to palpation in the lateral left lower abdomen and lateral right lower abdomen but no guarding or masses.  Musculoskeletal:  Moves all 4 extremities equally, grossly symmetrical strength  Integument: Warm & dry. No appreciable rash, erythema.  Neurologic:  Alert & oriented, speech clear and fluent, no focal deficits noted  Psych: Normal mood and affect       MDM:  This patient is a 34-year-old female who has had an hysterectomy and presents with abdominal pain.  She has been getting this monthly since the hysterectomy.  She has not had any other worrisome symptoms other than a white vaginal discharge recently.  Her exam was benign.  I discussed with her that she should be talking to her OB/GYN about this and we also spoke about endometriosis.  A pelvic ultrasound was done which did not show any evidence of ovarian torsion.  Her lab work also was relatively unremarkable.  She has been discharged to follow-up with her OB/GYN as an outpatient to discuss her chronic pelvic pain.       1. Chronic pelvic pain in female        Labs and Imaging:  Results for orders placed or performed during the hospital encounter of 11/22/24   US Pelvis Cmplt w Transvag & Doppler LmtPel Duplex Limited    Impression    IMPRESSION:    Normal pelvic ultrasound status post hysterectomy.         UA with Microscopic reflex to Culture    Specimen: Urine, Clean Catch   Result Value Ref Range    Color Urine Colorless Colorless, Straw, Light Yellow, Yellow    Appearance Urine Clear Clear    Glucose Urine Negative Negative mg/dL    Bilirubin Urine Negative " Negative    Ketones Urine 10 (A) Negative mg/dL    Specific Gravity Urine 1.007 1.001 - 1.030    Blood Urine Negative Negative    pH Urine 8.0 (H) 5.0 - 7.0    Protein Albumin Urine Negative Negative mg/dL    Urobilinogen Urine <2.0 <2.0 mg/dL    Nitrite Urine Negative Negative    Leukocyte Esterase Urine Negative Negative    Bacteria Urine Few (A) None Seen /HPF    Mucus Urine Present (A) None Seen /LPF    RBC Urine 0 <=2 /HPF    WBC Urine <1 <=5 /HPF    Squamous Epithelials Urine <1 <=1 /HPF   HCG qualitative urine   Result Value Ref Range    hCG Urine Qualitative Negative Negative   Wet prep    Specimen: Vagina; Swab   Result Value Ref Range    Trichomonas Absent Absent    Yeast Absent Absent    Clue Cells Absent Absent    WBCs/high power field 1+ (A) None       I have reviewed the relevant laboratory studies above.    I independently interpreted the following imaging study(s):   Pelvic ultrasound    EKG: I reviewed and independently interpreted the patient's EKG, with comments made as listed below. Please see scanned EKG for full report.       Procedures:  I was present for the key portions of procedures documented in JESSICA/midlevel note, see midlevel note for further details.    Chai Gale MD  [facility] EMERGENCY ROOM  [address] [facility] [address] [phone]     Chai Gale MD  11/22/24 9399

## 2024-11-22 NOTE — ED TRIAGE NOTES
"Arrives to ED with c/o lower abd cramping that began a few minutes prior to arrival. \"I need to nip this in the butt right away\". Reports occurs every month. Hx of ovarian cysts. Hx of hysterectomy. Reports \"leaking white stuff. I just had an appointment with my doctor so it's not STDs\".      Triage Assessment (Adult)       Row Name 11/22/24 1546          Triage Assessment    Airway WDL WDL        Respiratory WDL    Respiratory WDL WDL        Skin Circulation/Temperature WDL    Skin Circulation/Temperature WDL WDL        Cardiac WDL    Cardiac WDL WDL        Peripheral/Neurovascular WDL    Peripheral Neurovascular WDL WDL        Cognitive/Neuro/Behavioral WDL    Cognitive/Neuro/Behavioral WDL WDL                     "

## 2024-11-22 NOTE — ED PROVIDER NOTES
Emergency Department Encounter   NAME: Shannon Christopher  AGE: 34 year old female   YOB: 1990 ;   MRN: [id number] ;    ED PROVIDER: Kat Javier PA-C    PCP: Sveta Sandoval    Evaluation Date & Time:   11/22/2024  3:50 PM      CHIEF COMPLAINT:  Abdominal Pain      FINAL IMPRESSION:    ICD-10-CM    1. Chronic pelvic pain in female  R10.2     G89.29           IMPRESSION AND PLAN   MDM: Shannon Christopher is a 34 year old female with a pertinent history of MDD, cerebral palsy, borderline personality disorder, MOOK, bipolar 1 disorder who presents to the ED by walk-in for evaluation of BL lower abdominal pain x 2 days.  Patient reports she experiences similar pain every month which she relates to her ovaries.  She states her symptoms today feel very similar to her chronic pain in the past.  Patient is s/p partial hysterectomy.  She also endorses white vaginal discharge, she denies any STDs at this time.    Vitals stable. On exam abdomen is soft with mild tenderness to palpation of the bilateral lower quadrants, adnexal areas.  No suprapubic tenderness.  No CVA tenderness bilaterally.  Remainder of exam as detailed below.  Differentials include ovarian torsion, ovarian cyst, STD, UTI.  Low suspicion for appendicitis given monthly recurrent symptoms.    Patient given IM Toradol for initial symptom management.  UA negative for any infectious etiology.  Wet prep not concerning for trichomonas, yeast infection, BV.  Gonorrhea/chlamydia testing pending, she did recently test negative for these on 11/13/2024.  Pelvic US negative for ovarian torsion and ovarian cyst bilaterally.  Upon reevaluation, patient is resting much more comfortably in bed and remains vitally stable.  She reports significant pain relief with the IM Toradol.  I informed her of her negative workup including negative US and provided reassurance.  I informed her that she is likely experienced like symptoms even though she no longer has a uterus  since these pains occur around the same time every month.  I encouraged her to follow-up with her OB/GYN once she is established with to discuss further evaluation and management including possible surgery if patient is wanting to move forward with this.  Patient expressed her understanding and feels comfortable discharge at this time.    Patient seen in conjunction with Dr. Gale.      Medical Decision Making  Obtained supplemental history:Supplemental history obtained?: No  Reviewed external records: External records reviewed?: Documented in chart and Outpatient Record:  ED on 7/20/24  Care impacted by chronic illness:Other: cerebral palsy, polyp of colon  Care significantly affected by social determinants of health:N/A  Did you consider but not order tests?: Work up considered but not performed and documented in chart, if applicable  Did you interpret images independently?: Independent interpretation of ECG and images noted in documentation, when applicable.  Consultation discussion with other provider:Did you involve another provider (consultant, MH, pharmacy, etc.)?: No  Discharge. No recommendations on prescription strength medication(s). See documentation for any additional details.    Not Applicable       ED COURSE:  4:02 PM I met and introduced myself to the patient. I gathered initial history and performed my physical exam. We discussed plan for initial workup.   4:52 PM I have staffed the patient with Dr. Gale, ED MD, who has evaluated the patient and agrees with all aspects of today's care.   8:23 PM I rechecked the patient and discussed results, discharge, follow up, and reasons to return to the ED.           MEDICATIONS GIVEN IN THE EMERGENCY DEPARTMENT:  Medications   ketorolac (TORADOL) injection 30 mg (30 mg Intramuscular $Given 11/22/24 5992)         NEW PRESCRIPTIONS STARTED AT TODAY'S ED VISIT:  Discharge Medication List as of 11/22/2024  8:23 PM            BRIEF HPI   Patient information  "was obtained from: patient   Use of Intrepreter: N/A     Shannon Christopher is a 34 year old female with a pertinent history of cerebral palsy, polyp of colon, ovulation pain who presents to the ED by walk for evaluation of abdominal pain.     Per chart review,  Patient visited  ED on 7/20/24 regarding bilateral lower abdominal pain. Recently presented to the ED for similar concerns on 6/17 which found a negative UA and positive right ovarian hemorrhagic cyst following a transvaginal ultrasound. Toradol offered but patient preferred to be discharged home.     Patient reports bilateral lower abdominal pain following her hysterectomy years ago. The abdominal pain occurs approximately on the 15-17, 20-23, and the 30 of each month. No change in intensity of the pain. Today, the abdominal pain began a few hours ago and patient presents to the ED due to the pain being unbearable and wanting to \"nip it in the butt.\" Patient has taken Tylenol and Midol but denies any improvement in pain. She also endorses leaking white substance from the hysterectomy site. Patient reports nausea, changes in mood, unintended weight gain, unusual food cravings, increased frequency of urination, hematuria, fever, and chills. She also notes vomiting with previous abdominal pain but denies any today. Denies any dysuria.     Patient notes when she presented to the ED previously for similar symptoms she was diagnosed with an ovarian cyst. She indicate no pain medication has been able to manage her pain except Dilaudid.     No other complaints or concerns at this time.     REVIEW OF SYSTEMS:  Pertinent positive and negative symptoms per HPI.       MEDICAL HISTORY     Past Medical History:   Diagnosis Date    ADD (attention deficit disorder)     Anxiety     Borderline mental retardation     Borderline personality disorder (H)     Cerebral palsy (H)     Depression     PONV (postoperative nausea and vomiting)     Uterine fibroid        Past Surgical " "History:   Procedure Laterality Date    ADENOIDECTOMY      APPLY EXTERNAL FIXATOR LOWER EXTREMITY Left 5/18/2021    Procedure: CLOSED REDUCTION EXTERNAL FIXATION, LEFT ANKLE FRACTURE;  Surgeon: Dereck Nielson DO;  Location: [facility];  Service: Orthopedics    BUNIONECTOMY      EYE MUSCLE SURGERY      FOOT CAPSULE RELEASE W/ PERCUTANEOUS HEEL CORD LENGTHENING, TIBIAL TENDON TRANSFER Right     LAPAROSCOPIC HYSTERECTOMY N/A 1/4/2019    Procedure: ROBOTIC TOTAL LAPAROSCOPIC HYSTERECTOMY, BILATERAL SALPINGECTOMY LYSIS OF ADHESIONS;  Surgeon: Jose Golden MD;  Location: [facility] Main OR;  Service: Gynecology    OPEN REDUCTION INTERNAL FIXATION ANKLE Left 5/28/2021    Procedure: WITH OPEN REDUCTION INTERNAL FIXATION, TRIMALLEOLAR, LEFT ANKLE;  Surgeon: Dereck Costa DO;  Location: [facility] OR;  Service: Orthopedics    REMOVE HARDWARE LOWER EXTREMITY Left 5/28/2021    Procedure: LEFT ANKLE EXTERNAL FIXATION REMOVAL;  Surgeon: Dereck Costa DO;  Location: [facility];  Service: Orthopedics    TYMPANOPLASTY         No family history on file.    Social History     Tobacco Use    Smoking status: Some Days    Smokeless tobacco: Never    Tobacco comments:     < 1 PPW   Substance Use Topics    Alcohol use: Yes     Comment: Alcoholic Drinks/day: rarely    Drug use: No         PHYSICAL EXAM     First Vitals:  Patient Vitals for the past 24 hrs:   BP Temp Temp src Pulse Resp SpO2 Height Weight   11/22/24 1927 111/74 -- -- -- -- 99 % -- --   11/22/24 1545 125/74 97.1  F (36.2  C) Temporal 67 18 100 % 1.562 m (5' 1.5\") 55.7 kg (122 lb 12.8 oz)       PHYSICAL EXAM:  Physical Exam  Vitals and nursing note reviewed.   Constitutional:       General: She is not in acute distress.     Appearance: She is well-developed and normal weight. She is not ill-appearing or toxic-appearing.   HENT:      Head: Normocephalic and atraumatic.   Cardiovascular:      Rate and Rhythm: Normal rate. "   Pulmonary:      Effort: Pulmonary effort is normal.   Abdominal:      General: Abdomen is flat. There is no distension.      Palpations: Abdomen is soft.      Tenderness: There is abdominal tenderness in the right lower quadrant and left lower quadrant. There is no right CVA tenderness, left CVA tenderness, guarding or rebound. Negative signs include Zaragoza's sign and McBurney's sign.      Hernia: No hernia is present.   Skin:     General: Skin is warm and dry.   Neurological:      General: No focal deficit present.      Mental Status: She is alert and oriented to person, place, and time.   Psychiatric:         Mood and Affect: Mood is anxious.          RESULTS     LAB:  All pertinent labs reviewed and interpreted  Labs Ordered and Resulted from Time of ED Arrival to Time of ED Departure   ROUTINE UA WITH MICROSCOPIC REFLEX TO CULTURE - Abnormal       Result Value    Color Urine Colorless      Appearance Urine Clear      Glucose Urine Negative      Bilirubin Urine Negative      Ketones Urine 10 (*)     Specific Gravity Urine 1.007      Blood Urine Negative      pH Urine 8.0 (*)     Protein Albumin Urine Negative      Urobilinogen Urine <2.0      Nitrite Urine Negative      Leukocyte Esterase Urine Negative      Bacteria Urine Few (*)     Mucus Urine Present (*)     RBC Urine 0      WBC Urine <1      Squamous Epithelials Urine <1     WET PREPARATION - Abnormal    Trichomonas Absent      Yeast Absent      Clue Cells Absent      WBCs/high power field 1+ (*)    HCG QUALITATIVE URINE - Normal    hCG Urine Qualitative Negative     CHLAMYDIA TRACHOMATIS/NEISSERIA GONORRHOEAE BY PCR       RADIOLOGY:  US Pelvis Cmplt w Transvag & Doppler LmtPel Duplex Limited   Final Result   IMPRESSION:     Normal pelvic ultrasound status post hysterectomy.                     Gabbi WELDON, am serving as a scribe to document services personally performed by Kat Javier PA-C, based on my observation and the provider's statements to  me. I, Kat Javier PA-C attest that Gabbi Dooley is acting in a scribe capacity, has observed my performance of the services and has documented them in accordance with my direction.       Kat Javier PA-C  Emergency Medicine   [facility] EMERGENCY ROOM       Kat Javier PA-C  11/22/24 5003

## 2024-11-23 ENCOUNTER — NURSE TRIAGE (OUTPATIENT)
Dept: NURSING | Facility: CLINIC | Age: 34
End: 2024-11-23
Payer: MEDICARE

## 2024-11-23 LAB
C TRACH DNA SPEC QL PROBE+SIG AMP: NEGATIVE
N GONORRHOEA DNA SPEC QL NAA+PROBE: NEGATIVE

## 2024-11-23 NOTE — TELEPHONE ENCOUNTER
"Michelle Conley  Seen yesterday for excruciating pain in my ovaries.   They did pictures. Currently pain=\"10\" x a few days now. I have been taking OTC pain med Tylenol and Ibuprofen and nothing helps. I was seen in the ER and given Toredal injection and it didn't help. No fever, No nausea vomiting or diarrhea. Last BM today-\"I have been going a lot more\". My abd is bloating. I am incontinent. This happens every month from 15th-17th. My test results have been normal. I am scared--I don't know what is causing this pain.     Advised return to the ER. She has also been advised to follow up with OBGYN as instructed by ER MD.    Osiris Dominguez RN Triage Nurse Advisor 3:47 PM 11/23/2024  Reason for Disposition    [1] SEVERE pelvic pain AND [2] present > 1 hour    Protocols used: Pelvic Pain - Female-A-    "

## 2024-11-23 NOTE — DISCHARGE INSTRUCTIONS
Please continue to follow-up with your OBGYN for further evaluation and management of your pelvic pain. Thankfully, your workup here in the ED was negative as we discussed. Please return to the ED for new or worsening symptoms.    [facility] Emergency Department  [address] [address]

## 2025-02-22 ENCOUNTER — HOSPITAL ENCOUNTER (EMERGENCY)
Facility: CLINIC | Age: 35
Discharge: HOME | End: 2025-02-22
Attending: EMERGENCY MEDICINE | Admitting: EMERGENCY MEDICINE
Payer: MEDICARE

## 2025-02-22 ENCOUNTER — APPOINTMENT (OUTPATIENT)
Dept: CT IMAGING | Facility: CLINIC | Age: 35
End: 2025-02-22
Attending: EMERGENCY MEDICINE
Payer: MEDICARE

## 2025-02-22 VITALS
RESPIRATION RATE: 22 BRPM | DIASTOLIC BLOOD PRESSURE: 87 MMHG | BODY MASS INDEX: 23.79 KG/M2 | SYSTOLIC BLOOD PRESSURE: 152 MMHG | WEIGHT: 126 LBS | HEART RATE: 89 BPM | OXYGEN SATURATION: 100 % | TEMPERATURE: 97.5 F | HEIGHT: 61 IN

## 2025-02-22 DIAGNOSIS — R10.2 PELVIC PAIN IN FEMALE: ICD-10-CM

## 2025-02-22 LAB
ALBUMIN SERPL BCG-MCNC: 4.8 G/DL (ref 3.5–5.2)
ALBUMIN UR-MCNC: NEGATIVE MG/DL
ALP SERPL-CCNC: 51 U/L (ref 40–150)
ALT SERPL W P-5'-P-CCNC: 19 U/L (ref 0–50)
AMORPH CRY #/AREA URNS HPF: ABNORMAL /HPF
ANION GAP SERPL CALCULATED.3IONS-SCNC: 13 MMOL/L (ref 7–15)
APPEARANCE UR: CLEAR
AST SERPL W P-5'-P-CCNC: 28 U/L (ref 0–45)
BASOPHILS # BLD AUTO: 0 10E3/UL (ref 0–0.2)
BASOPHILS NFR BLD AUTO: 0 %
BILIRUB SERPL-MCNC: 0.3 MG/DL
BILIRUB UR QL STRIP: NEGATIVE
BUN SERPL-MCNC: 10.6 MG/DL (ref 6–20)
CALCIUM SERPL-MCNC: 9.6 MG/DL (ref 8.8–10.4)
CHLORIDE SERPL-SCNC: 107 MMOL/L (ref 98–107)
CLUE CELLS: ABNORMAL
COLOR UR AUTO: ABNORMAL
CREAT SERPL-MCNC: 0.58 MG/DL (ref 0.51–0.95)
EGFRCR SERPLBLD CKD-EPI 2021: >90 ML/MIN/1.73M2
EOSINOPHIL # BLD AUTO: 0 10E3/UL (ref 0–0.7)
EOSINOPHIL NFR BLD AUTO: 0 %
ERYTHROCYTE [DISTWIDTH] IN BLOOD BY AUTOMATED COUNT: 13 % (ref 10–15)
GLUCOSE SERPL-MCNC: 90 MG/DL (ref 70–99)
GLUCOSE UR STRIP-MCNC: NEGATIVE MG/DL
HCO3 SERPL-SCNC: 20 MMOL/L (ref 22–29)
HCT VFR BLD AUTO: 40.7 % (ref 35–47)
HGB BLD-MCNC: 14.7 G/DL (ref 11.7–15.7)
HGB UR QL STRIP: NEGATIVE
HOLD SPECIMEN: NORMAL
HOLD SPECIMEN: NORMAL
IMM GRANULOCYTES # BLD: 0 10E3/UL
IMM GRANULOCYTES NFR BLD: 0 %
KETONES UR STRIP-MCNC: 10 MG/DL
LEUKOCYTE ESTERASE UR QL STRIP: NEGATIVE
LIPASE SERPL-CCNC: 35 U/L (ref 13–60)
LYMPHOCYTES # BLD AUTO: 3.4 10E3/UL (ref 0.8–5.3)
LYMPHOCYTES NFR BLD AUTO: 37 %
MCH RBC QN AUTO: 31.8 PG (ref 26.5–33)
MCHC RBC AUTO-ENTMCNC: 36.1 G/DL (ref 31.5–36.5)
MCV RBC AUTO: 88 FL (ref 78–100)
MONOCYTES # BLD AUTO: 0.6 10E3/UL (ref 0–1.3)
MONOCYTES NFR BLD AUTO: 7 %
NEUTROPHILS # BLD AUTO: 4.9 10E3/UL (ref 1.6–8.3)
NEUTROPHILS NFR BLD AUTO: 55 %
NITRATE UR QL: NEGATIVE
NRBC # BLD AUTO: 0 10E3/UL
NRBC BLD AUTO-RTO: 0 /100
PH UR STRIP: 7.5 [PH] (ref 5–7)
PLATELET # BLD AUTO: 272 10E3/UL (ref 150–450)
POTASSIUM SERPL-SCNC: 3.7 MMOL/L (ref 3.4–5.3)
PROT SERPL-MCNC: 7.6 G/DL (ref 6.4–8.3)
RBC # BLD AUTO: 4.62 10E6/UL (ref 3.8–5.2)
RBC URINE: <1 /HPF
SODIUM SERPL-SCNC: 140 MMOL/L (ref 135–145)
SP GR UR STRIP: 1.01 (ref 1–1.03)
SQUAMOUS EPITHELIAL: <1 /HPF
TRICHOMONAS, WET PREP: ABNORMAL
UROBILINOGEN UR STRIP-MCNC: <2 MG/DL
WBC # BLD AUTO: 9 10E3/UL (ref 4–11)
WBC URINE: <1 /HPF
WBC'S/HIGH POWER FIELD, WET PREP: ABNORMAL
YEAST, WET PREP: ABNORMAL

## 2025-02-22 PROCEDURE — 83690 ASSAY OF LIPASE: CPT | Performed by: EMERGENCY MEDICINE

## 2025-02-22 PROCEDURE — 99285 EMERGENCY DEPT VISIT HI MDM: CPT | Mod: 25

## 2025-02-22 PROCEDURE — 36415 COLL VENOUS BLD VENIPUNCTURE: CPT | Performed by: EMERGENCY MEDICINE

## 2025-02-22 PROCEDURE — 87210 SMEAR WET MOUNT SALINE/INK: CPT | Performed by: EMERGENCY MEDICINE

## 2025-02-22 PROCEDURE — 250N000011 HC RX IP 250 OP 636: Performed by: EMERGENCY MEDICINE

## 2025-02-22 PROCEDURE — 85025 COMPLETE CBC W/AUTO DIFF WBC: CPT | Performed by: EMERGENCY MEDICINE

## 2025-02-22 PROCEDURE — 74177 CT ABD & PELVIS W/CONTRAST: CPT

## 2025-02-22 PROCEDURE — 80053 COMPREHEN METABOLIC PANEL: CPT | Performed by: EMERGENCY MEDICINE

## 2025-02-22 PROCEDURE — 87491 CHLMYD TRACH DNA AMP PROBE: CPT | Performed by: EMERGENCY MEDICINE

## 2025-02-22 PROCEDURE — 81003 URINALYSIS AUTO W/O SCOPE: CPT | Performed by: EMERGENCY MEDICINE

## 2025-02-22 RX ORDER — IOPAMIDOL 755 MG/ML
90 INJECTION, SOLUTION INTRAVASCULAR ONCE
Status: COMPLETED | OUTPATIENT
Start: 2025-02-22 | End: 2025-02-22

## 2025-02-22 RX ORDER — KETOROLAC TROMETHAMINE 15 MG/ML
15 INJECTION, SOLUTION INTRAMUSCULAR; INTRAVENOUS ONCE
Status: DISCONTINUED | OUTPATIENT
Start: 2025-02-22 | End: 2025-02-23 | Stop reason: HOSPADM

## 2025-02-22 RX ADMIN — IOPAMIDOL 90 ML: 755 INJECTION, SOLUTION INTRAVENOUS at 21:02

## 2025-02-22 ASSESSMENT — COLUMBIA-SUICIDE SEVERITY RATING SCALE - C-SSRS
2. HAVE YOU ACTUALLY HAD ANY THOUGHTS OF KILLING YOURSELF IN THE PAST MONTH?: NO
6. HAVE YOU EVER DONE ANYTHING, STARTED TO DO ANYTHING, OR PREPARED TO DO ANYTHING TO END YOUR LIFE?: YES
1. IN THE PAST MONTH, HAVE YOU WISHED YOU WERE DEAD OR WISHED YOU COULD GO TO SLEEP AND NOT WAKE UP?: YES

## 2025-02-22 ASSESSMENT — ACTIVITIES OF DAILY LIVING (ADL): ADLS_ACUITY_SCORE: 41

## 2025-02-22 NOTE — ED TRIAGE NOTES
Pt reports she is here because she needs a paper trail for the OB. Not pregnant but reports has had partial hysterectomy. Reports gets this abdominal pain twice a month and every time she ovulates and it knocks her out without how much pain it is. Reports bilateral lower abdominal stabbing pain that radiates to her back. +nausea, +HA, +vomiting. Pt alert.     Triage Assessment (Adult)       Row Name 02/22/25 1631          Triage Assessment    Airway WDL WDL        Respiratory WDL    Respiratory WDL X;rhythm/pattern     Rhythm/Pattern, Respiratory shortness of breath        Skin Circulation/Temperature WDL    Skin Circulation/Temperature WDL WDL        Cardiac WDL    Cardiac WDL WDL        Peripheral/Neurovascular WDL    Peripheral Neurovascular WDL WDL        Cognitive/Neuro/Behavioral WDL    Cognitive/Neuro/Behavioral WDL X;mood/behavior     Level of Consciousness alert     Arousal Level opens eyes spontaneously     Orientation oriented x 4     Speech spontaneous;clear     Mood/Behavior anxious        Pupils (CN II)    Pupil Size Left 4 mm     Pupil Size Right 4 mm        Konstantin Coma Scale    Best Eye Response 4-->(E4) spontaneous     Best Motor Response 6-->(M6) obeys commands     Best Verbal Response 5-->(V5) oriented     Konstantin Coma Scale Score 15

## 2025-02-23 LAB
C TRACH DNA SPEC QL PROBE+SIG AMP: NEGATIVE
N GONORRHOEA DNA SPEC QL NAA+PROBE: NEGATIVE
SPECIMEN TYPE: NORMAL

## 2025-02-23 NOTE — DISCHARGE INSTRUCTIONS
Read and follow the discharge instructions.    Your blood work including your white blood cell, your kidney function, your liver function, were normal.    Your CT shows absence of your uterus.  Normal ovaries.  No obstruction or inflammation.    Call your gynecologist on Monday to let them know you are in the emergency department to review your CT results.    You may take Tylenol as needed for pain continue your other medications.    Return for worsening symptoms or any other concerns.

## 2025-02-23 NOTE — ED PROVIDER NOTES
EMERGENCY DEPARTMENT ENCOUNTER      NAME: Shannon Christopher  AGE: 34 year old female  YOB: 1990  MRN: [id number]  EVALUATION DATE & TIME: No admission date for patient encounter.    PCP: Sveta Sandoval    ED PROVIDER: Margaret Plummer M.D.      CHIEF COMPLAINT     Chief Complaint   Patient presents with    Abdominal Pain         FINAL IMPRESSION:     1. Pelvic pain in female          MEDICAL DECISION MAKING:     ED Course as of 02/22/25 2316   Sat Feb 22, 2025   2310 Ms. Christopher is a 34-year-old male female presents complaining of abdominal pain.  She states since she had her hysterectomy she gets episodes of abdominal bloatedness breast tenderness she gets them for a total of 2 weeks every month.  During those months she feels like she has some white drainage.  She is sexually active and states no concern about STDs.  She is saying she has an OB follow-up for 7 March and would like to have records of her symptoms.   2311 Her abdominal pain is not associated with eating.  She denies any dysuria no change in bowel movements denies chest pain or shortness of breath.   2311 During initial evaluation patient states she is upset about the wait and states she feels like killing someone but immediately stated that she was inguinal kill anyone she is just upset about her level of pain.   2312 She denies feeling suicidal homicidal states she is safe.  She came to the emergency department via uber   2312 On exam she is well-appearing nontoxic benign abdominal examination no guarding or rebound  exam with a chaperone for more normal  external vaginal area.  Speculum exam reveals absence of cervix.  White discharge no purulence bimanual examination reveals no adnexal masses.   2313 Differential diagnosis for pelvic pain that occurs twice a month since hysterectomy includes but not limited to includes but not limited to ovarian mass, ovarian cyst, less likely malignancy, appendicitis obstruction or radiculitis was  also considered.   2314 Lab work is unremarkable.   2314 CT abdomen and pelvis independently interpreted by me reveals no free air formal read by radiologist.  No acute abnormalities.   2314 Patient updated.  She declined Toradol states she wanted Vicodin.   2315 I spoke at length with patient informing given the chronicity of her symptoms narcotics medications are prudent.  She verbalizes understanding she has a follow-up appointment March 7.  Feels comfortable going home.  Strict discharge instructions and return precautions given.   2315 I considered pelvic ultrasound.  There is no pathology seen on CT.  She is otherwise well-appearing.  Symptoms have been present since 2019 2316 Patient discharged in stable condition with strict return precautions.       Medical Decision Making    History:  Supplemental history from: patient  External Record(s) reviewed: Glipho 1/9/2025 hx mild intellectual disability, cerebral palsy, BPD, depression     Work Up:  Chart documentation includes differential considered and any EKGs or imaging independently interpreted by provider, where specified.  In additional to work up documented, I considered the following work up: antibiotics no evidence of infection    External consultation:  Discussion of management with another provider: n/a    Complicating factors:  Care impacted by chronic illness: mental health, cerebral palsy, ADD, smoking      Disposition considerations: Discharge. No recommendations on prescription strength medication(s). See documentation for any additional details.    MIPS Not Applicable        ED COURSE     8:43 PM I met with the patient, obtained history, performed an initial exam, and discussed options and plan for diagnostics and treatment here in the ED.  9:45 PM Checked in on and updated patient. She says she has been having this pain since her hysterectomy. We discussed the plan for discharge and the patient is agreeable. Reviewed supportive cares,  "symptomatic treatment, outpatient follow up, and reasons to return to the Emergency Department. Patient to be discharged by ED RN.     At the conclusion of the encounter I discussed the results of all of the tests and the disposition. The questions were answered. The patient acknowledged understanding and was agreeable with the care plan.         MEDICATIONS GIVEN IN THE EMERGENCY:     Medications   ketorolac (TORADOL) injection 15 mg (has no administration in time range)   iopamidol (ISOVUE-370) solution 90 mL (90 mLs Intravenous $Given 2/22/25 2102)       NEW PRESCRIPTIONS STARTED AT TODAY'S ER VISIT     Discharge Medication List as of 2/22/2025 10:21 PM             =================================================================    HPI     Patient information was obtained from: patient    Use of : N/A         Shannon Christopher is a 34 year old female who presents by walk in for evaluation of abdominal pain.    For \"many months\", the patient has had intermittent lower abdominal pain that consistently onsets from the 5th-15th and 22nd-the end of the month. During these periods of time, she reports associated abdominal bloating, breast swelling, and frequent urination, as well as states her psychiatric medications do not work as well. S/p partial hysterectomy. She still has her ovaries and notes she sometimes has sudden white discharge. Patient is sexually active and denies concern for STD/STI. She follows with Dr. Aleah WARNER in [place] and already has an appointment for 3/7. Unsure of family history as she is adopted. Patient denies dysuria, SI, HI, or any other complaints at this time.       REVIEW OF SYSTEMS   Review of Systems   SEE HPI    PAST MEDICAL HISTORY:     Past Medical History:   Diagnosis Date    ADD (attention deficit disorder)     Anxiety     Borderline mental retardation     Borderline personality disorder (H)     Cerebral palsy (H)     Depression     PONV (postoperative nausea and " vomiting)     Uterine fibroid        PAST SURGICAL HISTORY:     Past Surgical History:   Procedure Laterality Date    ADENOIDECTOMY      APPLY EXTERNAL FIXATOR LOWER EXTREMITY Left 5/18/2021    Procedure: CLOSED REDUCTION EXTERNAL FIXATION, LEFT ANKLE FRACTURE;  Surgeon: Dereck Nielson DO;  Location: [facility];  Service: Orthopedics    BUNIONECTOMY      EYE MUSCLE SURGERY      FOOT CAPSULE RELEASE W/ PERCUTANEOUS HEEL CORD LENGTHENING, TIBIAL TENDON TRANSFER Right     LAPAROSCOPIC HYSTERECTOMY N/A 1/4/2019    Procedure: ROBOTIC TOTAL LAPAROSCOPIC HYSTERECTOMY, BILATERAL SALPINGECTOMY LYSIS OF ADHESIONS;  Surgeon: Jose Golden MD;  Location: [facility] OR;  Service: Gynecology    OPEN REDUCTION INTERNAL FIXATION ANKLE Left 5/28/2021    Procedure: WITH OPEN REDUCTION INTERNAL FIXATION, TRIMALLEOLAR, LEFT ANKLE;  Surgeon: Dereck Costa DO;  Location: [facility] OR;  Service: Orthopedics    REMOVE HARDWARE LOWER EXTREMITY Left 5/28/2021    Procedure: LEFT ANKLE EXTERNAL FIXATION REMOVAL;  Surgeon: Dereck Costa DO;  Location: [facility] OR;  Service: Orthopedics    TYMPANOPLASTY           CURRENT MEDICATIONS:   divalproex (DEPAKOTE ER) 500 MG 24 hour tablet  LATUDA 60 mg Tab tablet  metFORMIN (GLUCOPHAGE XR) 500 MG 24 hr tablet  OLANZapine (ZYPREXA) 2.5 MG tablet  OLANZapine (ZYPREXA) 7.5 MG tablet  zonisamide (ZONEGRAN) 100 MG capsule         ALLERGIES:   No Known Allergies    FAMILY HISTORY:   History reviewed. No pertinent family history.    SOCIAL HISTORY:     Social History     Socioeconomic History    Marital status: Single   Tobacco Use    Smoking status: Some Days    Smokeless tobacco: Never    Tobacco comments:     < 1 PPW   Substance and Sexual Activity    Alcohol use: Yes     Comment: Alcoholic Drinks/day: rarely    Drug use: No   Social History Narrative    Patient arrived alone to the ED 08/13/17       Social Drivers of Health     Financial Resource Strain:  "Medium Risk (4/23/2024)    Received from [facility]    Financial Resource Strain     Difficulty of Paying Living Expenses: 2     Difficulty of Paying Living Expenses: 1   Food Insecurity: Patient Unable To Answer (1/9/2025)    Received from [facility]    Hunger Vital Sign     Worried About Running Out of Food in the Last Year: Patient unable to answer     Ran Out of Food in the Last Year: Patient unable to answer   Transportation Needs: Patient Unable To Answer (1/9/2025)    Received from [facility]    PRAPARE - Transportation     Lack of Transportation (Medical): Patient unable to answer     Lack of Transportation (Non-Medical): Patient unable to answer   Social Connections: Socially Isolated (4/23/2024)    Received from [facility]    Social Connections     Do you often feel lonely or isolated from those around you?: 4   Interpersonal Safety: Patient Unable To Answer (1/9/2025)    Received from [facility]    Humiliation, Afraid, Rape, and Kick questionnaire     Fear of Current or Ex-Partner: Patient unable to answer     Emotionally Abused: Patient unable to answer     Physically Abused: Patient unable to answer     Sexually Abused: Patient unable to answer   Housing Stability: Patient Unable To Answer (1/9/2025)    Received from [facility]    Housing Stability Vital Sign     Unable to Pay for Housing in the Last Year: Patient unable to answer     Homeless in the Last Year: Patient unable to answer       VITALS:   BP (!) 152/87   Pulse 89   Temp 97.5  F (36.4  C) (Oral)   Resp 22   Ht 1.549 m (5' 1\")   Wt 57.2 kg (126 lb)   SpO2 100%   BMI 23.81 kg/m      PHYSICAL EXAM     Physical Exam  Vitals and nursing note reviewed. Exam conducted with a chaperone present.   Constitutional:       Appearance: She is well-developed and normal weight.   Abdominal:      Hernia: There is no hernia in the left inguinal area or right inguinal " area.   Genitourinary:     General: Normal vulva.      Exam position: Knee-chest position.      Pubic Area: No rash or pubic lice.       Morris stage (genital): 5.      Labia:         Right: No rash or lesion.         Left: No rash or lesion.       Urethra: No prolapse.      Vagina: Normal.      Uterus: Absent.       Adnexa: Right adnexa normal and left adnexa normal.      Rectum: Normal.   Lymphadenopathy:      Lower Body: No right inguinal adenopathy. No left inguinal adenopathy.   Neurological:      Mental Status: She is alert.         Physical Exam   Constitutional: Well appearing, non toxic. Walks with a limp.     Head: Atraumatic.     Nose: Nose normal.     Mouth/Throat: Oropharynx is clear and moist.     Eyes: EOM are normal. Pupils are equal, round, and reactive to light.     Ears: Bilateral pearly white tympanic membranes.    Neck: Normal range of motion. Neck supple.     Cardiovascular: Normal rate, regular rhythm and normal heart sounds.  2+ femoral pulses/radial/DP pulses B    Pulmonary/Chest: Normal effort  and breath sounds normal.     Abdominal: soft nontender. Surgical scar.     Musculoskeletal: Normal range of motion.     Neurological: Moves upper and lower extremities equally.    Lymphatics: no edema, no calves pain, no palpable cords.    : NA    Skin: Skin is warm and dry.     Psychiatric: Normal mood and affect. Behavior is normal. No active SI or HI.       LAB:     All pertinent labs reviewed and interpreted.  Labs Ordered and Resulted from Time of ED Arrival to Time of ED Departure   COMPREHENSIVE METABOLIC PANEL - Abnormal       Result Value    Sodium 140      Potassium 3.7      Carbon Dioxide (CO2) 20 (*)     Anion Gap 13      Urea Nitrogen 10.6      Creatinine 0.58      GFR Estimate >90      Calcium 9.6      Chloride 107      Glucose 90      Alkaline Phosphatase 51      AST 28      ALT 19      Protein Total 7.6      Albumin 4.8      Bilirubin Total 0.3     ROUTINE UA WITH MICROSCOPIC  REFLEX TO CULTURE - Abnormal    Color Urine Light Yellow      Appearance Urine Clear      Glucose Urine Negative      Bilirubin Urine Negative      Ketones Urine 10 (*)     Specific Gravity Urine 1.012      Blood Urine Negative      pH Urine 7.5 (*)     Protein Albumin Urine Negative      Urobilinogen Urine <2.0      Nitrite Urine Negative      Leukocyte Esterase Urine Negative      Amorphous Crystals Urine Few (*)     RBC Urine <1      WBC Urine <1      Squamous Epithelials Urine <1     WET PREPARATION - Abnormal    Trichomonas Absent      Yeast Absent      Clue Cells Absent      WBCs/high power field 1+ (*)    LIPASE - Normal    Lipase 35     CBC WITH PLATELETS AND DIFFERENTIAL    WBC Count 9.0      RBC Count 4.62      Hemoglobin 14.7      Hematocrit 40.7      MCV 88      MCH 31.8      MCHC 36.1      RDW 13.0      Platelet Count 272      % Neutrophils 55      % Lymphocytes 37      % Monocytes 7      % Eosinophils 0      % Basophils 0      % Immature Granulocytes 0      NRBCs per 100 WBC 0      Absolute Neutrophils 4.9      Absolute Lymphocytes 3.4      Absolute Monocytes 0.6      Absolute Eosinophils 0.0      Absolute Basophils 0.0      Absolute Immature Granulocytes 0.0      Absolute NRBCs 0.0     CHLAMYDIA TRACHOMATIS/NEISSERIA GONORRHOEAE BY PCR        RADIOLOGY:     Reviewed all pertinent imaging. Please see official radiology report.  CT Abdomen Pelvis w Contrast   Final Result   IMPRESSION:    1. No acute findings in the abdomen or pelvis.   2. No mass or adenopathy.           EKG:       I have independently reviewed and interpreted the EKG(s) documented above.      PROCEDURES:     Procedures      I, Janette, am serving as a scribe to document services personally performed by Dr. Plummer based on my observation and the provider's statements to me. I, Margaret Plummer MD attest that Janette is acting in a scribe capacity, has observed my performance of the services and has documented them in accordance with my  direction.    Margaret Plummer M.D.  Emergency Medicine  [facility] EMERGENCY ROOM  [address] [address] [phone]  Dept: [phone]       Margaret Plummer MD  02/22/25 7188

## 2025-03-27 ENCOUNTER — HOSPITAL ENCOUNTER (EMERGENCY)
Facility: CLINIC | Age: 35
End: 2025-03-27
Attending: EMERGENCY MEDICINE
Payer: MEDICARE

## 2025-03-27 VITALS
DIASTOLIC BLOOD PRESSURE: 59 MMHG | HEART RATE: 75 BPM | SYSTOLIC BLOOD PRESSURE: 118 MMHG | BODY MASS INDEX: 21.16 KG/M2 | OXYGEN SATURATION: 100 % | RESPIRATION RATE: 23 BRPM | HEIGHT: 62 IN | WEIGHT: 115 LBS | TEMPERATURE: 97.7 F

## 2025-03-27 DIAGNOSIS — R07.9 CHEST PAIN, UNSPECIFIED TYPE: ICD-10-CM

## 2025-03-27 LAB
ATRIAL RATE - MUSE: 70 BPM
BASOPHILS # BLD AUTO: 0 10E3/UL (ref 0–0.2)
BASOPHILS NFR BLD AUTO: 0 %
DIASTOLIC BLOOD PRESSURE - MUSE: 69 MMHG
EOSINOPHIL # BLD AUTO: 0 10E3/UL (ref 0–0.7)
EOSINOPHIL NFR BLD AUTO: 1 %
ERYTHROCYTE [DISTWIDTH] IN BLOOD BY AUTOMATED COUNT: 12.1 % (ref 10–15)
HCT VFR BLD AUTO: 40 % (ref 35–47)
HGB BLD-MCNC: 13.8 G/DL (ref 11.7–15.7)
IMM GRANULOCYTES # BLD: 0 10E3/UL
IMM GRANULOCYTES NFR BLD: 0 %
INTERPRETATION ECG - MUSE: NORMAL
LYMPHOCYTES # BLD AUTO: 2.9 10E3/UL (ref 0.8–5.3)
LYMPHOCYTES NFR BLD AUTO: 34 %
MCH RBC QN AUTO: 31.4 PG (ref 26.5–33)
MCHC RBC AUTO-ENTMCNC: 34.5 G/DL (ref 31.5–36.5)
MCV RBC AUTO: 91 FL (ref 78–100)
MONOCYTES # BLD AUTO: 0.7 10E3/UL (ref 0–1.3)
MONOCYTES NFR BLD AUTO: 8 %
NEUTROPHILS # BLD AUTO: 4.8 10E3/UL (ref 1.6–8.3)
NEUTROPHILS NFR BLD AUTO: 56 %
NRBC # BLD AUTO: 0 10E3/UL
NRBC BLD AUTO-RTO: 0 /100
P AXIS - MUSE: 55 DEGREES
PLATELET # BLD AUTO: 263 10E3/UL (ref 150–450)
PR INTERVAL - MUSE: 130 MS
QRS DURATION - MUSE: 76 MS
QT - MUSE: 404 MS
QTC - MUSE: 436 MS
R AXIS - MUSE: 84 DEGREES
RBC # BLD AUTO: 4.39 10E6/UL (ref 3.8–5.2)
SYSTOLIC BLOOD PRESSURE - MUSE: 126 MMHG
T AXIS - MUSE: 40 DEGREES
VENTRICULAR RATE- MUSE: 70 BPM
WBC # BLD AUTO: 8.5 10E3/UL (ref 4–11)

## 2025-03-27 PROCEDURE — 93005 ELECTROCARDIOGRAM TRACING: CPT | Performed by: EMERGENCY MEDICINE

## 2025-03-27 PROCEDURE — 84484 ASSAY OF TROPONIN QUANT: CPT | Performed by: EMERGENCY MEDICINE

## 2025-03-27 PROCEDURE — 85730 THROMBOPLASTIN TIME PARTIAL: CPT | Performed by: EMERGENCY MEDICINE

## 2025-03-27 PROCEDURE — 83735 ASSAY OF MAGNESIUM: CPT | Performed by: EMERGENCY MEDICINE

## 2025-03-27 PROCEDURE — 85610 PROTHROMBIN TIME: CPT | Performed by: EMERGENCY MEDICINE

## 2025-03-27 PROCEDURE — 36415 COLL VENOUS BLD VENIPUNCTURE: CPT | Performed by: EMERGENCY MEDICINE

## 2025-03-27 PROCEDURE — 99285 EMERGENCY DEPT VISIT HI MDM: CPT | Mod: 25

## 2025-03-27 PROCEDURE — 250N000011 HC RX IP 250 OP 636: Performed by: EMERGENCY MEDICINE

## 2025-03-27 PROCEDURE — 83690 ASSAY OF LIPASE: CPT | Performed by: EMERGENCY MEDICINE

## 2025-03-27 PROCEDURE — 250N000013 HC RX MED GY IP 250 OP 250 PS 637: Performed by: EMERGENCY MEDICINE

## 2025-03-27 PROCEDURE — 96374 THER/PROPH/DIAG INJ IV PUSH: CPT

## 2025-03-27 PROCEDURE — 85004 AUTOMATED DIFF WBC COUNT: CPT | Performed by: EMERGENCY MEDICINE

## 2025-03-27 PROCEDURE — 80053 COMPREHEN METABOLIC PANEL: CPT | Performed by: EMERGENCY MEDICINE

## 2025-03-27 RX ORDER — MAGNESIUM HYDROXIDE/ALUMINUM HYDROXICE/SIMETHICONE 120; 1200; 1200 MG/30ML; MG/30ML; MG/30ML
15 SUSPENSION ORAL ONCE
Status: COMPLETED | OUTPATIENT
Start: 2025-03-27 | End: 2025-03-27

## 2025-03-27 RX ADMIN — ALUMINUM HYDROXIDE, MAGNESIUM HYDROXIDE, AND SIMETHICONE 15 ML: 1200; 120; 1200 SUSPENSION ORAL at 23:56

## 2025-03-27 RX ADMIN — FAMOTIDINE 20 MG: 10 INJECTION, SOLUTION INTRAVENOUS at 23:56

## 2025-03-27 ASSESSMENT — ACTIVITIES OF DAILY LIVING (ADL): ADLS_ACUITY_SCORE: 41

## 2025-03-27 ASSESSMENT — ENCOUNTER SYMPTOMS
VOMITING: 0
SHORTNESS OF BREATH: 1
NAUSEA: 0

## 2025-03-27 ASSESSMENT — COLUMBIA-SUICIDE SEVERITY RATING SCALE - C-SSRS
2. HAVE YOU ACTUALLY HAD ANY THOUGHTS OF KILLING YOURSELF IN THE PAST MONTH?: NO
1. IN THE PAST MONTH, HAVE YOU WISHED YOU WERE DEAD OR WISHED YOU COULD GO TO SLEEP AND NOT WAKE UP?: NO
6. HAVE YOU EVER DONE ANYTHING, STARTED TO DO ANYTHING, OR PREPARED TO DO ANYTHING TO END YOUR LIFE?: NO

## 2025-03-28 ENCOUNTER — APPOINTMENT (OUTPATIENT)
Dept: RADIOLOGY | Facility: CLINIC | Age: 35
End: 2025-03-28
Attending: EMERGENCY MEDICINE
Payer: MEDICARE

## 2025-03-28 LAB
ALBUMIN SERPL BCG-MCNC: 4.2 G/DL (ref 3.5–5.2)
ALP SERPL-CCNC: 42 U/L (ref 40–150)
ALT SERPL W P-5'-P-CCNC: 19 U/L (ref 0–50)
ANION GAP SERPL CALCULATED.3IONS-SCNC: 14 MMOL/L (ref 7–15)
APTT PPP: 28 SECONDS (ref 22–38)
AST SERPL W P-5'-P-CCNC: 23 U/L (ref 0–45)
BILIRUB SERPL-MCNC: 0.3 MG/DL
BUN SERPL-MCNC: 11.2 MG/DL (ref 6–20)
CALCIUM SERPL-MCNC: 9.1 MG/DL (ref 8.8–10.4)
CHLORIDE SERPL-SCNC: 106 MMOL/L (ref 98–107)
CREAT SERPL-MCNC: 0.62 MG/DL (ref 0.51–0.95)
EGFRCR SERPLBLD CKD-EPI 2021: >90 ML/MIN/1.73M2
GLUCOSE SERPL-MCNC: 97 MG/DL (ref 70–99)
HCO3 SERPL-SCNC: 20 MMOL/L (ref 22–29)
INR PPP: 1.01 (ref 0.85–1.15)
LIPASE SERPL-CCNC: 38 U/L (ref 13–60)
MAGNESIUM SERPL-MCNC: 2.1 MG/DL (ref 1.7–2.3)
POTASSIUM SERPL-SCNC: 3.4 MMOL/L (ref 3.4–5.3)
PROT SERPL-MCNC: 6.5 G/DL (ref 6.4–8.3)
SODIUM SERPL-SCNC: 140 MMOL/L (ref 135–145)
TROPONIN T SERPL HS-MCNC: <6 NG/L

## 2025-03-28 PROCEDURE — 71046 X-RAY EXAM CHEST 2 VIEWS: CPT

## 2025-03-28 RX ORDER — POLYETHYLENE GLYCOL 3350 17 G/17G
1 POWDER, FOR SOLUTION ORAL DAILY
Qty: 527 G | Refills: 0 | Status: SHIPPED | OUTPATIENT
Start: 2025-03-28 | End: 2025-04-27

## 2025-03-28 ASSESSMENT — ACTIVITIES OF DAILY LIVING (ADL): ADLS_ACUITY_SCORE: 41

## 2025-03-28 NOTE — DISCHARGE INSTRUCTIONS
The EKG, chest x-ray, and all laboratory tests including cardiac enzymes appear reassuring.      Continue taking your home medications as directed.      Follow-up with your primary care provider for reevaluation.  Return back to ED sooner for any worsening chest pain, shortness breath, or any other new or concerning symptoms.

## 2025-03-28 NOTE — ED PROVIDER NOTES
"EMERGENCY DEPARTMENT ENCOUNTER      NAME: Shannon Christopher  AGE: 34 year old female  YOB: 1990  MRN: [id number]  EVALUATION DATE & TIME: 3/27/2025 10:56 PM    PCP: Sveta Sandoval    ED PROVIDER: Abiel Curry DO      Chief Complaint   Patient presents with    Chest Pain         FINAL IMPRESSION:  No diagnosis found.      ED COURSE & MEDICAL DECISION MAKIN year old female presents to the Emergency Department for evaluation of ***    Pertinent Labs & Imaging studies reviewed. (See chart for details)    11:09 PM I met with the patient, obtained history, performed an initial exam, and discussed options and plan for diagnostics and treatment here in the ED.   ***  *** We discussed plans for discharge including supportive cares, symptomatic treatment, outpatient follow up, and reasons to return to the emergency department.  *** I spoke with  *** (hospitalist team), who accepts the patient for admission.          At the conclusion of the encounter I discussed the results of all of the tests and the disposition. The questions were answered. The patient or family acknowledged understanding and was agreeable with the care plan.       Medical Decision Making  {DID YOU REMEMBER TO DOCUMENT...?:209950}  {ADMIT VS D/C:407710}    MIPS (CTPE, Dental pain, Patrick, Sinusitis, Asthma/COPD, Head Trauma): {ECC MIPS DOCUMENTATION:945682}    SEPSIS: {Sepsis/Stemi/Stroke:808723::\"None\"}       *** minutes of critical care time   PPE worn: n95 mask, goggles    MEDICATIONS GIVEN IN THE EMERGENCY:  Medications - No data to display    NEW PRESCRIPTIONS STARTED AT TODAY'S ER VISIT  New Prescriptions    No medications on file          =================================================================    HPI    Patient information was obtained from: the patient    Use of : N/A         Shannon Christopher is a 34 year old female with a pertinent history of stimulant abuse, cerebral palsy and anxiety who presents to " this ED via EMS for evaluation of chest pain.     The patient presents with one week (since 3/20/2025) of intermittent substernal chest pain. Exercising does not provoke it and eating may alleviate it. She endorses some associated shortness of breath. She has a chronic cough, no changes to this. She denies vomiting or having nausea or leg pain or swelling.     Per Chart Review, the patient was seen on 3/11/2025 at [facility] Emergency Department for evaluation of lightheadedness and a headache. These symptoms had been ongoing for three weeks. CT angio head was negative and reassuring. CBC, BMP, UA, UDS and urine pregnancy test were reassuring. Labs were notable for mild hypokalemia, which resolved after she was given a migraine cocktail. The patient was discharged home.       REVIEW OF SYSTEMS   Review of Systems   Respiratory:  Positive for shortness of breath.    Cardiovascular:  Positive for chest pain. Negative for leg swelling.   Gastrointestinal:  Negative for nausea and vomiting.        PAST MEDICAL HISTORY:  Past Medical History:   Diagnosis Date    ADD (attention deficit disorder)     Anxiety     Borderline mental retardation     Borderline personality disorder (H)     Cerebral palsy (H)     Depression     PONV (postoperative nausea and vomiting)     Uterine fibroid        PAST SURGICAL HISTORY:  Past Surgical History:   Procedure Laterality Date    ADENOIDECTOMY      APPLY EXTERNAL FIXATOR LOWER EXTREMITY Left 5/18/2021    Procedure: CLOSED REDUCTION EXTERNAL FIXATION, LEFT ANKLE FRACTURE;  Surgeon: Dereck Nielson DO;  Location: [facility];  Service: Orthopedics    BUNIONECTOMY      EYE MUSCLE SURGERY      FOOT CAPSULE RELEASE W/ PERCUTANEOUS HEEL CORD LENGTHENING, TIBIAL TENDON TRANSFER Right     LAPAROSCOPIC HYSTERECTOMY N/A 1/4/2019    Procedure: ROBOTIC TOTAL LAPAROSCOPIC HYSTERECTOMY, BILATERAL SALPINGECTOMY LYSIS OF ADHESIONS;  Surgeon: Jose Golden MD;  Location: [facility]  Main OR;  Service: Gynecology    OPEN REDUCTION INTERNAL FIXATION ANKLE Left 5/28/2021    Procedure: WITH OPEN REDUCTION INTERNAL FIXATION, TRIMALLEOLAR, LEFT ANKLE;  Surgeon: Dereck Costa DO;  Location: [facility] Main OR;  Service: Orthopedics    REMOVE HARDWARE LOWER EXTREMITY Left 5/28/2021    Procedure: LEFT ANKLE EXTERNAL FIXATION REMOVAL;  Surgeon: Dereck Costa DO;  Location: [facility] Main OR;  Service: Orthopedics    TYMPANOPLASTY             CURRENT MEDICATIONS:    divalproex (DEPAKOTE ER) 500 MG 24 hour tablet  LATUDA 60 mg Tab tablet  metFORMIN (GLUCOPHAGE XR) 500 MG 24 hr tablet  OLANZapine (ZYPREXA) 2.5 MG tablet  OLANZapine (ZYPREXA) 7.5 MG tablet  zonisamide (ZONEGRAN) 100 MG capsule        ALLERGIES:  No Known Allergies    FAMILY HISTORY:  No family history on file.    SOCIAL HISTORY:   Social History     Socioeconomic History    Marital status: Single   Tobacco Use    Smoking status: Some Days    Smokeless tobacco: Never    Tobacco comments:     < 1 PPW   Substance and Sexual Activity    Alcohol use: Yes     Comment: Alcoholic Drinks/day: rarely    Drug use: No   Social History Narrative    Patient arrived alone to the ED 08/13/17       Social Drivers of Health     Financial Resource Strain: Medium Risk (4/23/2024)    Received from [email or web address] [facility]    Financial Resource Strain     Difficulty of Paying Living Expenses: 2     Difficulty of Paying Living Expenses: 1   Food Insecurity: Patient Unable To Answer (1/9/2025)    Received from [employer]    Hunger Vital Sign     Worried About Running Out of Food in the Last Year: Patient unable to answer     Ran Out of Food in the Last Year: Patient unable to answer   Transportation Needs: Patient Unable To Answer (1/9/2025)    Received from [employer]    PRAPARE - Transportation     Lack of Transportation (Medical): Patient unable to answer     Lack of Transportation (Non-Medical): Patient unable to answer  "  Social Connections: Socially Isolated (4/23/2024)    Received from [facility]    Social Connections     Do you often feel lonely or isolated from those around you?: 4   Interpersonal Safety: Not At Risk (3/11/2025)    Received from [facility]     Humiliation, Afraid, Rape, and Kick questionnaire     Fear of Current or Ex-Partner: No     Emotionally Abused: No     Physically Abused: No     Sexually Abused: No   Housing Stability: Patient Unable To Answer (1/9/2025)    Received from [facility] Vital Sign     Unable to Pay for Housing in the Last Year: Patient unable to answer     Homeless in the Last Year: Patient unable to answer       VITALS:  /81   Pulse 80   Temp 97.7  F (36.5  C) (Oral)   Resp 15   Ht 1.575 m (5' 2\")   Wt 52.2 kg (115 lb)   SpO2 100%   BMI 21.03 kg/m      PHYSICAL EXAM    General presentation: Alert, Vital signs reviewed. NAD  HENT: ENT inspection is normal. Oropharynx is moist and clear.   Eye: Pupils are equal and reactive to light. EOMI  Neck: The neck is supple, with full ROM, with no evidence of meningismus.  Pulmonary: Currently in no acute respiratory distress. Normal, non labored respirations, the lung sounds are normal with good equal air movement. Clear to auscultation bilaterally.   Circulatory: Regular rate and rhythm. Peripheral pulses are strong and equal. No murmurs, rubs, or gallops. Palpation to the sternum reproduces her pain.   Abdominal: The abdomen is soft. No rigidity, guarding, or rebound. Bowel sounds normal. Mild epigastric tenderness to palpation.   Neurologic: Alert, oriented to person, place, and time. No motor deficit. No sensory deficit. Cranial nerves II through XII are intact.  Musculoskeletal: No extremity tenderness. Full range of motion in all extremities. No extremity edema.   Skin: Skin color is normal. No rash. Warm. Dry to touch.      LAB:  All pertinent labs reviewed and " "interpreted.       RADIOLOGY:  Reviewed all pertinent imaging. Please see official radiology report.  No orders to display       EKG:    Performed at: ***    Impression: ***    Rate: ***  Rhythm: ***  Axis: ***  ME Interval: ***  QRS Interval: ***  QTc Interval: ***  ST Changes: ***  Comparison: ***    I have independently reviewed and interpreted the EKG(s) documented above.    PROCEDURES:   ***      [facility] Documentation:   CMS Diagnoses: {Sepsis/Septic Shock/Stemi/Stroke:950533::\"None\"}             I, Katharina Frazier  , am serving as a scribe to document services personally performed by Abiel Curry DO based on my observation and the provider's statements to me. I, Abiel Curry, attest that Katharina Frazier is acting in a scribe capacity, has observed my performance of the services and has documented them in accordance with my direction.    Abiel Curry DO  Emergency Medicine  [facility] EMERGENCY ROOM  [address] [address] [phone]    " extremity edema.   Skin: Skin color is normal. No rash. Warm. Dry to touch.      LAB:  All pertinent labs reviewed and interpreted.  Results for orders placed or performed during the hospital encounter of 03/27/25   Chest XR,  PA & LAT    Impression    IMPRESSION:     No focal airspace disease. No pleural effusion or pneumothorax.    The cardiomediastinal silhouette is unremarkable.   Result Value Ref Range    INR 1.01 0.85 - 1.15   Partial thromboplastin time   Result Value Ref Range    aPTT 28 22 - 38 Seconds   Comprehensive metabolic panel   Result Value Ref Range    Sodium 140 135 - 145 mmol/L    Potassium 3.4 3.4 - 5.3 mmol/L    Carbon Dioxide (CO2) 20 (L) 22 - 29 mmol/L    Anion Gap 14 7 - 15 mmol/L    Urea Nitrogen 11.2 6.0 - 20.0 mg/dL    Creatinine 0.62 0.51 - 0.95 mg/dL    GFR Estimate >90 >60 mL/min/1.73m2    Calcium 9.1 8.8 - 10.4 mg/dL    Chloride 106 98 - 107 mmol/L    Glucose 97 70 - 99 mg/dL    Alkaline Phosphatase 42 40 - 150 U/L    AST 23 0 - 45 U/L    ALT 19 0 - 50 U/L    Protein Total 6.5 6.4 - 8.3 g/dL    Albumin 4.2 3.5 - 5.2 g/dL    Bilirubin Total 0.3 <=1.2 mg/dL   Result Value Ref Range    Magnesium 2.1 1.7 - 2.3 mg/dL   Result Value Ref Range    Troponin T, High Sensitivity <6 <=14 ng/L   Result Value Ref Range    Lipase 38 13 - 60 U/L   CBC with platelets and differential   Result Value Ref Range    WBC Count 8.5 4.0 - 11.0 10e3/uL    RBC Count 4.39 3.80 - 5.20 10e6/uL    Hemoglobin 13.8 11.7 - 15.7 g/dL    Hematocrit 40.0 35.0 - 47.0 %    MCV 91 78 - 100 fL    MCH 31.4 26.5 - 33.0 pg    MCHC 34.5 31.5 - 36.5 g/dL    RDW 12.1 10.0 - 15.0 %    Platelet Count 263 150 - 450 10e3/uL    % Neutrophils 56 %    % Lymphocytes 34 %    % Monocytes 8 %    % Eosinophils 1 %    % Basophils 0 %    % Immature Granulocytes 0 %    NRBCs per 100 WBC 0 <1 /100    Absolute Neutrophils 4.8 1.6 - 8.3 10e3/uL    Absolute Lymphocytes 2.9 0.8 - 5.3 10e3/uL    Absolute Monocytes 0.7 0.0 - 1.3 10e3/uL    Absolute  Eosinophils 0.0 0.0 - 0.7 10e3/uL    Absolute Basophils 0.0 0.0 - 0.2 10e3/uL    Absolute Immature Granulocytes 0.0 <=0.4 10e3/uL    Absolute NRBCs 0.0 10e3/uL   ECG 12-LEAD WITH MUSE (LHE)   Result Value Ref Range    Systolic Blood Pressure 126 mmHg    Diastolic Blood Pressure 69 mmHg    Ventricular Rate 70 BPM    Atrial Rate 70 BPM    KS Interval 130 ms    QRS Duration 76 ms     ms    QTc 436 ms    P Axis 55 degrees    R AXIS 84 degrees    T Axis 40 degrees    Interpretation ECG       Sinus rhythm  Normal ECG  When compared with ECG of 15-May-2024 17:21,  No significant change was found  Confirmed by SEE ED PROVIDER NOTE FOR, ECG INTERPRETATION ([id number]),  KAEL SANTILLAN ([id number]) on 3/27/2025 11:59:22 PM         RADIOLOGY:  Reviewed all pertinent imaging. Please see official radiology report.  Chest XR,  PA & LAT   Final Result   IMPRESSION:       No focal airspace disease. No pleural effusion or pneumothorax.      The cardiomediastinal silhouette is unremarkable.          EKG:    Normal sinus rhythm.  Rate of 70.  Normal QRS.  Normal QT.  No ST or T wave changes.  Compared to EKG on 5/15/2024 no significant changes are noted.    I have independently reviewed and interpreted the EKG(s) documented above.      I, Katharina Frazier  , am serving as a scribe to document services personally performed by Abiel Curry DO based on my observation and the provider's statements to me. I, Abiel Curry, attest that Katharina Frazier is acting in a scribe capacity, has observed my performance of the services and has documented them in accordance with my direction.    Abiel Curry DO  Emergency Medicine  [facility] EMERGENCY ROOM  [address] [id number] [phone]       Abiel Curry DO  03/28/25 [id number]

## 2025-03-28 NOTE — ED TRIAGE NOTES
Pt coming in via EMS for chest pain evaluation. Pt says it has been going on for the past 2 weeks but tonight it has been annoying her. Endorsing shortness of breath as well. Vitally stable.      Triage Assessment (Adult)       Row Name 03/27/25 7618          Triage Assessment    Airway WDL WDL        Respiratory WDL    Respiratory WDL X;rhythm/pattern     Rhythm/Pattern, Respiratory shortness of breath        Skin Circulation/Temperature WDL    Skin Circulation/Temperature WDL WDL        Cardiac WDL    Cardiac WDL X;chest pain        Chest Pain Assessment    Chest Pain Location substernal     Chest Pain Intervention 12-lead ECG obtained        Peripheral/Neurovascular WDL    Peripheral Neurovascular WDL WDL        Cognitive/Neuro/Behavioral WDL    Cognitive/Neuro/Behavioral WDL WDL

## 2025-05-28 ENCOUNTER — HOSPITAL ENCOUNTER (EMERGENCY)
Facility: CLINIC | Age: 35
Discharge: HOME | End: 2025-05-28
Attending: EMERGENCY MEDICINE | Admitting: EMERGENCY MEDICINE
Payer: MEDICARE

## 2025-05-28 VITALS
DIASTOLIC BLOOD PRESSURE: 71 MMHG | OXYGEN SATURATION: 99 % | SYSTOLIC BLOOD PRESSURE: 103 MMHG | HEART RATE: 65 BPM | HEIGHT: 61 IN | RESPIRATION RATE: 18 BRPM | BODY MASS INDEX: 23.03 KG/M2 | TEMPERATURE: 98.4 F | WEIGHT: 122 LBS

## 2025-05-28 DIAGNOSIS — R07.9 CHEST PAIN, UNSPECIFIED TYPE: ICD-10-CM

## 2025-05-28 LAB
ANION GAP SERPL CALCULATED.3IONS-SCNC: 12 MMOL/L (ref 7–15)
ATRIAL RATE - MUSE: 80 BPM
BASOPHILS # BLD AUTO: 0 10E3/UL (ref 0–0.2)
BASOPHILS NFR BLD AUTO: 0 %
BUN SERPL-MCNC: 14.8 MG/DL (ref 6–20)
CALCIUM SERPL-MCNC: 9.6 MG/DL (ref 8.8–10.4)
CHLORIDE SERPL-SCNC: 106 MMOL/L (ref 98–107)
CREAT SERPL-MCNC: 0.56 MG/DL (ref 0.51–0.95)
D DIMER PPP FEU-MCNC: <0.27 UG/ML FEU (ref 0–0.5)
DIASTOLIC BLOOD PRESSURE - MUSE: NORMAL MMHG
EGFRCR SERPLBLD CKD-EPI 2021: >90 ML/MIN/1.73M2
EOSINOPHIL # BLD AUTO: 0 10E3/UL (ref 0–0.7)
EOSINOPHIL NFR BLD AUTO: 0 %
ERYTHROCYTE [DISTWIDTH] IN BLOOD BY AUTOMATED COUNT: 11.9 % (ref 10–15)
GLUCOSE SERPL-MCNC: 152 MG/DL (ref 70–99)
HCO3 SERPL-SCNC: 21 MMOL/L (ref 22–29)
HCT VFR BLD AUTO: 41.2 % (ref 35–47)
HGB BLD-MCNC: 13.9 G/DL (ref 11.7–15.7)
HOLD SPECIMEN: NORMAL
IMM GRANULOCYTES # BLD: 0 10E3/UL
IMM GRANULOCYTES NFR BLD: 0 %
INTERPRETATION ECG - MUSE: NORMAL
LYMPHOCYTES # BLD AUTO: 2.1 10E3/UL (ref 0.8–5.3)
LYMPHOCYTES NFR BLD AUTO: 27 %
MCH RBC QN AUTO: 30.8 PG (ref 26.5–33)
MCHC RBC AUTO-ENTMCNC: 33.7 G/DL (ref 31.5–36.5)
MCV RBC AUTO: 91 FL (ref 78–100)
MONOCYTES # BLD AUTO: 0.4 10E3/UL (ref 0–1.3)
MONOCYTES NFR BLD AUTO: 4 %
NEUTROPHILS # BLD AUTO: 5.4 10E3/UL (ref 1.6–8.3)
NEUTROPHILS NFR BLD AUTO: 68 %
NRBC # BLD AUTO: 0 10E3/UL
NRBC BLD AUTO-RTO: 0 /100
NT-PROBNP SERPL-MCNC: 76 PG/ML (ref 0–178)
P AXIS - MUSE: 68 DEGREES
PLATELET # BLD AUTO: 276 10E3/UL (ref 150–450)
POTASSIUM SERPL-SCNC: 3.2 MMOL/L (ref 3.4–5.3)
PR INTERVAL - MUSE: 144 MS
QRS DURATION - MUSE: 78 MS
QT - MUSE: 392 MS
QTC - MUSE: 452 MS
R AXIS - MUSE: 74 DEGREES
RBC # BLD AUTO: 4.51 10E6/UL (ref 3.8–5.2)
SODIUM SERPL-SCNC: 139 MMOL/L (ref 135–145)
SYSTOLIC BLOOD PRESSURE - MUSE: NORMAL MMHG
T AXIS - MUSE: 33 DEGREES
TROPONIN T SERPL HS-MCNC: <6 NG/L
VENTRICULAR RATE- MUSE: 80 BPM
WBC # BLD AUTO: 7.9 10E3/UL (ref 4–11)

## 2025-05-28 PROCEDURE — 250N000011 HC RX IP 250 OP 636: Mod: JZ | Performed by: EMERGENCY MEDICINE

## 2025-05-28 PROCEDURE — 99285 EMERGENCY DEPT VISIT HI MDM: CPT | Mod: 25

## 2025-05-28 PROCEDURE — 80048 BASIC METABOLIC PNL TOTAL CA: CPT | Performed by: EMERGENCY MEDICINE

## 2025-05-28 PROCEDURE — 85379 FIBRIN DEGRADATION QUANT: CPT | Performed by: EMERGENCY MEDICINE

## 2025-05-28 PROCEDURE — 250N000013 HC RX MED GY IP 250 OP 250 PS 637: Performed by: EMERGENCY MEDICINE

## 2025-05-28 PROCEDURE — 36415 COLL VENOUS BLD VENIPUNCTURE: CPT | Performed by: PHYSICIAN ASSISTANT

## 2025-05-28 PROCEDURE — 85041 AUTOMATED RBC COUNT: CPT | Performed by: EMERGENCY MEDICINE

## 2025-05-28 PROCEDURE — 36415 COLL VENOUS BLD VENIPUNCTURE: CPT | Performed by: EMERGENCY MEDICINE

## 2025-05-28 PROCEDURE — 84484 ASSAY OF TROPONIN QUANT: CPT | Performed by: EMERGENCY MEDICINE

## 2025-05-28 PROCEDURE — 83880 ASSAY OF NATRIURETIC PEPTIDE: CPT | Performed by: EMERGENCY MEDICINE

## 2025-05-28 PROCEDURE — 93005 ELECTROCARDIOGRAM TRACING: CPT | Performed by: PHYSICIAN ASSISTANT

## 2025-05-28 PROCEDURE — 96375 TX/PRO/DX INJ NEW DRUG ADDON: CPT

## 2025-05-28 PROCEDURE — 96374 THER/PROPH/DIAG INJ IV PUSH: CPT

## 2025-05-28 RX ORDER — KETOROLAC TROMETHAMINE 15 MG/ML
15 INJECTION, SOLUTION INTRAMUSCULAR; INTRAVENOUS ONCE
Status: COMPLETED | OUTPATIENT
Start: 2025-05-28 | End: 2025-05-28

## 2025-05-28 RX ORDER — MAGNESIUM HYDROXIDE/ALUMINUM HYDROXICE/SIMETHICONE 120; 1200; 1200 MG/30ML; MG/30ML; MG/30ML
15 SUSPENSION ORAL ONCE
Status: COMPLETED | OUTPATIENT
Start: 2025-05-28 | End: 2025-05-28

## 2025-05-28 RX ORDER — LORAZEPAM 2 MG/ML
0.5 INJECTION INTRAMUSCULAR ONCE
Status: COMPLETED | OUTPATIENT
Start: 2025-05-28 | End: 2025-05-28

## 2025-05-28 RX ORDER — FAMOTIDINE 20 MG/1
20 TABLET, FILM COATED ORAL 2 TIMES DAILY
Qty: 20 TABLET | Refills: 0 | Status: SHIPPED | OUTPATIENT
Start: 2025-05-28

## 2025-05-28 RX ADMIN — LORAZEPAM 0.5 MG: 2 INJECTION INTRAMUSCULAR; INTRAVENOUS at 17:56

## 2025-05-28 RX ADMIN — KETOROLAC TROMETHAMINE 15 MG: 15 INJECTION, SOLUTION INTRAMUSCULAR; INTRAVENOUS at 17:54

## 2025-05-28 RX ADMIN — ALUMINUM HYDROXIDE, MAGNESIUM HYDROXIDE, AND DIMETHICONE 15 ML: 200; 20; 200 SUSPENSION ORAL at 17:54

## 2025-05-28 ASSESSMENT — COLUMBIA-SUICIDE SEVERITY RATING SCALE - C-SSRS
1. IN THE PAST MONTH, HAVE YOU WISHED YOU WERE DEAD OR WISHED YOU COULD GO TO SLEEP AND NOT WAKE UP?: NO
6. HAVE YOU EVER DONE ANYTHING, STARTED TO DO ANYTHING, OR PREPARED TO DO ANYTHING TO END YOUR LIFE?: NO
2. HAVE YOU ACTUALLY HAD ANY THOUGHTS OF KILLING YOURSELF IN THE PAST MONTH?: NO

## 2025-05-28 ASSESSMENT — ACTIVITIES OF DAILY LIVING (ADL)
ADLS_ACUITY_SCORE: 41

## 2025-05-28 NOTE — ED TRIAGE NOTES
Pt presents to the ED with c/o CP and SOB that has been going on for a few weeks. Denies any hx cardiac problems. Pt endorses slight nausea, no vomiting.      Triage Assessment (Adult)       Row Name 05/28/25 9423          Triage Assessment    Airway WDL WDL        Respiratory WDL    Respiratory WDL X;rhythm/pattern     Rhythm/Pattern, Respiratory shortness of breath        Skin Circulation/Temperature WDL    Skin Circulation/Temperature WDL WDL        Cardiac WDL    Cardiac WDL X;chest pain        Chest Pain Assessment    Chest Pain Location substernal     Precipitating Factors nothing     Alleviating Factors nothing     Associated Signs/Symptoms anxiety        Peripheral/Neurovascular WDL    Peripheral Neurovascular WDL WDL        Cognitive/Neuro/Behavioral WDL    Cognitive/Neuro/Behavioral WDL WDL

## 2025-05-29 NOTE — ED NOTES
AVS and home instructions discussed with patient. Patient verbalizes understanding of instructions.

## 2025-05-29 NOTE — DISCHARGE INSTRUCTIONS
Recommend Pepcid twice a day.  Follow-up primary care doctor or whoever is managing your anxiety medicines for recheck.  Return to the ER for worsening symptoms

## 2025-05-29 NOTE — ED PROVIDER NOTES
EMERGENCY DEPARTMENT ENCOUNTER      NAME: Shannon Christopher  AGE: 35 year old female  YOB: 1990  MRN: [id number]  EVALUATION DATE & TIME: 5/28/2025  4:50 PM    PCP: Sveta Sandoval    ED PROVIDER: Gaston Ferguson MD      Chief Complaint   Patient presents with    Chest Pain    Shortness of Breath         FINAL IMPRESSION:  1. Chest pain, unspecified type          ED COURSE & MEDICAL DECISION MAKING:    Pertinent Labs & Imaging studies reviewed. (See chart for details)  35 year old female presents to the Emergency Department for evaluation of sternal chest pain.  Patient also reports her anxiety is recently increased    Differential includes ACS, pulmonary emboli, pneumonia, aortic dissection, esophageal rupture, pneumothorax, pneumonia, malignancy, pleurisy, shingles, and GERD.  Based on history and examination pulmonary emboli and aortic dissection unlikely.     Plan for Ativan, GI cocktail, Toradol, D-dimer, proBNP, EKG, troponin, BNP, chest    Treatment symptoms have improved.  Doubt dissection.  X-ray without pneumothorax or pneumonia per my independent read.      Possible esophagitis versus pleurisy.  Plan for discharge home Pepcid.  Recommend follow-up primary      ED Course as of 05/28/25 2247   Wed May 28, 2025   1741 I met with the patient, obtained history, performed an initial exam, and discussed options and plan for diagnostics and treatment here in the ED.   2025 I updated the patient on her lab and imaging results. The patient reports that she is feeling better. Discussed further plan of care.   2109 D-Dimer Quantitative: <0.27  Wells low risk, CTA PE not indicated   2109 N-Terminal Pro Bnp: 76   2109 Troponin T, High Sensitivity: <6       Medical Decision Making  Care impacted by Mental Health  I considered additional work up, including CTA, but deferred normal D-dimer Wells low risk  I independently interpreted the chest x-ray and note absence of pneumonia. See radiology report for final  interpretation.  Discharge. I prescribed additional prescription strength medication(s) as charted. N/A.    MIPS (CTPE, Dental pain, Patrick, Sinusitis, Asthma/COPD, Head Trauma): Not Applicable    SEPSIS: None              At the conclusion of the encounter I discussed the results of all of the tests and the disposition. The questions were answered. The patient or family acknowledged understanding and was agreeable with the care plan.         Voice recognition software used for this note,  any grammatical or spelling errors are non-intentional. Please contact the author of this note directly if you are in need of any clarification.      MEDICATIONS GIVEN IN THE EMERGENCY:  Medications   ketorolac (TORADOL) injection 15 mg (15 mg Intravenous $Given 5/28/25 1754)   alum & mag hydroxide-simethicone (MAALOX) suspension 15 mL (15 mLs Oral $Given 5/28/25 1754)   LORazepam (ATIVAN) injection 0.5 mg (0.5 mg Intravenous $Given 5/28/25 1756)       NEW PRESCRIPTIONS STARTED AT TODAY'S ER VISIT  Discharge Medication List as of 5/28/2025  8:36 PM        START taking these medications    Details   famotidine (PEPCID) 20 MG tablet Take 1 tablet (20 mg) by mouth 2 times daily., Disp-20 tablet, R-0, Local Print                =================================================================    TRIAGE ASSESSMENT:  Pt presents to the ED with c/o CP and SOB that has been going on for a few weeks. Denies any hx cardiac problems. Pt endorses slight nausea, no vomiting.      Triage Assessment (Adult)       Row Name 05/28/25 6719          Triage Assessment    Airway WDL WDL        Respiratory WDL    Respiratory WDL X;rhythm/pattern     Rhythm/Pattern, Respiratory shortness of breath        Skin Circulation/Temperature WDL    Skin Circulation/Temperature WDL WDL        Cardiac WDL    Cardiac WDL X;chest pain        Chest Pain Assessment    Chest Pain Location substernal     Precipitating Factors nothing     Alleviating Factors nothing      "Associated Signs/Symptoms anxiety        Peripheral/Neurovascular WDL    Peripheral Neurovascular WDL WDL        Cognitive/Neuro/Behavioral WDL    Cognitive/Neuro/Behavioral WDL WDL                          HPI    Patient information was obtained from: The patient    Use of : N/A        Shannon Christopher is a 35 year old female with a pertinent history of MDD, cerebral palsy, borderline personality disorder, MOOK, bipolar 1 disorder  who presents to this ED via walk-in for evaluation of chest pain and shortness of breath.    The patient reports that she is experiencing \"really bad\" chest pain localized at along her midsternal chest. She has also had a really bad cough with this. She admits that she is a smoker, but reports that she is trying to \"cut down\" on her smoking. When asked how much she smokes, she responded by saying \"a lot\". She also notes that she has been having episodes of epistaxis as well. She's been hydrating herself with water now. Denies having any sinus pain. Reports that she is adopted, so she does not know much about her medical history or family medical history. States that she was never told she has asthma. Denies having any problems with swallowing. No known fevers. States that she has been under a lot of stress, and when she was asked why she has been under a lot of stress, she responded by saying \"there is a multiple of reasons\". She has not been taking Tylenol, Tums or ibuprofen at home. States that her medications has not been working for her symptoms, and she explains that she is \"mad\" because of this. No additional complaints or concerns reported at this time.      REVIEW OF SYSTEMS   Review of Systems - Refer to HPI, otherwise negative    PAST MEDICAL HISTORY:  Past Medical History:   Diagnosis Date    ADD (attention deficit disorder)     Anxiety     Borderline mental retardation     Borderline personality disorder (H)     Cerebral palsy (H)     Depression     PONV " (postoperative nausea and vomiting)     Uterine fibroid        PAST SURGICAL HISTORY:  Past Surgical History:   Procedure Laterality Date    ADENOIDECTOMY      APPLY EXTERNAL FIXATOR LOWER EXTREMITY Left 5/18/2021    Procedure: CLOSED REDUCTION EXTERNAL FIXATION, LEFT ANKLE FRACTURE;  Surgeon: Dereck Nielson DO;  Location: [facility];  Service: Orthopedics    BUNIONECTOMY      EYE MUSCLE SURGERY      FOOT CAPSULE RELEASE W/ PERCUTANEOUS HEEL CORD LENGTHENING, TIBIAL TENDON TRANSFER Right     LAPAROSCOPIC HYSTERECTOMY N/A 1/4/2019    Procedure: ROBOTIC TOTAL LAPAROSCOPIC HYSTERECTOMY, BILATERAL SALPINGECTOMY LYSIS OF ADHESIONS;  Surgeon: Jose Golden MD;  Location: [facility] OR;  Service: Gynecology    OPEN REDUCTION INTERNAL FIXATION ANKLE Left 5/28/2021    Procedure: WITH OPEN REDUCTION INTERNAL FIXATION, TRIMALLEOLAR, LEFT ANKLE;  Surgeon: Dereck Costa DO;  Location: [facility] OR;  Service: Orthopedics    REMOVE HARDWARE LOWER EXTREMITY Left 5/28/2021    Procedure: LEFT ANKLE EXTERNAL FIXATION REMOVAL;  Surgeon: Dereck Costa DO;  Location: [facility] OR;  Service: Orthopedics    TYMPANOPLASTY             CURRENT MEDICATIONS:    divalproex (DEPAKOTE ER) 500 MG 24 hour tablet  famotidine (PEPCID) 20 MG tablet  LATUDA 60 mg Tab tablet  metFORMIN (GLUCOPHAGE XR) 500 MG 24 hr tablet  OLANZapine (ZYPREXA) 2.5 MG tablet  OLANZapine (ZYPREXA) 7.5 MG tablet  zonisamide (ZONEGRAN) 100 MG capsule        ALLERGIES:  No Known Allergies    FAMILY HISTORY:  No family history on file.    SOCIAL HISTORY:   Social History     Socioeconomic History    Marital status: Single   Tobacco Use    Smoking status: Some Days    Smokeless tobacco: Never    Tobacco comments:     < 1 PPW   Substance and Sexual Activity    Alcohol use: Yes     Comment: Alcoholic Drinks/day: rarely    Drug use: No   Social History Narrative    Patient arrived alone to the ED 08/13/17       Social Drivers of  "Health     Financial Resource Strain: Medium Risk (4/23/2024)    Received from [facility]    Financial Resource Strain     Difficulty of Paying Living Expenses: 2     Difficulty of Paying Living Expenses: 1   Food Insecurity: Patient Unable To Answer (1/9/2025)    Received from [facility]    Hunger Vital Sign     Worried About Running Out of Food in the Last Year: Patient unable to answer     Ran Out of Food in the Last Year: Patient unable to answer   Transportation Needs: Patient Unable To Answer (1/9/2025)    Received from [facility]    PRAPARE - Transportation     Lack of Transportation (Medical): Patient unable to answer     Lack of Transportation (Non-Medical): Patient unable to answer   Social Connections: Socially Isolated (4/23/2024)    Received from [facility]    Social Connections     Do you often feel lonely or isolated from those around you?: 4   Interpersonal Safety: Not At Risk (5/7/2025)    Received from [facility]     Humiliation, Afraid, Rape, and Kick questionnaire     Fear of Current or Ex-Partner: No     Emotionally Abused: No     Physically Abused: No     Sexually Abused: No   Housing Stability: Patient Unable To Answer (1/9/2025)    Received from [facility]    Housing Stability Vital Sign     Unable to Pay for Housing in the Last Year: Patient unable to answer     Homeless in the Last Year: Patient unable to answer       VITALS:  /71   Pulse 65   Temp 98.4  F (36.9  C) (Temporal)   Resp 18   Ht 1.549 m (5' 1\")   Wt 55.3 kg (122 lb)   SpO2 99%   BMI 23.05 kg/m      PHYSICAL EXAM      Vitals: /71   Pulse 65   Temp 98.4  F (36.9  C) (Temporal)   Resp 18   Ht 1.549 m (5' 1\")   Wt 55.3 kg (122 lb)   SpO2 99%   BMI 23.05 kg/m    General: Appears in no acute distress, awake, alert, interactive.  Eyes: Conjunctivae non-injected. Sclera anicteric.  HENT: Atraumatic.  Neck: " Supple.  Cardiovascular: No murmurs.  Respiratory/Chest: Respiration unlabored. No wheezing. No rhonchi.  Abdomen: non distended. No abdominal tenderness.  Musculoskeletal: Normal extremities. No edema or erythema.  Skin: Normal color. No rash or diaphoresis.  Neurologic: Face symmetric, moves all extremities spontaneously. Speech clear.  Psychiatric: Oriented to person, place, and time. Affect appropriate.    LAB:  All pertinent labs reviewed and interpreted.  Results for orders placed or performed during the hospital encounter of 05/28/25   Chest XR,  PA & LAT    Impression    IMPRESSION: Lungs are clear. No hydropneumothorax or fracture. Heart and pulmonary vascularity are normal.     Result Value Ref Range    Troponin T, High Sensitivity <6 <=14 ng/L   Basic metabolic panel   Result Value Ref Range    Sodium 139 135 - 145 mmol/L    Potassium 3.2 (L) 3.4 - 5.3 mmol/L    Chloride 106 98 - 107 mmol/L    Carbon Dioxide (CO2) 21 (L) 22 - 29 mmol/L    Anion Gap 12 7 - 15 mmol/L    Urea Nitrogen 14.8 6.0 - 20.0 mg/dL    Creatinine 0.56 0.51 - 0.95 mg/dL    GFR Estimate >90 >60 mL/min/1.73m2    Calcium 9.6 8.8 - 10.4 mg/dL    Glucose 152 (H) 70 - 99 mg/dL   D dimer quantitative   Result Value Ref Range    D-Dimer Quantitative <0.27 0.00 - 0.50 ug/mL FEU   NT-proBNP   Result Value Ref Range    NT-proBNP 76 0 - 178 pg/mL   Extra Blue Top Tube   Result Value Ref Range    Hold Specimen JIC    Extra Red Top Tube   Result Value Ref Range    Hold Specimen JIC    Extra Green Top (Lithium Heparin) Tube   Result Value Ref Range    Hold Specimen JIC    Extra Purple Top Tube   Result Value Ref Range    Hold Specimen JIC    CBC with platelets and differential   Result Value Ref Range    WBC Count 7.9 4.0 - 11.0 10e3/uL    RBC Count 4.51 3.80 - 5.20 10e6/uL    Hemoglobin 13.9 11.7 - 15.7 g/dL    Hematocrit 41.2 35.0 - 47.0 %    MCV 91 78 - 100 fL    MCH 30.8 26.5 - 33.0 pg    MCHC 33.7 31.5 - 36.5 g/dL    RDW 11.9 10.0 - 15.0 %     Platelet Count 276 150 - 450 10e3/uL    % Neutrophils 68 %    % Lymphocytes 27 %    % Monocytes 4 %    % Eosinophils 0 %    % Basophils 0 %    % Immature Granulocytes 0 %    NRBCs per 100 WBC 0 <1 /100    Absolute Neutrophils 5.4 1.6 - 8.3 10e3/uL    Absolute Lymphocytes 2.1 0.8 - 5.3 10e3/uL    Absolute Monocytes 0.4 0.0 - 1.3 10e3/uL    Absolute Eosinophils 0.0 0.0 - 0.7 10e3/uL    Absolute Basophils 0.0 0.0 - 0.2 10e3/uL    Absolute Immature Granulocytes 0.0 <=0.4 10e3/uL    Absolute NRBCs 0.0 10e3/uL   ECG 12-LEAD WITH MUSE (LHE)   Result Value Ref Range    Systolic Blood Pressure  mmHg    Diastolic Blood Pressure  mmHg    Ventricular Rate 80 BPM    Atrial Rate 80 BPM    CO Interval 144 ms    QRS Duration 78 ms     ms    QTc 452 ms    P Axis 68 degrees    R AXIS 74 degrees    T Axis 33 degrees    Interpretation ECG       ** Poor data quality, interpretation may be adversely affected  Sinus rhythm  Possible Left atrial enlargement  Nonspecific T wave abnormality  Abnormal ECG  When compared with ECG of 27-Mar-2025 23:04,  No significant change was found  Confirmed by SEE ED PROVIDER NOTE FOR, ECG INTERPRETATION ([id number]),  Mena Evans ([id number]) on 5/28/2025 4:40:26 PM         RADIOLOGY:  Reviewed all pertinent imaging. Please see official radiology report.  Chest XR,  PA & LAT   Final Result   IMPRESSION: Lungs are clear. No hydropneumothorax or fracture. Heart and pulmonary vascularity are normal.            EKG:    Performed at: 16:35:20 on 05/28/2025    Impression: Sinus rhythm. Possible Left atrial enlargement. Nonspecific T wave abnormality.    Rate: 80 BPM  Rhythm: Sinus  Axis: 74  CO Interval: 144 ms  QRS Interval: 78 ms  QTc Interval: 452 ms  ST Changes: None.  Comparison: When compared with ECG of 27-Mar-2025 23:04, No significant change was found.    I have independently reviewed and interpreted the EKG(s) documented above.    PROCEDURES:   N/A        FATEMEH, Ananya Palma, am serving as a scribe  to document services personally performed by Gaston Ferguson MD based on my observation and the provider's statements to me. I, Gaston Ferguson MD, attest that Ananya Palam is acting in a scribe capacity, has observed my performance of the services and has documented them in accordance with my direction.    Gaston Ferguson MD  [facility] EMERGENCY ROOM  [address] [facility] [id number] [phone]      Gaston Ferguson MD  05/28/25 6791

## 2025-08-01 ENCOUNTER — APPOINTMENT (OUTPATIENT)
Dept: RADIOLOGY | Facility: CLINIC | Age: 35
End: 2025-08-01
Attending: EMERGENCY MEDICINE
Payer: MEDICARE

## 2025-08-01 ENCOUNTER — HOSPITAL ENCOUNTER (EMERGENCY)
Facility: CLINIC | Age: 35
Discharge: HOME | End: 2025-08-02
Attending: EMERGENCY MEDICINE | Admitting: EMERGENCY MEDICINE
Payer: MEDICARE

## 2025-08-01 VITALS
HEIGHT: 61 IN | RESPIRATION RATE: 24 BRPM | TEMPERATURE: 97.4 F | BODY MASS INDEX: 23.03 KG/M2 | OXYGEN SATURATION: 100 % | HEART RATE: 79 BPM | SYSTOLIC BLOOD PRESSURE: 119 MMHG | WEIGHT: 122 LBS | DIASTOLIC BLOOD PRESSURE: 77 MMHG

## 2025-08-01 DIAGNOSIS — R07.89 ATYPICAL CHEST PAIN: Primary | ICD-10-CM

## 2025-08-01 LAB
ATRIAL RATE - MUSE: 79 BPM
DIASTOLIC BLOOD PRESSURE - MUSE: NORMAL MMHG
INTERPRETATION ECG - MUSE: NORMAL
P AXIS - MUSE: 63 DEGREES
PR INTERVAL - MUSE: 144 MS
QRS DURATION - MUSE: 72 MS
QT - MUSE: 396 MS
QTC - MUSE: 454 MS
R AXIS - MUSE: 72 DEGREES
SYSTOLIC BLOOD PRESSURE - MUSE: NORMAL MMHG
T AXIS - MUSE: 13 DEGREES
VENTRICULAR RATE- MUSE: 79 BPM

## 2025-08-01 PROCEDURE — 250N000013 HC RX MED GY IP 250 OP 250 PS 637: Performed by: EMERGENCY MEDICINE

## 2025-08-01 PROCEDURE — 250N000009 HC RX 250: Performed by: EMERGENCY MEDICINE

## 2025-08-01 PROCEDURE — 71046 X-RAY EXAM CHEST 2 VIEWS: CPT

## 2025-08-01 PROCEDURE — 94640 AIRWAY INHALATION TREATMENT: CPT

## 2025-08-01 PROCEDURE — 93005 ELECTROCARDIOGRAM TRACING: CPT | Performed by: EMERGENCY MEDICINE

## 2025-08-01 PROCEDURE — 999N000157 HC STATISTIC RCP TIME EA 10 MIN

## 2025-08-01 PROCEDURE — 99284 EMERGENCY DEPT VISIT MOD MDM: CPT | Mod: 25 | Performed by: EMERGENCY MEDICINE

## 2025-08-01 RX ORDER — HYDROXYZINE HYDROCHLORIDE 25 MG/1
50 TABLET, FILM COATED ORAL ONCE
Status: COMPLETED | OUTPATIENT
Start: 2025-08-01 | End: 2025-08-01

## 2025-08-01 RX ORDER — ACETAMINOPHEN 325 MG/1
650 TABLET ORAL ONCE
Status: COMPLETED | OUTPATIENT
Start: 2025-08-01 | End: 2025-08-01

## 2025-08-01 RX ORDER — IPRATROPIUM BROMIDE AND ALBUTEROL SULFATE 2.5; .5 MG/3ML; MG/3ML
3 SOLUTION RESPIRATORY (INHALATION) ONCE
Status: COMPLETED | OUTPATIENT
Start: 2025-08-01 | End: 2025-08-01

## 2025-08-01 RX ORDER — MAGNESIUM HYDROXIDE/ALUMINUM HYDROXICE/SIMETHICONE 120; 1200; 1200 MG/30ML; MG/30ML; MG/30ML
15 SUSPENSION ORAL ONCE
Status: COMPLETED | OUTPATIENT
Start: 2025-08-01 | End: 2025-08-01

## 2025-08-01 RX ADMIN — HYDROXYZINE HYDROCHLORIDE 50 MG: 25 TABLET, FILM COATED ORAL at 23:50

## 2025-08-01 RX ADMIN — ALUMINUM HYDROXIDE, MAGNESIUM HYDROXIDE, AND SIMETHICONE 15 ML: 200; 200; 20 SUSPENSION ORAL at 22:38

## 2025-08-01 RX ADMIN — IPRATROPIUM BROMIDE AND ALBUTEROL SULFATE 3 ML: .5; 3 SOLUTION RESPIRATORY (INHALATION) at 23:39

## 2025-08-01 RX ADMIN — ACETAMINOPHEN 650 MG: 325 TABLET ORAL at 23:49

## 2025-08-01 ASSESSMENT — ACTIVITIES OF DAILY LIVING (ADL)
ADLS_ACUITY_SCORE: 41
ADLS_ACUITY_SCORE: 41

## 2025-08-16 ENCOUNTER — APPOINTMENT (OUTPATIENT)
Dept: RADIOLOGY | Facility: CLINIC | Age: 35
End: 2025-08-16
Attending: PHYSICIAN ASSISTANT
Payer: MEDICARE

## 2025-08-16 ENCOUNTER — HOSPITAL ENCOUNTER (EMERGENCY)
Facility: CLINIC | Age: 35
Discharge: HOME | End: 2025-08-16
Admitting: PHYSICIAN ASSISTANT
Payer: MEDICARE

## 2025-08-16 VITALS
TEMPERATURE: 97.4 F | WEIGHT: 125 LBS | HEART RATE: 86 BPM | RESPIRATION RATE: 16 BRPM | OXYGEN SATURATION: 100 % | BODY MASS INDEX: 23.62 KG/M2 | SYSTOLIC BLOOD PRESSURE: 135 MMHG | DIASTOLIC BLOOD PRESSURE: 89 MMHG

## 2025-08-16 DIAGNOSIS — K59.00 CONSTIPATION: Primary | ICD-10-CM

## 2025-08-16 PROCEDURE — 74019 RADEX ABDOMEN 2 VIEWS: CPT

## 2025-08-16 PROCEDURE — 99283 EMERGENCY DEPT VISIT LOW MDM: CPT

## 2025-08-16 RX ORDER — MAGNESIUM CARB/ALUMINUM HYDROX 105-160MG
296 TABLET,CHEWABLE ORAL ONCE
Status: DISCONTINUED | OUTPATIENT
Start: 2025-08-16 | End: 2025-08-16

## 2025-08-16 ASSESSMENT — ACTIVITIES OF DAILY LIVING (ADL)
ADLS_ACUITY_SCORE: 41

## 2025-08-16 ASSESSMENT — COLUMBIA-SUICIDE SEVERITY RATING SCALE - C-SSRS
2. HAVE YOU ACTUALLY HAD ANY THOUGHTS OF KILLING YOURSELF IN THE PAST MONTH?: NO
6. HAVE YOU EVER DONE ANYTHING, STARTED TO DO ANYTHING, OR PREPARED TO DO ANYTHING TO END YOUR LIFE?: YES
1. IN THE PAST MONTH, HAVE YOU WISHED YOU WERE DEAD OR WISHED YOU COULD GO TO SLEEP AND NOT WAKE UP?: NO